# Patient Record
Sex: MALE | Race: WHITE | Employment: FULL TIME | URBAN - METROPOLITAN AREA
[De-identification: names, ages, dates, MRNs, and addresses within clinical notes are randomized per-mention and may not be internally consistent; named-entity substitution may affect disease eponyms.]

---

## 2017-01-02 DIAGNOSIS — E78.00 PURE HYPERCHOLESTEROLEMIA: Chronic | ICD-10-CM

## 2017-01-02 RX ORDER — ATORVASTATIN CALCIUM 80 MG/1
TABLET, FILM COATED ORAL
Qty: 30 TABLET | Refills: 5 | Status: SHIPPED | OUTPATIENT
Start: 2017-01-02 | End: 2017-07-14 | Stop reason: SDUPTHER

## 2017-01-18 ENCOUNTER — OFFICE VISIT (OUTPATIENT)
Dept: FAMILY MEDICINE CLINIC | Age: 72
End: 2017-01-18

## 2017-01-18 VITALS
BODY MASS INDEX: 37.53 KG/M2 | HEIGHT: 69 IN | RESPIRATION RATE: 12 BRPM | SYSTOLIC BLOOD PRESSURE: 144 MMHG | HEART RATE: 76 BPM | DIASTOLIC BLOOD PRESSURE: 66 MMHG | WEIGHT: 253.38 LBS

## 2017-01-18 DIAGNOSIS — E78.00 PURE HYPERCHOLESTEROLEMIA: Chronic | ICD-10-CM

## 2017-01-18 DIAGNOSIS — I25.10 CORONARY ARTERY DISEASE INVOLVING NATIVE CORONARY ARTERY OF NATIVE HEART WITHOUT ANGINA PECTORIS: ICD-10-CM

## 2017-01-18 DIAGNOSIS — J20.9 ACUTE BRONCHITIS, UNSPECIFIED ORGANISM: Primary | ICD-10-CM

## 2017-01-18 PROCEDURE — 99213 OFFICE O/P EST LOW 20 MIN: CPT | Performed by: FAMILY MEDICINE

## 2017-01-18 RX ORDER — IPRATROPIUM BROMIDE 42 UG/1
2 SPRAY, METERED NASAL 3 TIMES DAILY
Qty: 1 BOTTLE | Refills: 3 | Status: SHIPPED | OUTPATIENT
Start: 2017-01-18 | End: 2017-11-21

## 2017-01-18 RX ORDER — AMOXICILLIN AND CLAVULANATE POTASSIUM 875; 125 MG/1; MG/1
1 TABLET, FILM COATED ORAL 2 TIMES DAILY
Qty: 20 TABLET | Refills: 0 | Status: SHIPPED | OUTPATIENT
Start: 2017-01-18 | End: 2017-01-28

## 2017-01-18 ASSESSMENT — ENCOUNTER SYMPTOMS
BACK PAIN: 0
SINUS PRESSURE: 1
SORE THROAT: 1
SHORTNESS OF BREATH: 0
CHEST TIGHTNESS: 0
ABDOMINAL PAIN: 0
BLOOD IN STOOL: 0
TROUBLE SWALLOWING: 0
NAUSEA: 0
COUGH: 1
HOARSE VOICE: 1
EYE PAIN: 0
CONSTIPATION: 0

## 2017-05-02 ENCOUNTER — OFFICE VISIT (OUTPATIENT)
Dept: FAMILY MEDICINE CLINIC | Age: 72
End: 2017-05-02

## 2017-05-02 VITALS
RESPIRATION RATE: 14 BRPM | DIASTOLIC BLOOD PRESSURE: 76 MMHG | HEART RATE: 76 BPM | WEIGHT: 251.5 LBS | SYSTOLIC BLOOD PRESSURE: 134 MMHG | BODY MASS INDEX: 37.25 KG/M2 | HEIGHT: 69 IN

## 2017-05-02 DIAGNOSIS — M54.50 CHRONIC MIDLINE LOW BACK PAIN WITHOUT SCIATICA: Primary | ICD-10-CM

## 2017-05-02 DIAGNOSIS — I25.10 CORONARY ARTERY DISEASE INVOLVING NATIVE CORONARY ARTERY OF NATIVE HEART WITHOUT ANGINA PECTORIS: ICD-10-CM

## 2017-05-02 DIAGNOSIS — E78.00 PURE HYPERCHOLESTEROLEMIA: Chronic | ICD-10-CM

## 2017-05-02 DIAGNOSIS — K21.9 GASTROESOPHAGEAL REFLUX DISEASE WITHOUT ESOPHAGITIS: ICD-10-CM

## 2017-05-02 DIAGNOSIS — G89.29 CHRONIC MIDLINE LOW BACK PAIN WITHOUT SCIATICA: Primary | ICD-10-CM

## 2017-05-02 DIAGNOSIS — N52.9 ERECTILE DYSFUNCTION, UNSPECIFIED ERECTILE DYSFUNCTION TYPE: ICD-10-CM

## 2017-05-02 PROCEDURE — 99213 OFFICE O/P EST LOW 20 MIN: CPT | Performed by: FAMILY MEDICINE

## 2017-05-02 RX ORDER — METOPROLOL SUCCINATE 25 MG/1
12.5 TABLET, EXTENDED RELEASE ORAL DAILY
Qty: 45 TABLET | Refills: 3 | Status: SHIPPED | OUTPATIENT
Start: 2017-05-02 | End: 2018-03-13 | Stop reason: SDUPTHER

## 2017-05-02 ASSESSMENT — ENCOUNTER SYMPTOMS
BLOOD IN STOOL: 0
SHORTNESS OF BREATH: 0
BACK PAIN: 1
COUGH: 0
EYE PAIN: 0
NAUSEA: 0
SORE THROAT: 0
CHEST TIGHTNESS: 0
ABDOMINAL PAIN: 0
CONSTIPATION: 0
TROUBLE SWALLOWING: 0

## 2017-05-02 ASSESSMENT — PATIENT HEALTH QUESTIONNAIRE - PHQ9
2. FEELING DOWN, DEPRESSED OR HOPELESS: 0
1. LITTLE INTEREST OR PLEASURE IN DOING THINGS: 0
SUM OF ALL RESPONSES TO PHQ9 QUESTIONS 1 & 2: 0
1. LITTLE INTEREST OR PLEASURE IN DOING THINGS: 0
SUM OF ALL RESPONSES TO PHQ QUESTIONS 1-9: 0
SUM OF ALL RESPONSES TO PHQ9 QUESTIONS 1 & 2: 0
2. FEELING DOWN, DEPRESSED OR HOPELESS: 0
SUM OF ALL RESPONSES TO PHQ QUESTIONS 1-9: 0

## 2017-06-13 DIAGNOSIS — D68.9 COAGULOPATHY (HCC): ICD-10-CM

## 2017-06-14 RX ORDER — FOLIC ACID 1 MG/1
TABLET ORAL
Qty: 30 TABLET | Refills: 4 | Status: SHIPPED | OUTPATIENT
Start: 2017-06-14 | End: 2017-12-13 | Stop reason: SDUPTHER

## 2017-07-05 ENCOUNTER — TELEPHONE (OUTPATIENT)
Dept: FAMILY MEDICINE CLINIC | Age: 72
End: 2017-07-05

## 2017-07-05 RX ORDER — WARFARIN SODIUM 5 MG/1
TABLET ORAL
Qty: 90 TABLET | Refills: 0 | Status: SHIPPED | OUTPATIENT
Start: 2017-07-05 | End: 2018-09-09 | Stop reason: SDUPTHER

## 2017-07-14 DIAGNOSIS — I25.10 CORONARY ARTERY DISEASE INVOLVING NATIVE CORONARY ARTERY OF NATIVE HEART WITHOUT ANGINA PECTORIS: ICD-10-CM

## 2017-07-14 DIAGNOSIS — E78.00 PURE HYPERCHOLESTEROLEMIA: Chronic | ICD-10-CM

## 2017-07-15 RX ORDER — ATORVASTATIN CALCIUM 80 MG/1
TABLET, FILM COATED ORAL
Qty: 30 TABLET | Refills: 4 | Status: SHIPPED | OUTPATIENT
Start: 2017-07-15 | End: 2017-12-13 | Stop reason: SDUPTHER

## 2017-07-15 RX ORDER — PANTOPRAZOLE SODIUM 40 MG/1
TABLET, DELAYED RELEASE ORAL
Qty: 30 TABLET | Refills: 0 | Status: SHIPPED | OUTPATIENT
Start: 2017-07-15 | End: 2017-07-21 | Stop reason: SDUPTHER

## 2017-07-15 RX ORDER — POTASSIUM CHLORIDE 20 MEQ/1
TABLET, EXTENDED RELEASE ORAL
Qty: 30 TABLET | Refills: 0 | Status: SHIPPED | OUTPATIENT
Start: 2017-07-15 | End: 2017-07-21 | Stop reason: SDUPTHER

## 2017-07-18 DIAGNOSIS — I25.10 CORONARY ARTERY DISEASE INVOLVING NATIVE CORONARY ARTERY OF NATIVE HEART WITHOUT ANGINA PECTORIS: ICD-10-CM

## 2017-07-19 RX ORDER — POTASSIUM CHLORIDE 20 MEQ/1
TABLET, EXTENDED RELEASE ORAL
Qty: 30 TABLET | Refills: 5 | Status: SHIPPED | OUTPATIENT
Start: 2017-07-19 | End: 2017-12-13 | Stop reason: SDUPTHER

## 2017-07-19 RX ORDER — PANTOPRAZOLE SODIUM 40 MG/1
TABLET, DELAYED RELEASE ORAL
Qty: 30 TABLET | Refills: 5 | Status: SHIPPED | OUTPATIENT
Start: 2017-07-19 | End: 2017-07-21 | Stop reason: SDUPTHER

## 2017-07-21 ENCOUNTER — OFFICE VISIT (OUTPATIENT)
Dept: FAMILY MEDICINE CLINIC | Age: 72
End: 2017-07-21

## 2017-07-21 VITALS
WEIGHT: 247.8 LBS | SYSTOLIC BLOOD PRESSURE: 120 MMHG | HEART RATE: 56 BPM | HEIGHT: 69 IN | BODY MASS INDEX: 36.7 KG/M2 | RESPIRATION RATE: 16 BRPM | DIASTOLIC BLOOD PRESSURE: 62 MMHG

## 2017-07-21 DIAGNOSIS — K11.20 SALIVARY GLAND INFECTION: Primary | ICD-10-CM

## 2017-07-21 DIAGNOSIS — K21.9 GASTROESOPHAGEAL REFLUX DISEASE WITHOUT ESOPHAGITIS: ICD-10-CM

## 2017-07-21 DIAGNOSIS — I25.10 CORONARY ARTERY DISEASE INVOLVING NATIVE CORONARY ARTERY OF NATIVE HEART WITHOUT ANGINA PECTORIS: ICD-10-CM

## 2017-07-21 DIAGNOSIS — I82.5Y9 CHRONIC DEEP VEIN THROMBOSIS (DVT) OF PROXIMAL VEIN OF LOWER EXTREMITY, UNSPECIFIED LATERALITY (HCC): ICD-10-CM

## 2017-07-21 PROCEDURE — 99213 OFFICE O/P EST LOW 20 MIN: CPT | Performed by: FAMILY MEDICINE

## 2017-07-21 RX ORDER — PANTOPRAZOLE SODIUM 40 MG/1
TABLET, DELAYED RELEASE ORAL
Qty: 90 TABLET | Refills: 1 | Status: SHIPPED | OUTPATIENT
Start: 2017-07-21 | End: 2018-01-05 | Stop reason: SDUPTHER

## 2017-07-21 RX ORDER — CEPHALEXIN 500 MG/1
500 CAPSULE ORAL 3 TIMES DAILY
Qty: 30 CAPSULE | Refills: 0 | Status: SHIPPED | OUTPATIENT
Start: 2017-07-21 | End: 2017-08-17

## 2017-07-21 ASSESSMENT — ENCOUNTER SYMPTOMS
EYE PAIN: 0
SORE THROAT: 0
BACK PAIN: 0
ABDOMINAL PAIN: 0
NAUSEA: 0
COUGH: 0
BLOOD IN STOOL: 0
TROUBLE SWALLOWING: 0
CHEST TIGHTNESS: 0
CONSTIPATION: 0
SHORTNESS OF BREATH: 0

## 2017-08-03 ENCOUNTER — TELEPHONE (OUTPATIENT)
Dept: FAMILY MEDICINE CLINIC | Age: 72
End: 2017-08-03

## 2017-08-04 ENCOUNTER — TELEPHONE (OUTPATIENT)
Dept: FAMILY MEDICINE CLINIC | Age: 72
End: 2017-08-04

## 2017-08-17 ENCOUNTER — OFFICE VISIT (OUTPATIENT)
Dept: FAMILY MEDICINE CLINIC | Age: 72
End: 2017-08-17

## 2017-08-17 VITALS
WEIGHT: 252.5 LBS | HEIGHT: 69 IN | HEART RATE: 68 BPM | SYSTOLIC BLOOD PRESSURE: 122 MMHG | BODY MASS INDEX: 37.4 KG/M2 | DIASTOLIC BLOOD PRESSURE: 60 MMHG | RESPIRATION RATE: 18 BRPM

## 2017-08-17 DIAGNOSIS — E78.00 PURE HYPERCHOLESTEROLEMIA: Chronic | ICD-10-CM

## 2017-08-17 DIAGNOSIS — I82.5Y9 CHRONIC DEEP VEIN THROMBOSIS (DVT) OF PROXIMAL VEIN OF LOWER EXTREMITY, UNSPECIFIED LATERALITY (HCC): ICD-10-CM

## 2017-08-17 DIAGNOSIS — G89.29 CHRONIC MIDLINE LOW BACK PAIN WITHOUT SCIATICA: ICD-10-CM

## 2017-08-17 DIAGNOSIS — G47.33 OBSTRUCTIVE SLEEP APNEA SYNDROME: ICD-10-CM

## 2017-08-17 DIAGNOSIS — I25.10 CORONARY ARTERY DISEASE INVOLVING NATIVE CORONARY ARTERY OF NATIVE HEART WITHOUT ANGINA PECTORIS: Primary | ICD-10-CM

## 2017-08-17 DIAGNOSIS — K11.20 SALIVARY GLAND INFECTION: ICD-10-CM

## 2017-08-17 DIAGNOSIS — M54.50 CHRONIC MIDLINE LOW BACK PAIN WITHOUT SCIATICA: ICD-10-CM

## 2017-08-17 PROCEDURE — 99213 OFFICE O/P EST LOW 20 MIN: CPT | Performed by: FAMILY MEDICINE

## 2017-08-17 ASSESSMENT — ENCOUNTER SYMPTOMS
HOARSE VOICE: 0
SORE THROAT: 0
NAUSEA: 0
COUGH: 0
SHORTNESS OF BREATH: 0
BACK PAIN: 0
CHEST TIGHTNESS: 0
ABDOMINAL PAIN: 0
EYE PAIN: 0
TROUBLE SWALLOWING: 0
BLOOD IN STOOL: 0
CONSTIPATION: 0

## 2017-11-21 ENCOUNTER — OFFICE VISIT (OUTPATIENT)
Dept: FAMILY MEDICINE CLINIC | Age: 72
End: 2017-11-21

## 2017-11-21 VITALS
BODY MASS INDEX: 37.68 KG/M2 | HEIGHT: 69 IN | HEART RATE: 68 BPM | SYSTOLIC BLOOD PRESSURE: 110 MMHG | WEIGHT: 254.38 LBS | RESPIRATION RATE: 14 BRPM | DIASTOLIC BLOOD PRESSURE: 68 MMHG

## 2017-11-21 DIAGNOSIS — K63.5 POLYP OF COLON, UNSPECIFIED PART OF COLON, UNSPECIFIED TYPE: ICD-10-CM

## 2017-11-21 DIAGNOSIS — Z23 NEED FOR INFLUENZA VACCINATION: ICD-10-CM

## 2017-11-21 DIAGNOSIS — I25.10 CORONARY ARTERY DISEASE INVOLVING NATIVE CORONARY ARTERY OF NATIVE HEART WITHOUT ANGINA PECTORIS: ICD-10-CM

## 2017-11-21 DIAGNOSIS — M54.50 CHRONIC MIDLINE LOW BACK PAIN WITHOUT SCIATICA: ICD-10-CM

## 2017-11-21 DIAGNOSIS — E78.00 PURE HYPERCHOLESTEROLEMIA: Chronic | ICD-10-CM

## 2017-11-21 DIAGNOSIS — K21.9 GASTROESOPHAGEAL REFLUX DISEASE WITHOUT ESOPHAGITIS: Primary | ICD-10-CM

## 2017-11-21 DIAGNOSIS — G89.29 CHRONIC MIDLINE LOW BACK PAIN WITHOUT SCIATICA: ICD-10-CM

## 2017-11-21 PROCEDURE — G0008 ADMIN INFLUENZA VIRUS VAC: HCPCS | Performed by: FAMILY MEDICINE

## 2017-11-21 PROCEDURE — 99213 OFFICE O/P EST LOW 20 MIN: CPT | Performed by: FAMILY MEDICINE

## 2017-11-21 ASSESSMENT — ENCOUNTER SYMPTOMS
ABDOMINAL PAIN: 0
CHEST TIGHTNESS: 0
EYE PAIN: 0
SORE THROAT: 0
NAUSEA: 0
TROUBLE SWALLOWING: 0
BACK PAIN: 0
CONSTIPATION: 0
COUGH: 0
HEARTBURN: 0
SHORTNESS OF BREATH: 0
BLOOD IN STOOL: 0

## 2017-11-21 ASSESSMENT — PATIENT HEALTH QUESTIONNAIRE - PHQ9
1. LITTLE INTEREST OR PLEASURE IN DOING THINGS: 0
SUM OF ALL RESPONSES TO PHQ QUESTIONS 1-9: 0
2. FEELING DOWN, DEPRESSED OR HOPELESS: 0
SUM OF ALL RESPONSES TO PHQ9 QUESTIONS 1 & 2: 0

## 2017-11-21 NOTE — PROGRESS NOTES
Subjective:      Patient ID: Norberto Le is a 67 y.o. male. ashd  Stable       Hx  Of  dvt   And  PE  Noted   And  On  Coumadin       back pain  Better     FABRICE  And  On  cpap        Needs    Colonoscopy      Living  Will    Needs  Done   Ns  Needs  durable  Power  Of  Health       Hyperlipidemia   This is a chronic problem. The current episode started more than 1 year ago. The problem is controlled. Recent lipid tests were reviewed and are normal. Pertinent negatives include no chest pain, focal sensory loss, focal weakness, leg pain or shortness of breath. Current antihyperlipidemic treatment includes statins. The current treatment provides significant improvement of lipids. There are no compliance problems. Gastroesophageal Reflux   He reports no abdominal pain, no chest pain, no coughing, no early satiety, no heartburn, no nausea or no sore throat. This is a chronic problem. The current episode started more than 1 year ago. The problem occurs rarely. The problem has been resolved. Pertinent negatives include no fatigue. He has tried a PPI for the symptoms. The treatment provided significant relief. Past Medical History:   Diagnosis Date    CAD (coronary artery disease) 1/06    cath with stent lad    Chronic back pain 2013      epidural block    Colon polyps 10/05    Diverticulosis of colon     DVT of proximal leg (deep vein thrombosis) ([address] 12/04  2013    left     right  in 2013    GERD (gastroesophageal reflux disease)     Kidney stone on left side 11/09    Pulmonary embolus ([address] 6-2013 and  1-2015     had   right  leg  dvt    Unspecified sleep apnea 2013    cpap       Review of Systems   Constitutional: Negative for fatigue and fever. HENT: Negative for congestion, ear pain, postnasal drip, sore throat and trouble swallowing. Eyes: Negative for pain. Respiratory: Negative for cough, chest tightness and shortness of breath.     Cardiovascular: Negative for chest pain, influenza vaccination  INFLUENZA, QUADV, 6 MO AND OLDER, IM, MDV, 0.5ML (FLULAVAL QUADV)   3. Coronary artery disease involving native coronary artery of native heart without angina pectoris     4. Polyp of colon, unspecified part of colon, unspecified type     5. Chronic midline low back pain without sciatica     6. Pure hypercholesterolemia           Plan:      Current Outpatient Prescriptions   Medication Sig Dispense Refill    pantoprazole (PROTONIX) 40 MG tablet TAKE 1 TABLET BY MOUTH ONE TIME A DAY 90 tablet 1    potassium chloride (KLOR-CON M) 20 MEQ extended release tablet TAKE 1 TABLET BY MOUTH ONE TIME A DAY  30 tablet 5    atorvastatin (LIPITOR) 80 MG tablet TAKE 1 TABLET BY MOUTH ONE TIME A DAY  30 tablet 4    warfarin (COUMADIN) 5 MG tablet Take 5 mg by mouth 90 tablet 0    folic acid (FOLVITE) 1 MG tablet TAKE 1 TABLET BY MOUTH ONE TIME A DAY  30 tablet 4    metoprolol succinate (TOPROL XL) 25 MG extended release tablet Take 0.5 tablets by mouth daily 45 tablet 3    Ascorbic Acid (VITAMIN C) 1000 MG tablet Take 1,000 mg by mouth daily      Multiple Vitamins-Minerals (MULTI COMPLETE PO) Take 1 tablet by mouth daily      Omega-3 Fatty Acids (FISH OIL) 1000 MG CAPS Take 3,000 mg by mouth daily      aspirin 81 MG EC tablet Take 81 mg by mouth daily. No current facility-administered medications for this visit. Orders Placed This Encounter   Procedures    INFLUENZA, QUADV, 6 MO AND OLDER, IM, MDV, 0.5ML ([facility])   No results found for this visit on 11/21/17. Stable and  Labs  Are  wnl         Needs  colonoscopy     ?   Switch        Has  cpap and   FABRICE

## 2017-12-13 DIAGNOSIS — E78.00 PURE HYPERCHOLESTEROLEMIA: Chronic | ICD-10-CM

## 2017-12-13 DIAGNOSIS — D68.9 COAGULOPATHY (HCC): ICD-10-CM

## 2017-12-13 DIAGNOSIS — I25.10 CORONARY ARTERY DISEASE INVOLVING NATIVE CORONARY ARTERY OF NATIVE HEART WITHOUT ANGINA PECTORIS: ICD-10-CM

## 2017-12-13 NOTE — TELEPHONE ENCOUNTER
Date of last visit:  11/21/2017  Date of next visit:  2/21/2018    Requested Prescriptions     Pending Prescriptions Disp Refills    folic acid (FOLVITE) 1 MG tablet [Pharmacy Med Name: Folic Acid Oral Tablet 1 MG] 60 tablet 3     Sig: TAKE 1 TABLET BY MOUTH ONE TIME A DAY    atorvastatin (LIPITOR) 80 MG tablet [Pharmacy Med Name: Atorvastatin Calcium Oral Tablet 80 MG] 30 tablet 3     Sig: TAKE 1 TABLET BY MOUTH ONE TIME A DAY    pantoprazole (PROTONIX) 40 MG tablet [Pharmacy Med Name: Pantoprazole Sodium Oral Tablet Delayed Release 40 MG] 90 tablet 4     Sig: TAKE 1 TABLET BY MOUTH ONE TIME A DAY    potassium chloride (KLOR-CON M) 20 MEQ extended release tablet [Pharmacy Med Name: Potassium Chloride Trinh ER Oral Tablet Extended Release 20 MEQ] 90 tablet 4     Sig: TAKE 1 TABLET BY MOUTH ONE TIME A DAY

## 2017-12-14 RX ORDER — POTASSIUM CHLORIDE 20 MEQ/1
TABLET, EXTENDED RELEASE ORAL
Qty: 90 TABLET | Refills: 4 | Status: SHIPPED | OUTPATIENT
Start: 2017-12-14 | End: 2019-03-08 | Stop reason: SDUPTHER

## 2017-12-14 RX ORDER — FOLIC ACID 1 MG/1
TABLET ORAL
Qty: 60 TABLET | Refills: 3 | Status: SHIPPED | OUTPATIENT
Start: 2017-12-14 | End: 2018-09-18 | Stop reason: SDUPTHER

## 2017-12-14 RX ORDER — PANTOPRAZOLE SODIUM 40 MG/1
TABLET, DELAYED RELEASE ORAL
Qty: 90 TABLET | Refills: 4 | Status: SHIPPED | OUTPATIENT
Start: 2017-12-14 | End: 2019-03-08 | Stop reason: SDUPTHER

## 2017-12-14 RX ORDER — ATORVASTATIN CALCIUM 80 MG/1
TABLET, FILM COATED ORAL
Qty: 30 TABLET | Refills: 3 | Status: SHIPPED | OUTPATIENT
Start: 2017-12-14 | End: 2018-02-27 | Stop reason: SDUPTHER

## 2018-01-05 ENCOUNTER — OFFICE VISIT (OUTPATIENT)
Dept: FAMILY MEDICINE CLINIC | Age: 73
End: 2018-01-05

## 2018-01-05 VITALS
BODY MASS INDEX: 37.51 KG/M2 | RESPIRATION RATE: 14 BRPM | DIASTOLIC BLOOD PRESSURE: 62 MMHG | HEART RATE: 68 BPM | SYSTOLIC BLOOD PRESSURE: 124 MMHG | WEIGHT: 253.25 LBS | HEIGHT: 69 IN

## 2018-01-05 DIAGNOSIS — S80.12XA CONTUSION OF LEFT LOWER LEG, INITIAL ENCOUNTER: Primary | ICD-10-CM

## 2018-01-05 PROCEDURE — 99214 OFFICE O/P EST MOD 30 MIN: CPT | Performed by: FAMILY MEDICINE

## 2018-01-05 ASSESSMENT — ENCOUNTER SYMPTOMS
SHORTNESS OF BREATH: 0
SINUS PRESSURE: 0
CONSTIPATION: 0

## 2018-02-05 ENCOUNTER — OFFICE VISIT (OUTPATIENT)
Dept: FAMILY MEDICINE CLINIC | Age: 73
End: 2018-02-05

## 2018-02-05 VITALS
BODY MASS INDEX: 38.71 KG/M2 | DIASTOLIC BLOOD PRESSURE: 72 MMHG | WEIGHT: 261.38 LBS | RESPIRATION RATE: 14 BRPM | HEIGHT: 69 IN | HEART RATE: 60 BPM | SYSTOLIC BLOOD PRESSURE: 136 MMHG

## 2018-02-05 DIAGNOSIS — M54.50 CHRONIC MIDLINE LOW BACK PAIN WITHOUT SCIATICA: ICD-10-CM

## 2018-02-05 DIAGNOSIS — I25.10 CORONARY ARTERY DISEASE INVOLVING NATIVE CORONARY ARTERY OF NATIVE HEART WITHOUT ANGINA PECTORIS: ICD-10-CM

## 2018-02-05 DIAGNOSIS — M25.50 ARTHRALGIA, UNSPECIFIED JOINT: Primary | ICD-10-CM

## 2018-02-05 DIAGNOSIS — I27.82 OTHER CHRONIC PULMONARY EMBOLISM WITHOUT ACUTE COR PULMONALE (HCC): ICD-10-CM

## 2018-02-05 DIAGNOSIS — K21.9 GASTROESOPHAGEAL REFLUX DISEASE WITHOUT ESOPHAGITIS: ICD-10-CM

## 2018-02-05 DIAGNOSIS — G47.33 OBSTRUCTIVE SLEEP APNEA SYNDROME: ICD-10-CM

## 2018-02-05 DIAGNOSIS — G89.29 CHRONIC MIDLINE LOW BACK PAIN WITHOUT SCIATICA: ICD-10-CM

## 2018-02-05 PROCEDURE — 99213 OFFICE O/P EST LOW 20 MIN: CPT | Performed by: FAMILY MEDICINE

## 2018-02-05 PROCEDURE — 96372 THER/PROPH/DIAG INJ SC/IM: CPT | Performed by: FAMILY MEDICINE

## 2018-02-05 RX ORDER — DEXLANSOPRAZOLE 60 MG/1
60 CAPSULE, DELAYED RELEASE ORAL DAILY
Qty: 15 CAPSULE | Refills: 0 | Status: SHIPPED | OUTPATIENT
Start: 2018-02-05 | End: 2018-02-15

## 2018-02-05 RX ORDER — METHYLPREDNISOLONE ACETATE 40 MG/ML
40 INJECTION, SUSPENSION INTRA-ARTICULAR; INTRALESIONAL; INTRAMUSCULAR; SOFT TISSUE ONCE
Status: COMPLETED | OUTPATIENT
Start: 2018-02-05 | End: 2018-02-05

## 2018-02-05 RX ORDER — METHYLPREDNISOLONE ACETATE 80 MG/ML
80 INJECTION, SUSPENSION INTRA-ARTICULAR; INTRALESIONAL; INTRAMUSCULAR; SOFT TISSUE ONCE
Status: COMPLETED | OUTPATIENT
Start: 2018-02-05 | End: 2018-02-05

## 2018-02-05 RX ORDER — PREDNISONE 20 MG/1
TABLET ORAL
Qty: 21 TABLET | Refills: 0 | Status: SHIPPED | OUTPATIENT
Start: 2018-02-05 | End: 2018-02-15

## 2018-02-05 RX ADMIN — METHYLPREDNISOLONE ACETATE 80 MG: 80 INJECTION, SUSPENSION INTRA-ARTICULAR; INTRALESIONAL; INTRAMUSCULAR; SOFT TISSUE at 15:00

## 2018-02-05 RX ADMIN — METHYLPREDNISOLONE ACETATE 40 MG: 40 INJECTION, SUSPENSION INTRA-ARTICULAR; INTRALESIONAL; INTRAMUSCULAR; SOFT TISSUE at 14:59

## 2018-02-05 ASSESSMENT — ENCOUNTER SYMPTOMS
BACK PAIN: 1
CHEST TIGHTNESS: 0
COUGH: 0
SHORTNESS OF BREATH: 0
CONSTIPATION: 0
SORE THROAT: 0
NAUSEA: 0
EYE PAIN: 0
BLOOD IN STOOL: 0
ABDOMINAL PAIN: 0
TROUBLE SWALLOWING: 0

## 2018-02-05 NOTE — PROGRESS NOTES
Subjective:      Patient ID: Umm Garcia is a 67 y.o. male. Arthralgia  Pain and  Noted  In hands and ankles and   Hips and    Knee pain   Past  10  Days and  Right knee  Worse        Hx  Of  dvt      ashd     Stable            Past Medical History:   Diagnosis Date    CAD (coronary artery disease) 1/06    cath with stent lad    Chronic back pain 2013      epidural block    Colon polyps 10/05    Diverticulosis of colon     DVT of proximal leg (deep vein thrombosis) [address] [address] 12/04 2013    left     right  in 2013    GERD (gastroesophageal reflux disease)     Kidney stone on left side 11/09    Pulmonary embolus ([facility] [address] 6-2013 and  1-2015     had   right  leg  dvt    Unspecified sleep apnea 2013    cpap       Review of Systems   Constitutional: Negative for fatigue and fever. HENT: Negative for congestion, ear pain, postnasal drip, sore throat and trouble swallowing. Eyes: Negative for pain. Respiratory: Negative for cough, chest tightness and shortness of breath. Cardiovascular: Negative for chest pain, palpitations and leg swelling. Gastrointestinal: Negative for abdominal pain, blood in stool, constipation and nausea. Genitourinary: Negative for difficulty urinating, frequency and urgency. Musculoskeletal: Positive for arthralgias and back pain. Negative for joint swelling and neck stiffness. Hip  Pain  and    Back pain   Skin: Negative for rash. Neurological: Negative for dizziness, weakness and headaches. Hematological: Negative for adenopathy. Does not bruise/bleed easily. Psychiatric/Behavioral: Negative for behavioral problems, dysphoric mood and sleep disturbance. /72 (Site: Right Arm, Position: Sitting, Cuff Size: Medium Adult)   Pulse 60   Resp 14   Ht 5' 9\" (1.753 m)   Wt 261 lb 6 oz (118.6 kg)   BMI 38.60 kg/m²   Objective:   Physical Exam   Constitutional: He is oriented to person, place, and time. He appears well-developed and well-nourished.

## 2018-02-06 ENCOUNTER — TELEPHONE (OUTPATIENT)
Dept: FAMILY MEDICINE CLINIC | Age: 73
End: 2018-02-06

## 2018-02-06 LAB
ABSOLUTE BASO #: 0.1 K/UL (ref 0–0.1)
ABSOLUTE EOS #: 0.4 K/UL (ref 0.1–0.4)
ABSOLUTE LYMPH #: 1.7 K/UL (ref 0.8–5.2)
ABSOLUTE MONO #: 0.9 K/UL (ref 0.1–0.9)
ABSOLUTE NEUT #: 3.7 K/UL (ref 1.3–9.1)
ANION GAP SERPL CALCULATED.3IONS-SCNC: 11 MMOL/L
BASOPHILS RELATIVE PERCENT: 0.7 %
BUN BLDV-MCNC: 18 MG/DL (ref 10–25)
C-REACTIVE PROTEIN: 1.38 MG/L
CHLORIDE BLD-SCNC: 108 MMOL/L (ref 95–111)
CO2: 25 MMOL/L (ref 21–32)
CREAT SERPL-MCNC: 0.9 MG/DL (ref 0.7–1.5)
EGFR AFRICAN AMERICAN: 100
EGFR IF NONAFRICAN AMERICAN: 83
EOSINOPHILS RELATIVE PERCENT: 5.4 %
HCT VFR BLD CALC: 41 % (ref 41.4–51)
HEMOGLOBIN: 14.6 G/DL (ref 13.8–17)
LYMPHOCYTE %: 26 %
MCH RBC QN AUTO: 30.7 PG (ref 27–34)
MCHC RBC AUTO-ENTMCNC: 35.6 G/DL (ref 31–36)
MCV RBC AUTO: 86.1 FL (ref 80–100)
MONOCYTES # BLD: 13 %
NEUTROPHILS RELATIVE PERCENT: 54.8 %
PDW BLD-RTO: 12.8 % (ref 10.8–14.8)
PLATELETS: 161 K/UL (ref 150–450)
POTASSIUM SERPL-SCNC: 4 MMOL/L (ref 3.5–5.4)
RBC: 4.76 M/UL (ref 4–5.5)
RHEUMATOID FACTOR: <10 IU/ML
SEDIMENTATION RATE, ERYTHROCYTE: 3 MM/HR (ref 0–15)
SODIUM BLD-SCNC: 140 MMOL/L (ref 134–147)
URIC ACID: 5.5 MG/DL (ref 3.8–7.8)
WBC: 6.7 K/UL (ref 3.7–10.8)

## 2018-02-08 ENCOUNTER — TELEPHONE (OUTPATIENT)
Dept: FAMILY MEDICINE CLINIC | Age: 73
End: 2018-02-08

## 2018-02-08 NOTE — TELEPHONE ENCOUNTER
Patient informed. Stated Prednisone is helping except for his knee but he wants to wait until he finishes the Prednisone.

## 2018-02-15 ENCOUNTER — OFFICE VISIT (OUTPATIENT)
Dept: FAMILY MEDICINE CLINIC | Age: 73
End: 2018-02-15

## 2018-02-15 VITALS
DIASTOLIC BLOOD PRESSURE: 74 MMHG | RESPIRATION RATE: 14 BRPM | SYSTOLIC BLOOD PRESSURE: 128 MMHG | HEIGHT: 69 IN | HEART RATE: 64 BPM | WEIGHT: 250.38 LBS | BODY MASS INDEX: 37.08 KG/M2

## 2018-02-15 DIAGNOSIS — M54.50 CHRONIC MIDLINE LOW BACK PAIN WITHOUT SCIATICA: ICD-10-CM

## 2018-02-15 DIAGNOSIS — G89.29 CHRONIC MIDLINE LOW BACK PAIN WITHOUT SCIATICA: ICD-10-CM

## 2018-02-15 DIAGNOSIS — I82.5Y9 CHRONIC DEEP VEIN THROMBOSIS (DVT) OF PROXIMAL VEIN OF LOWER EXTREMITY, UNSPECIFIED LATERALITY (HCC): ICD-10-CM

## 2018-02-15 DIAGNOSIS — M25.561 ACUTE PAIN OF RIGHT KNEE: Primary | ICD-10-CM

## 2018-02-15 PROCEDURE — 99213 OFFICE O/P EST LOW 20 MIN: CPT | Performed by: FAMILY MEDICINE

## 2018-02-15 RX ORDER — PREDNISONE 20 MG/1
TABLET ORAL
Qty: 15 TABLET | Refills: 0 | Status: SHIPPED | OUTPATIENT
Start: 2018-02-15 | End: 2018-03-14

## 2018-02-15 ASSESSMENT — ENCOUNTER SYMPTOMS
BLOOD IN STOOL: 0
BACK PAIN: 0
CHEST TIGHTNESS: 0
CONSTIPATION: 0
TROUBLE SWALLOWING: 0
COUGH: 0
SORE THROAT: 0
NAUSEA: 0
SHORTNESS OF BREATH: 0
EYE PAIN: 0
ABDOMINAL PAIN: 0

## 2018-02-19 ENCOUNTER — TELEPHONE (OUTPATIENT)
Dept: FAMILY MEDICINE CLINIC | Age: 73
End: 2018-02-19

## 2018-02-20 NOTE — TELEPHONE ENCOUNTER
Jerry Pozo informed by Phone, stated he will call out to [address] [place] [address] and make an appt with a PA.

## 2018-02-27 DIAGNOSIS — E78.00 PURE HYPERCHOLESTEROLEMIA: Chronic | ICD-10-CM

## 2018-02-28 RX ORDER — ATORVASTATIN CALCIUM 80 MG/1
TABLET, FILM COATED ORAL
Qty: 90 TABLET | Refills: 2 | Status: SHIPPED | OUTPATIENT
Start: 2018-02-28 | End: 2018-08-22 | Stop reason: ALTCHOICE

## 2018-03-13 DIAGNOSIS — I25.10 CORONARY ARTERY DISEASE INVOLVING NATIVE CORONARY ARTERY OF NATIVE HEART WITHOUT ANGINA PECTORIS: ICD-10-CM

## 2018-03-14 ENCOUNTER — OFFICE VISIT (OUTPATIENT)
Dept: FAMILY MEDICINE CLINIC | Age: 73
End: 2018-03-14

## 2018-03-14 VITALS
RESPIRATION RATE: 14 BRPM | WEIGHT: 247.13 LBS | HEIGHT: 69 IN | BODY MASS INDEX: 36.6 KG/M2 | HEART RATE: 80 BPM | SYSTOLIC BLOOD PRESSURE: 132 MMHG | DIASTOLIC BLOOD PRESSURE: 72 MMHG

## 2018-03-14 DIAGNOSIS — M25.50 ARTHRALGIA, UNSPECIFIED JOINT: ICD-10-CM

## 2018-03-14 DIAGNOSIS — K21.9 GASTROESOPHAGEAL REFLUX DISEASE WITHOUT ESOPHAGITIS: Primary | ICD-10-CM

## 2018-03-14 DIAGNOSIS — I25.10 CORONARY ARTERY DISEASE INVOLVING NATIVE CORONARY ARTERY OF NATIVE HEART WITHOUT ANGINA PECTORIS: ICD-10-CM

## 2018-03-14 DIAGNOSIS — G89.29 CHRONIC MIDLINE LOW BACK PAIN WITHOUT SCIATICA: ICD-10-CM

## 2018-03-14 DIAGNOSIS — I82.5Y9 CHRONIC DEEP VEIN THROMBOSIS (DVT) OF PROXIMAL VEIN OF LOWER EXTREMITY, UNSPECIFIED LATERALITY (HCC): ICD-10-CM

## 2018-03-14 DIAGNOSIS — M25.561 ACUTE PAIN OF RIGHT KNEE: ICD-10-CM

## 2018-03-14 DIAGNOSIS — I27.82 OTHER CHRONIC PULMONARY EMBOLISM WITHOUT ACUTE COR PULMONALE (HCC): ICD-10-CM

## 2018-03-14 DIAGNOSIS — M54.50 CHRONIC MIDLINE LOW BACK PAIN WITHOUT SCIATICA: ICD-10-CM

## 2018-03-14 DIAGNOSIS — E78.00 PURE HYPERCHOLESTEROLEMIA: Chronic | ICD-10-CM

## 2018-03-14 PROCEDURE — 99213 OFFICE O/P EST LOW 20 MIN: CPT | Performed by: FAMILY MEDICINE

## 2018-03-14 RX ORDER — PREDNISONE 20 MG/1
TABLET ORAL
Qty: 15 TABLET | Refills: 0 | Status: SHIPPED | OUTPATIENT
Start: 2018-03-14 | End: 2018-06-19 | Stop reason: ALTCHOICE

## 2018-03-14 RX ORDER — METOPROLOL SUCCINATE 25 MG/1
TABLET, EXTENDED RELEASE ORAL
Qty: 45 TABLET | Refills: 2 | Status: SHIPPED | OUTPATIENT
Start: 2018-03-14 | End: 2019-03-12 | Stop reason: SDUPTHER

## 2018-03-14 ASSESSMENT — ENCOUNTER SYMPTOMS
SHORTNESS OF BREATH: 0
CHEST TIGHTNESS: 0
CONSTIPATION: 0
COUGH: 0
BLOOD IN STOOL: 0
TROUBLE SWALLOWING: 0
SORE THROAT: 0
NAUSEA: 0
ORTHOPNEA: 0
EYE PAIN: 0
BACK PAIN: 0
ABDOMINAL PAIN: 0

## 2018-03-14 NOTE — PROGRESS NOTES
acute cor pulmonale (HCC)     6. Chronic midline low back pain without sciatica     7. Arthralgia, unspecified joint  predniSONE (DELTASONE) 20 MG tablet   8. Acute pain of right knee  predniSONE (DELTASONE) 20 MG tablet         Plan:      Current Outpatient Prescriptions   Medication Sig Dispense Refill    metoprolol succinate (TOPROL XL) 25 MG extended release tablet TAKE 1/2 TABLET BY MOUTH ONE TIME A DAY  45 tablet 2    predniSONE (DELTASONE) 20 MG tablet One  Tab po  Bid  For  5  Days and then  One  Day  For  5  dAys 15 tablet 0    atorvastatin (LIPITOR) 80 MG tablet TAKE 1 TABLET BY MOUTH ONE TIME A DAY  90 tablet 2    folic acid (FOLVITE) 1 MG tablet TAKE 1 TABLET BY MOUTH ONE TIME A DAY  60 tablet 3    pantoprazole (PROTONIX) 40 MG tablet TAKE 1 TABLET BY MOUTH ONE TIME A DAY  90 tablet 4    potassium chloride (KLOR-CON M) 20 MEQ extended release tablet TAKE 1 TABLET BY MOUTH ONE TIME A DAY  90 tablet 4    warfarin (COUMADIN) 5 MG tablet Take 5 mg by mouth 90 tablet 0    Ascorbic Acid (VITAMIN C) 1000 MG tablet Take 1,000 mg by mouth daily      Multiple Vitamins-Minerals (MULTI COMPLETE PO) Take 1 tablet by mouth daily      Omega-3 Fatty Acids (FISH OIL) 1000 MG CAPS Take 3,000 mg by mouth daily      aspirin 81 MG EC tablet Take 81 mg by mouth daily. No current facility-administered medications for this visit. No orders of the defined types were placed in this encounter. taper  Of  pdn and   Heat and  Therapy. Right  Femur  Stress  Fracture      No results found for this visit on 03/14/18.

## 2018-03-27 ENCOUNTER — HOSPITAL ENCOUNTER (OUTPATIENT)
Dept: PHYSICAL THERAPY | Age: 73
Setting detail: THERAPIES SERIES
Discharge: HOME | End: 2018-03-27
Payer: MEDICARE

## 2018-03-27 PROCEDURE — G8979 MOBILITY GOAL STATUS: HCPCS

## 2018-03-27 PROCEDURE — G8978 MOBILITY CURRENT STATUS: HCPCS

## 2018-03-27 PROCEDURE — 97161 PT EVAL LOW COMPLEX 20 MIN: CPT

## 2018-03-27 ASSESSMENT — PAIN SCALES - GENERAL: PAINLEVEL_OUTOF10: 2

## 2018-03-27 ASSESSMENT — PAIN DESCRIPTION - DESCRIPTORS: DESCRIPTORS: ACHING

## 2018-03-27 ASSESSMENT — PAIN DESCRIPTION - ORIENTATION: ORIENTATION: RIGHT

## 2018-03-27 ASSESSMENT — PAIN DESCRIPTION - PAIN TYPE: TYPE: CHRONIC PAIN

## 2018-03-27 ASSESSMENT — PAIN DESCRIPTION - PROGRESSION: CLINICAL_PROGRESSION: GRADUALLY IMPROVING

## 2018-03-27 ASSESSMENT — PAIN DESCRIPTION - FREQUENCY: FREQUENCY: INTERMITTENT

## 2018-03-27 ASSESSMENT — PAIN DESCRIPTION - LOCATION: LOCATION: KNEE

## 2018-03-27 NOTE — PROGRESS NOTES
related to allergies and medications. Subjective:  Chart Reviewed: Yes  Comments: Follow up with Dr. Chelo Mcdaniel end of April 2018. Other (Comment): PT for evaluation and treatment, ROM PROM, AAROM, AROM, strength PRE, functional training, US, HP, Massage, cryotherapy, Estim, modalties as needed. Subjective: Patient reports that 4 days prior to vacation in February.  had difficulty walking in AM. MRI completed. Noted hairline fracture of right femur. Patient also has arthritis of right knee. Patient reports that right femur and hip area have been better in last week with improved walking, less hip pain. He has been putting a cold pack on right knee and using [employer] bicycle. Patient notes most pain on inside of top of right knee Patient very seldom has right hip pain. Notes that he has pain sometimes when he rolls onto hip in bed. In last 5 days he is walking straighter and less pain. Patient no longer having to use a walker. Pain:  Patient Currently in Pain: Yes  Pain Assessment: 0-10  Pain Level: 2  Pain Type: Chronic pain  Pain Location: Knee  Pain Orientation: Right  Pain Radiating Towards: right knee medial region and anterior thigh  Pain Descriptors: Aching  Pain Frequency: Intermittent  Clinical Progression: Gradually improving     Social/Functional History:    Lives With: Spouse  Type of Home: House  Home Layout: One level  Home Access: Level entry     Bathroom Shower/Tub: Tub/Shower unit  Bathroom Toilet: Handicap height  Bathroom Accessibility: Accessible       ADL Assistance: Independent  Homemaking Assistance: Independent  Ambulation Assistance: Independent  Transfer Assistance: Independent    Active : Yes  Mode of Transportation: Car  Occupation: Full time employment  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    Leisure & Hobbies: likes to golf,     Objective                            Observation/Palpation  Palpation: Note tenderness at right medial femoral condyle region, and medial joint line,   Observation: Note decreased wt shift through right LE with ambulation with mild antalgic gait pattern see ambulation section. ROM RLE: right knee 5 degrees to 130 degrees flexion, SKTC hip flexion 95 degrees, SLR 80 degrees. ROM LLE: left knee 5 degrees to 130 degrees flexion, SKTC hip flexion 103 degrees, SLR 75-80 degrees    Comment: Strength right hip flexors 4/5, hip abduction 4/5, hip extension 5/5, knee 5/5     Comment: Strength Left hip flexors 4/5, hip abduction 4/5, hip extension 5/5 knee 5/5                            Ambulation 1  Surface: carpet  Device: No Device  Assistance: Independent  Quality of Gait: moderate antalgic gait with decreased wt shift RLE,   Distance: 40 feet x2,       Stairs  # Steps : 4  Rails: Right ascending  Device: No Device  Assistance: Modified independent   Comment: Note difficulty with descending step with decreased wt shift through RLE. Balance  Tandem Stance R Le  Tandem Stance L Le  Single Leg Stance R Le  Single Leg Stance L Leg: 15                                      Activity Tolerance:  Activity Tolerance: Patient limited by pain    Assessment: Body structures, Functions, Activity limitations: Decreased functional mobility , Decreased ROM, Decreased strength, Decreased balance  Assessment: 75year old male with history of stress fracture to right femur and right knee arthritis. Patient is progressively getting better with less pain and less difficulty walking. Note in evaluation mild decreased strength at hip musculature, decreased balance in tandem stance SLS not assessed. Will follow 1-2x per week for up to 6-8 weeks. Patient Education: Patient educated in plan of care.                        Plan:  Times per week: 1-2x   Plan weeks: 8 weeks  Specific instructions for Next Treatment: Nustep or Sports Art Bike, strengthening program for RLE, balance

## 2018-04-04 ENCOUNTER — HOSPITAL ENCOUNTER (OUTPATIENT)
Dept: PHYSICAL THERAPY | Age: 73
Setting detail: THERAPIES SERIES
Discharge: HOME | End: 2018-04-04
Payer: MEDICARE

## 2018-04-04 PROCEDURE — 97110 THERAPEUTIC EXERCISES: CPT

## 2018-04-04 ASSESSMENT — PAIN SCALES - GENERAL: PAINLEVEL_OUTOF10: 0

## 2018-04-04 ASSESSMENT — PAIN DESCRIPTION - ORIENTATION: ORIENTATION: RIGHT

## 2018-04-04 ASSESSMENT — PAIN DESCRIPTION - PAIN TYPE: TYPE: CHRONIC PAIN

## 2018-04-04 ASSESSMENT — PAIN DESCRIPTION - LOCATION: LOCATION: HIP;KNEE

## 2018-04-10 ENCOUNTER — HOSPITAL ENCOUNTER (OUTPATIENT)
Dept: PHYSICAL THERAPY | Age: 73
Setting detail: THERAPIES SERIES
Discharge: HOME | End: 2018-04-10
Payer: MEDICARE

## 2018-04-10 PROCEDURE — 97110 THERAPEUTIC EXERCISES: CPT

## 2018-04-10 ASSESSMENT — PAIN SCALES - GENERAL: PAINLEVEL_OUTOF10: 3

## 2018-04-12 ENCOUNTER — HOSPITAL ENCOUNTER (OUTPATIENT)
Dept: PHYSICAL THERAPY | Age: 73
Setting detail: THERAPIES SERIES
Discharge: HOME | End: 2018-04-12
Payer: MEDICARE

## 2018-04-12 PROCEDURE — 97110 THERAPEUTIC EXERCISES: CPT

## 2018-04-13 DIAGNOSIS — E78.00 PURE HYPERCHOLESTEROLEMIA: Chronic | ICD-10-CM

## 2018-04-14 RX ORDER — ATORVASTATIN CALCIUM 80 MG/1
TABLET, FILM COATED ORAL
Qty: 90 TABLET | Refills: 2 | Status: SHIPPED | OUTPATIENT
Start: 2018-04-14 | End: 2018-06-19 | Stop reason: SDUPTHER

## 2018-04-17 ENCOUNTER — HOSPITAL ENCOUNTER (OUTPATIENT)
Dept: PHYSICAL THERAPY | Age: 73
Setting detail: THERAPIES SERIES
Discharge: HOME | End: 2018-04-17
Payer: MEDICARE

## 2018-04-17 PROCEDURE — 97110 THERAPEUTIC EXERCISES: CPT

## 2018-04-20 ENCOUNTER — HOSPITAL ENCOUNTER (OUTPATIENT)
Dept: PHYSICAL THERAPY | Age: 73
Setting detail: THERAPIES SERIES
Discharge: HOME | End: 2018-04-20
Payer: MEDICARE

## 2018-04-20 PROCEDURE — 97110 THERAPEUTIC EXERCISES: CPT

## 2018-04-20 ASSESSMENT — PAIN DESCRIPTION - ORIENTATION: ORIENTATION: RIGHT

## 2018-04-20 ASSESSMENT — PAIN DESCRIPTION - LOCATION: LOCATION: HIP;KNEE

## 2018-04-20 ASSESSMENT — PAIN SCALES - GENERAL: PAINLEVEL_OUTOF10: 0

## 2018-04-20 ASSESSMENT — PAIN DESCRIPTION - PAIN TYPE: TYPE: CHRONIC PAIN

## 2018-04-23 PROCEDURE — G8979 MOBILITY GOAL STATUS: HCPCS

## 2018-04-23 PROCEDURE — G8980 MOBILITY D/C STATUS: HCPCS

## 2018-06-19 ENCOUNTER — OFFICE VISIT (OUTPATIENT)
Dept: FAMILY MEDICINE CLINIC | Age: 73
End: 2018-06-19

## 2018-06-19 VITALS
DIASTOLIC BLOOD PRESSURE: 76 MMHG | HEART RATE: 60 BPM | RESPIRATION RATE: 14 BRPM | WEIGHT: 253.38 LBS | HEIGHT: 69 IN | BODY MASS INDEX: 37.53 KG/M2 | SYSTOLIC BLOOD PRESSURE: 134 MMHG

## 2018-06-19 DIAGNOSIS — L30.9 DERMATITIS: ICD-10-CM

## 2018-06-19 DIAGNOSIS — K21.9 GASTROESOPHAGEAL REFLUX DISEASE WITHOUT ESOPHAGITIS: ICD-10-CM

## 2018-06-19 DIAGNOSIS — I25.10 CORONARY ARTERY DISEASE INVOLVING NATIVE CORONARY ARTERY OF NATIVE HEART WITHOUT ANGINA PECTORIS: Primary | ICD-10-CM

## 2018-06-19 DIAGNOSIS — G47.33 OBSTRUCTIVE SLEEP APNEA SYNDROME: ICD-10-CM

## 2018-06-19 DIAGNOSIS — E78.00 PURE HYPERCHOLESTEROLEMIA: Chronic | ICD-10-CM

## 2018-06-19 DIAGNOSIS — L30.9 ECZEMA, UNSPECIFIED TYPE: ICD-10-CM

## 2018-06-19 DIAGNOSIS — I82.5Y9 CHRONIC DEEP VEIN THROMBOSIS (DVT) OF PROXIMAL VEIN OF LOWER EXTREMITY, UNSPECIFIED LATERALITY (HCC): ICD-10-CM

## 2018-06-19 PROCEDURE — 99213 OFFICE O/P EST LOW 20 MIN: CPT | Performed by: FAMILY MEDICINE

## 2018-06-19 RX ORDER — CLOTRIMAZOLE AND BETAMETHASONE DIPROPIONATE 10; .64 MG/G; MG/G
CREAM TOPICAL
Qty: 45 G | Refills: 3 | Status: SHIPPED | OUTPATIENT
Start: 2018-06-19 | End: 2020-10-27 | Stop reason: SDUPTHER

## 2018-06-19 ASSESSMENT — ENCOUNTER SYMPTOMS
SHORTNESS OF BREATH: 0
COUGH: 0
CHEST TIGHTNESS: 0
HEARTBURN: 0
BACK PAIN: 0
BLOOD IN STOOL: 0
NAUSEA: 0
TROUBLE SWALLOWING: 0
SORE THROAT: 0
CONSTIPATION: 0
ABDOMINAL PAIN: 0
EYE PAIN: 0

## 2018-08-22 ENCOUNTER — OFFICE VISIT (OUTPATIENT)
Dept: FAMILY MEDICINE CLINIC | Age: 73
End: 2018-08-22

## 2018-08-22 VITALS
HEIGHT: 69 IN | BODY MASS INDEX: 37.53 KG/M2 | HEART RATE: 60 BPM | RESPIRATION RATE: 14 BRPM | SYSTOLIC BLOOD PRESSURE: 126 MMHG | WEIGHT: 253.38 LBS | DIASTOLIC BLOOD PRESSURE: 74 MMHG

## 2018-08-22 DIAGNOSIS — I25.10 CORONARY ARTERY DISEASE INVOLVING NATIVE CORONARY ARTERY OF NATIVE HEART WITHOUT ANGINA PECTORIS: ICD-10-CM

## 2018-08-22 DIAGNOSIS — K21.9 GASTROESOPHAGEAL REFLUX DISEASE WITHOUT ESOPHAGITIS: ICD-10-CM

## 2018-08-22 DIAGNOSIS — M35.3 POLYMYALGIA (HCC): Primary | ICD-10-CM

## 2018-08-22 PROCEDURE — 99213 OFFICE O/P EST LOW 20 MIN: CPT | Performed by: FAMILY MEDICINE

## 2018-08-22 RX ORDER — DEXLANSOPRAZOLE 60 MG/1
60 CAPSULE, DELAYED RELEASE ORAL DAILY
Qty: 10 CAPSULE | Refills: 0 | Status: SHIPPED | OUTPATIENT
Start: 2018-08-22 | End: 2018-09-18 | Stop reason: ALTCHOICE

## 2018-08-22 RX ORDER — PREDNISONE 20 MG/1
TABLET ORAL
Qty: 40 TABLET | Refills: 0 | Status: SHIPPED | OUTPATIENT
Start: 2018-08-22 | End: 2018-11-13 | Stop reason: ALTCHOICE

## 2018-08-22 ASSESSMENT — ENCOUNTER SYMPTOMS
NAUSEA: 0
TROUBLE SWALLOWING: 0
EYE PAIN: 0
BLOOD IN STOOL: 0
SORE THROAT: 0
SHORTNESS OF BREATH: 0
COUGH: 0
CONSTIPATION: 0
BACK PAIN: 0
CHEST TIGHTNESS: 0
ABDOMINAL PAIN: 0

## 2018-08-23 LAB
ABSOLUTE BASO #: 0.1 K/UL (ref 0–0.1)
ABSOLUTE EOS #: 0.2 K/UL (ref 0.1–0.4)
ABSOLUTE LYMPH #: 1.6 K/UL (ref 0.8–5.2)
ABSOLUTE MONO #: 1.1 K/UL (ref 0.1–0.9)
ABSOLUTE NEUT #: 5.1 K/UL (ref 1.3–9.1)
ANION GAP SERPL CALCULATED.3IONS-SCNC: 12 MEQ/L (ref 10–19)
ANTI-NUCLEAR ANTIBODY (ANA): NORMAL
BASOPHILS RELATIVE PERCENT: 0.7 %
BUN BLDV-MCNC: 15 MG/DL (ref 8–23)
C-REACTIVE PROTEIN: <0.13 MG/DL
CALCIUM SERPL-MCNC: 9.7 MG/DL (ref 8.5–10.5)
CHLORIDE BLD-SCNC: 106 MEQ/L (ref 95–107)
CO2: 25 MEQ/L (ref 19–31)
CREAT SERPL-MCNC: 0.8 MG/DL (ref 0.8–1.4)
EGFR AFRICAN AMERICAN: 102 ML/MIN/1.73 M2
EGFR IF NONAFRICAN AMERICAN: 88 ML/MIN/1.73 M2
EOSINOPHILS RELATIVE PERCENT: 3 %
GLUCOSE: 103 MG/DL (ref 70–99)
HCT VFR BLD CALC: 43.8 % (ref 41.4–51)
HEMOGLOBIN: 15.4 G/DL (ref 13.8–17)
LYMPHOCYTE %: 19.3 %
MCH RBC QN AUTO: 30.2 PG (ref 27–34)
MCHC RBC AUTO-ENTMCNC: 35.2 G/DL (ref 31–36)
MCV RBC AUTO: 85.9 FL (ref 80–100)
MONOCYTES # BLD: 13.4 %
NEUTROPHILS RELATIVE PERCENT: 63.2 %
PDW BLD-RTO: 13.1 % (ref 10.8–14.8)
PLATELETS: 176 K/UL (ref 150–450)
POTASSIUM SERPL-SCNC: 4.3 MEQ/L (ref 3.5–5.4)
RBC: 5.1 M/UL (ref 4–5.5)
RHEUMATOID FACTOR: <10 IU/ML
SEDIMENTATION RATE, ERYTHROCYTE: 8 MM/HR (ref 0–15)
SODIUM BLD-SCNC: 143 MEQ/L (ref 135–146)
URIC ACID: 4.9 MG/DL (ref 3.7–8)
WBC: 8 K/UL (ref 3.7–10.8)

## 2018-08-24 ENCOUNTER — TELEPHONE (OUTPATIENT)
Dept: FAMILY MEDICINE CLINIC | Age: 73
End: 2018-08-24

## 2018-09-10 NOTE — TELEPHONE ENCOUNTER
The pharmacy is  requesting a refill of the below medication which has been pended for you:     Requested Prescriptions     Pending Prescriptions Disp Refills    JANTOVEN 5 MG tablet [Pharmacy Med Name: Shreya Kingsley Oral Tablet 5 MG] 120 tablet 2     Sig: take as directed by coumadin clinic 120 tabs=90 days       Last Appointment Date: 8/22/2018  Next Appointment Date: 9/18/2018    No Known Allergies

## 2018-09-11 RX ORDER — WARFARIN SODIUM 5 MG/1
TABLET ORAL
Qty: 120 TABLET | Refills: 2 | Status: SHIPPED | OUTPATIENT
Start: 2018-09-11 | End: 2019-06-06 | Stop reason: SDUPTHER

## 2018-09-13 DIAGNOSIS — D68.9 COAGULOPATHY (HCC): ICD-10-CM

## 2018-09-13 NOTE — TELEPHONE ENCOUNTER
Date of last visit:  8/22/2018  Date of next visit:  9/18/2018    Requested Prescriptions     Pending Prescriptions Disp Refills    folic acid (FOLVITE) 1 MG tablet [Pharmacy Med Name: Folic Acid Oral Tablet 1 MG] 60 tablet 3     Sig: TAKE 1 TABLET BY MOUTH ONE TIME A DAY

## 2018-09-14 RX ORDER — FOLIC ACID 1 MG/1
TABLET ORAL
Qty: 60 TABLET | Refills: 3 | Status: SHIPPED | OUTPATIENT
Start: 2018-09-14 | End: 2019-05-26 | Stop reason: SDUPTHER

## 2018-09-18 ENCOUNTER — OFFICE VISIT (OUTPATIENT)
Dept: FAMILY MEDICINE CLINIC | Age: 73
End: 2018-09-18

## 2018-09-18 VITALS
BODY MASS INDEX: 37.2 KG/M2 | DIASTOLIC BLOOD PRESSURE: 66 MMHG | SYSTOLIC BLOOD PRESSURE: 124 MMHG | HEIGHT: 69 IN | WEIGHT: 251.13 LBS | HEART RATE: 72 BPM | RESPIRATION RATE: 14 BRPM

## 2018-09-18 DIAGNOSIS — I82.5Y9 CHRONIC DEEP VEIN THROMBOSIS (DVT) OF PROXIMAL VEIN OF LOWER EXTREMITY, UNSPECIFIED LATERALITY (HCC): ICD-10-CM

## 2018-09-18 DIAGNOSIS — M25.551 RIGHT HIP PAIN: ICD-10-CM

## 2018-09-18 DIAGNOSIS — G89.29 CHRONIC MIDLINE LOW BACK PAIN WITHOUT SCIATICA: ICD-10-CM

## 2018-09-18 DIAGNOSIS — I25.10 CORONARY ARTERY DISEASE INVOLVING NATIVE CORONARY ARTERY OF NATIVE HEART WITHOUT ANGINA PECTORIS: ICD-10-CM

## 2018-09-18 DIAGNOSIS — M54.50 CHRONIC MIDLINE LOW BACK PAIN WITHOUT SCIATICA: ICD-10-CM

## 2018-09-18 DIAGNOSIS — M19.90 ARTHRITIS: Primary | ICD-10-CM

## 2018-09-18 PROCEDURE — 99213 OFFICE O/P EST LOW 20 MIN: CPT | Performed by: FAMILY MEDICINE

## 2018-09-18 RX ORDER — ATORVASTATIN CALCIUM 80 MG/1
TABLET, FILM COATED ORAL
Refills: 2 | COMMUNITY
Start: 2018-09-13 | End: 2019-08-15

## 2018-09-18 RX ORDER — PREDNISONE 1 MG/1
TABLET ORAL
Qty: 11 TABLET | Refills: 0 | Status: SHIPPED | OUTPATIENT
Start: 2018-09-18 | End: 2018-11-13 | Stop reason: ALTCHOICE

## 2018-09-18 ASSESSMENT — PATIENT HEALTH QUESTIONNAIRE - PHQ9
SUM OF ALL RESPONSES TO PHQ QUESTIONS 1-9: 1
SUM OF ALL RESPONSES TO PHQ QUESTIONS 1-9: 1
2. FEELING DOWN, DEPRESSED OR HOPELESS: 1
SUM OF ALL RESPONSES TO PHQ9 QUESTIONS 1 & 2: 1
1. LITTLE INTEREST OR PLEASURE IN DOING THINGS: 0

## 2018-09-18 ASSESSMENT — ENCOUNTER SYMPTOMS
BACK PAIN: 0
TROUBLE SWALLOWING: 0
SHORTNESS OF BREATH: 0
COUGH: 0
ABDOMINAL PAIN: 0
CONSTIPATION: 0
EYE PAIN: 0
BLOOD IN STOOL: 0
CHEST TIGHTNESS: 0
SORE THROAT: 0
NAUSEA: 0

## 2018-10-17 ENCOUNTER — TELEPHONE (OUTPATIENT)
Dept: FAMILY MEDICINE CLINIC | Age: 73
End: 2018-10-17

## 2018-11-13 ENCOUNTER — OFFICE VISIT (OUTPATIENT)
Dept: FAMILY MEDICINE CLINIC | Age: 73
End: 2018-11-13

## 2018-11-13 VITALS
BODY MASS INDEX: 38.97 KG/M2 | DIASTOLIC BLOOD PRESSURE: 76 MMHG | SYSTOLIC BLOOD PRESSURE: 130 MMHG | RESPIRATION RATE: 14 BRPM | HEART RATE: 60 BPM | HEIGHT: 69 IN | WEIGHT: 263.13 LBS

## 2018-11-13 DIAGNOSIS — R06.09 DYSPNEA ON EXERTION: Primary | ICD-10-CM

## 2018-11-13 DIAGNOSIS — M25.551 RIGHT HIP PAIN: ICD-10-CM

## 2018-11-13 DIAGNOSIS — I25.10 CORONARY ARTERY DISEASE INVOLVING NATIVE CORONARY ARTERY OF NATIVE HEART WITHOUT ANGINA PECTORIS: ICD-10-CM

## 2018-11-13 DIAGNOSIS — M54.50 CHRONIC MIDLINE LOW BACK PAIN WITHOUT SCIATICA: ICD-10-CM

## 2018-11-13 DIAGNOSIS — G89.29 CHRONIC MIDLINE LOW BACK PAIN WITHOUT SCIATICA: ICD-10-CM

## 2018-11-13 PROCEDURE — 93000 ELECTROCARDIOGRAM COMPLETE: CPT | Performed by: FAMILY MEDICINE

## 2018-11-13 PROCEDURE — 99213 OFFICE O/P EST LOW 20 MIN: CPT | Performed by: FAMILY MEDICINE

## 2018-11-13 ASSESSMENT — ENCOUNTER SYMPTOMS
EYE PAIN: 0
SHORTNESS OF BREATH: 1
ABDOMINAL PAIN: 0
NAUSEA: 0
BLOOD IN STOOL: 0
SORE THROAT: 0
COUGH: 0
CHEST TIGHTNESS: 0
BACK PAIN: 0
CONSTIPATION: 0
TROUBLE SWALLOWING: 0

## 2018-11-15 ENCOUNTER — HOSPITAL ENCOUNTER (OUTPATIENT)
Age: 73
Discharge: HOME | End: 2018-11-15
Payer: MEDICARE

## 2018-11-15 ENCOUNTER — HOSPITAL ENCOUNTER (OUTPATIENT)
Dept: GENERAL RADIOLOGY | Age: 73
Discharge: HOME | End: 2018-11-15
Payer: MEDICARE

## 2018-11-15 DIAGNOSIS — M25.551 RIGHT HIP PAIN: ICD-10-CM

## 2018-11-15 DIAGNOSIS — R06.09 DYSPNEA ON EXERTION: ICD-10-CM

## 2018-11-15 LAB
ANION GAP SERPL CALCULATED.3IONS-SCNC: 13 MEQ/L (ref 8–16)
BASOPHILS # BLD: 0.8 %
BASOPHILS ABSOLUTE: 0.1 THOU/MM3 (ref 0–0.1)
BUN BLDV-MCNC: 11 MG/DL (ref 7–22)
CALCIUM SERPL-MCNC: 9.7 MG/DL (ref 8.5–10.5)
CHLORIDE BLD-SCNC: 105 MEQ/L (ref 98–111)
CO2: 23 MEQ/L (ref 23–33)
CREAT SERPL-MCNC: 0.8 MG/DL (ref 0.4–1.2)
EOSINOPHIL # BLD: 2.6 %
EOSINOPHILS ABSOLUTE: 0.2 THOU/MM3 (ref 0–0.4)
ERYTHROCYTE [DISTWIDTH] IN BLOOD BY AUTOMATED COUNT: 13.5 % (ref 11.5–14.5)
ERYTHROCYTE [DISTWIDTH] IN BLOOD BY AUTOMATED COUNT: 46.1 FL (ref 35–45)
GFR SERPL CREATININE-BSD FRML MDRD: > 90 ML/MIN/1.73M2
GLUCOSE BLD-MCNC: 102 MG/DL (ref 70–108)
HCT VFR BLD CALC: 43.9 % (ref 42–52)
HEMOGLOBIN: 14.6 GM/DL (ref 14–18)
IMMATURE GRANS (ABS): 0.01 THOU/MM3 (ref 0–0.07)
IMMATURE GRANULOCYTES: 0.2 %
LYMPHOCYTES # BLD: 20.8 %
LYMPHOCYTES ABSOLUTE: 1.4 THOU/MM3 (ref 1–4.8)
MCH RBC QN AUTO: 30.7 PG (ref 26–33)
MCHC RBC AUTO-ENTMCNC: 33.3 GM/DL (ref 32.2–35.5)
MCV RBC AUTO: 92.2 FL (ref 80–94)
MONOCYTES # BLD: 16.8 %
MONOCYTES ABSOLUTE: 1.1 THOU/MM3 (ref 0.4–1.3)
NUCLEATED RED BLOOD CELLS: 0 /100 WBC
PLATELET # BLD: 187 THOU/MM3 (ref 130–400)
PMV BLD AUTO: 9.9 FL (ref 9.4–12.4)
POTASSIUM SERPL-SCNC: 4.1 MEQ/L (ref 3.5–5.2)
PRO-BNP: 31.9 PG/ML (ref 0–900)
RBC # BLD: 4.76 MILL/MM3 (ref 4.7–6.1)
SEG NEUTROPHILS: 58.8 %
SEGMENTED NEUTROPHILS ABSOLUTE COUNT: 3.8 THOU/MM3 (ref 1.8–7.7)
SODIUM BLD-SCNC: 141 MEQ/L (ref 135–145)
WBC # BLD: 6.5 THOU/MM3 (ref 4.8–10.8)

## 2018-11-15 PROCEDURE — 36415 COLL VENOUS BLD VENIPUNCTURE: CPT

## 2018-11-15 PROCEDURE — 80048 BASIC METABOLIC PNL TOTAL CA: CPT

## 2018-11-15 PROCEDURE — 85025 COMPLETE CBC W/AUTO DIFF WBC: CPT

## 2018-11-15 PROCEDURE — 73502 X-RAY EXAM HIP UNI 2-3 VIEWS: CPT

## 2018-11-15 PROCEDURE — 83880 ASSAY OF NATRIURETIC PEPTIDE: CPT

## 2018-11-15 PROCEDURE — 71046 X-RAY EXAM CHEST 2 VIEWS: CPT

## 2018-11-16 ENCOUNTER — TELEPHONE (OUTPATIENT)
Dept: FAMILY MEDICINE CLINIC | Age: 73
End: 2018-11-16

## 2018-11-16 DIAGNOSIS — M16.11 PRIMARY OSTEOARTHRITIS OF RIGHT HIP: Primary | ICD-10-CM

## 2018-12-19 ENCOUNTER — OFFICE VISIT (OUTPATIENT)
Dept: CARDIOLOGY CLINIC | Age: 73
End: 2018-12-19
Payer: MEDICARE

## 2018-12-19 VITALS
HEART RATE: 60 BPM | BODY MASS INDEX: 36.48 KG/M2 | DIASTOLIC BLOOD PRESSURE: 78 MMHG | SYSTOLIC BLOOD PRESSURE: 122 MMHG | HEIGHT: 71 IN | WEIGHT: 260.6 LBS

## 2018-12-19 DIAGNOSIS — I50.32 CHRONIC DIASTOLIC CONGESTIVE HEART FAILURE (HCC): ICD-10-CM

## 2018-12-19 DIAGNOSIS — I25.10 CORONARY ARTERY DISEASE INVOLVING NATIVE CORONARY ARTERY OF NATIVE HEART WITHOUT ANGINA PECTORIS: Primary | ICD-10-CM

## 2018-12-19 DIAGNOSIS — R60.0 BILATERAL LEG EDEMA: ICD-10-CM

## 2018-12-19 DIAGNOSIS — E78.00 PURE HYPERCHOLESTEROLEMIA: Chronic | ICD-10-CM

## 2018-12-19 PROCEDURE — 99204 OFFICE O/P NEW MOD 45 MIN: CPT | Performed by: INTERNAL MEDICINE

## 2018-12-19 RX ORDER — SPIRONOLACTONE AND HYDROCHLOROTHIAZIDE 25; 25 MG/1; MG/1
0.5 TABLET ORAL DAILY
Qty: 30 TABLET | Refills: 3 | Status: SHIPPED | OUTPATIENT
Start: 2018-12-19 | End: 2019-04-08

## 2018-12-27 ENCOUNTER — OFFICE VISIT (OUTPATIENT)
Dept: FAMILY MEDICINE CLINIC | Age: 73
End: 2018-12-27

## 2018-12-27 VITALS
HEART RATE: 74 BPM | WEIGHT: 258.13 LBS | RESPIRATION RATE: 13 BRPM | HEIGHT: 71 IN | BODY MASS INDEX: 36.14 KG/M2 | SYSTOLIC BLOOD PRESSURE: 132 MMHG | DIASTOLIC BLOOD PRESSURE: 60 MMHG

## 2018-12-27 DIAGNOSIS — I25.10 CORONARY ARTERY DISEASE INVOLVING NATIVE CORONARY ARTERY OF NATIVE HEART WITHOUT ANGINA PECTORIS: ICD-10-CM

## 2018-12-27 DIAGNOSIS — I50.32 CHRONIC DIASTOLIC CONGESTIVE HEART FAILURE (HCC): ICD-10-CM

## 2018-12-27 DIAGNOSIS — Z00.00 ROUTINE GENERAL MEDICAL EXAMINATION AT A HEALTH CARE FACILITY: Primary | ICD-10-CM

## 2018-12-27 PROCEDURE — G0439 PPPS, SUBSEQ VISIT: HCPCS | Performed by: FAMILY MEDICINE

## 2018-12-27 ASSESSMENT — PATIENT HEALTH QUESTIONNAIRE - PHQ9
SUM OF ALL RESPONSES TO PHQ QUESTIONS 1-9: 0
SUM OF ALL RESPONSES TO PHQ QUESTIONS 1-9: 0
2. FEELING DOWN, DEPRESSED OR HOPELESS: 0
SUM OF ALL RESPONSES TO PHQ QUESTIONS 1-9: 0
SUM OF ALL RESPONSES TO PHQ QUESTIONS 1-9: 0

## 2018-12-27 ASSESSMENT — LIFESTYLE VARIABLES
HAVE YOU OR SOMEONE ELSE BEEN INJURED AS A RESULT OF YOUR DRINKING: 0
HOW OFTEN DURING THE LAST YEAR HAVE YOU BEEN UNABLE TO REMEMBER WHAT HAPPENED THE NIGHT BEFORE BECAUSE YOU HAD BEEN DRINKING: 0
AUDIT-C TOTAL SCORE: 2
HAS A RELATIVE, FRIEND, DOCTOR, OR ANOTHER HEALTH PROFESSIONAL EXPRESSED CONCERN ABOUT YOUR DRINKING OR SUGGESTED YOU CUT DOWN: 0
HOW OFTEN DURING THE LAST YEAR HAVE YOU FOUND THAT YOU WERE NOT ABLE TO STOP DRINKING ONCE YOU HAD STARTED: 0
HOW MANY STANDARD DRINKS CONTAINING ALCOHOL DO YOU HAVE ON A TYPICAL DAY: 0
HOW OFTEN DURING THE LAST YEAR HAVE YOU NEEDED AN ALCOHOLIC DRINK FIRST THING IN THE MORNING TO GET YOURSELF GOING AFTER A NIGHT OF HEAVY DRINKING: 0
AUDIT TOTAL SCORE: 2
HOW OFTEN DURING THE LAST YEAR HAVE YOU FAILED TO DO WHAT WAS NORMALLY EXPECTED FROM YOU BECAUSE OF DRINKING: 0
HOW OFTEN DURING THE LAST YEAR HAVE YOU HAD A FEELING OF GUILT OR REMORSE AFTER DRINKING: 0
HOW OFTEN DO YOU HAVE A DRINK CONTAINING ALCOHOL: 2
HOW OFTEN DO YOU HAVE SIX OR MORE DRINKS ON ONE OCCASION: 0

## 2018-12-27 ASSESSMENT — ANXIETY QUESTIONNAIRES: GAD7 TOTAL SCORE: 0

## 2018-12-27 NOTE — PROGRESS NOTES
you need?: Yes  Do you have a Living Will?: (!) No  General Health Risk Interventions:  · needs  ,living  will     Health Habits/Nutrition:  Health Habits/Nutrition  Do you exercise for at least 20 minutes 2-3 times per week?: (!) No  Have you lost any weight without trying in the past 3 months?: No  Do you eat fewer than 2 meals per day?: No  Have you seen a dentist within the past year?: (!) No  Body mass index is 36 kg/m².   Health Habits/Nutrition Interventions:  · stable     Hearing/Vision:  Hearing/Vision  Do you or your family notice any trouble with your hearing?: (!) Yes  Do you have difficulty driving, watching TV, or doing any of your daily activities because of your eyesight?: No  Have you had an eye exam within the past year?: Yes  Hearing/Vision Interventions:  ·  stable    Safety:  Safety  Do you have working smoke detectors?: Yes  Have all throw rugs been removed or fastened?: (!) No  Do you have non-slip mats in all bathtubs?: Yes  Are your doorways, halls and stairs free of clutter?: Yes  Do you always fasten your seatbelt when you are in a car?: Yes  Safety Interventions:  · Home safety tips provided    Personalized Preventive Plan   Current Health Maintenance Status  Immunization History   Administered Date(s) Administered    Influenza Virus Vaccine 01/29/2001, 10/22/2013, 10/06/2015    Influenza, Jil Litten, 3 Years and older, IM (Fluzone 3 yrs and older or Afluria 5 yrs and older) 09/29/2016    Influenza, Jil Litten, 6 mo and older, IM (Flulaval) 11/21/2017    Pneumococcal 13-valent Conjugate ([id number]) 10/06/2015    Pneumococcal Polysaccharide ([id number]) 08/17/2011        Health Maintenance   Topic Date Due    DTaP/Tdap/Td vaccine (1 - Tdap) 03/16/1963    Shingles Vaccine (1 of 2 - 2 Dose Series) 03/16/1994    Flu vaccine (1) 09/01/2018    PSA counseling  09/30/2018    Potassium monitoring  11/15/2019    Creatinine monitoring  11/15/2019    Colon cancer screen colonoscopy  07/02/2022   

## 2019-01-07 ENCOUNTER — TELEPHONE (OUTPATIENT)
Dept: CARDIOLOGY CLINIC | Age: 74
End: 2019-01-07

## 2019-02-04 ENCOUNTER — TELEPHONE (OUTPATIENT)
Dept: CARDIOLOGY CLINIC | Age: 74
End: 2019-02-04

## 2019-02-04 ENCOUNTER — HOSPITAL ENCOUNTER (OUTPATIENT)
Dept: NON INVASIVE DIAGNOSTICS | Age: 74
Discharge: HOME | End: 2019-02-04
Payer: MEDICARE

## 2019-02-04 DIAGNOSIS — I50.32 CHRONIC DIASTOLIC CONGESTIVE HEART FAILURE (HCC): ICD-10-CM

## 2019-02-04 DIAGNOSIS — E78.00 PURE HYPERCHOLESTEROLEMIA: Chronic | ICD-10-CM

## 2019-02-04 DIAGNOSIS — I25.10 CORONARY ARTERY DISEASE INVOLVING NATIVE CORONARY ARTERY OF NATIVE HEART WITHOUT ANGINA PECTORIS: ICD-10-CM

## 2019-02-04 DIAGNOSIS — R60.0 BILATERAL LEG EDEMA: ICD-10-CM

## 2019-02-04 LAB
LV EF: 53 %
LVEF MODALITY: NORMAL

## 2019-02-04 PROCEDURE — 93306 TTE W/DOPPLER COMPLETE: CPT

## 2019-02-26 ENCOUNTER — HOSPITAL ENCOUNTER (OUTPATIENT)
Age: 74
Discharge: HOME | End: 2019-02-26
Payer: MEDICARE

## 2019-02-26 ENCOUNTER — OFFICE VISIT (OUTPATIENT)
Dept: CARDIOLOGY CLINIC | Age: 74
End: 2019-02-26
Payer: MEDICARE

## 2019-02-26 VITALS
WEIGHT: 257.2 LBS | DIASTOLIC BLOOD PRESSURE: 74 MMHG | HEART RATE: 74 BPM | HEIGHT: 70 IN | BODY MASS INDEX: 36.82 KG/M2 | SYSTOLIC BLOOD PRESSURE: 122 MMHG

## 2019-02-26 DIAGNOSIS — I50.32 CHRONIC DIASTOLIC CONGESTIVE HEART FAILURE (HCC): Primary | ICD-10-CM

## 2019-02-26 DIAGNOSIS — I25.10 CORONARY ARTERY DISEASE INVOLVING NATIVE CORONARY ARTERY OF NATIVE HEART WITHOUT ANGINA PECTORIS: ICD-10-CM

## 2019-02-26 DIAGNOSIS — I50.32 CHRONIC DIASTOLIC CONGESTIVE HEART FAILURE (HCC): ICD-10-CM

## 2019-02-26 DIAGNOSIS — E78.00 PURE HYPERCHOLESTEROLEMIA: Chronic | ICD-10-CM

## 2019-02-26 PROBLEM — R60.0 BILATERAL LEG EDEMA: Status: RESOLVED | Noted: 2018-12-19 | Resolved: 2019-02-26

## 2019-02-26 LAB
ALBUMIN SERPL-MCNC: 4.2 G/DL (ref 3.5–5.1)
ALP BLD-CCNC: 84 U/L (ref 38–126)
ALT SERPL-CCNC: 49 U/L (ref 11–66)
ANION GAP SERPL CALCULATED.3IONS-SCNC: 13 MEQ/L (ref 8–16)
AST SERPL-CCNC: 39 U/L (ref 5–40)
BILIRUB SERPL-MCNC: 0.7 MG/DL (ref 0.3–1.2)
BILIRUBIN DIRECT: < 0.2 MG/DL (ref 0–0.3)
BUN BLDV-MCNC: 16 MG/DL (ref 7–22)
CALCIUM SERPL-MCNC: 9.9 MG/DL (ref 8.5–10.5)
CHLORIDE BLD-SCNC: 103 MEQ/L (ref 98–111)
CHOLESTEROL, TOTAL: 166 MG/DL (ref 100–199)
CO2: 26 MEQ/L (ref 23–33)
CREAT SERPL-MCNC: 0.8 MG/DL (ref 0.4–1.2)
GFR SERPL CREATININE-BSD FRML MDRD: > 90 ML/MIN/1.73M2
GLUCOSE BLD-MCNC: 130 MG/DL (ref 70–108)
HDLC SERPL-MCNC: 48 MG/DL
LDL CHOLESTEROL CALCULATED: 93 MG/DL
MAGNESIUM: 1.9 MG/DL (ref 1.6–2.4)
POTASSIUM SERPL-SCNC: 4.3 MEQ/L (ref 3.5–5.2)
SODIUM BLD-SCNC: 142 MEQ/L (ref 135–145)
TOTAL PROTEIN: 6.6 G/DL (ref 6.1–8)
TRIGL SERPL-MCNC: 124 MG/DL (ref 0–199)

## 2019-02-26 PROCEDURE — 80061 LIPID PANEL: CPT

## 2019-02-26 PROCEDURE — 82248 BILIRUBIN DIRECT: CPT

## 2019-02-26 PROCEDURE — 36415 COLL VENOUS BLD VENIPUNCTURE: CPT

## 2019-02-26 PROCEDURE — 99214 OFFICE O/P EST MOD 30 MIN: CPT | Performed by: INTERNAL MEDICINE

## 2019-02-26 PROCEDURE — 83735 ASSAY OF MAGNESIUM: CPT

## 2019-02-26 PROCEDURE — 80053 COMPREHEN METABOLIC PANEL: CPT

## 2019-03-08 DIAGNOSIS — I25.10 CORONARY ARTERY DISEASE INVOLVING NATIVE CORONARY ARTERY OF NATIVE HEART WITHOUT ANGINA PECTORIS: ICD-10-CM

## 2019-03-09 RX ORDER — PANTOPRAZOLE SODIUM 40 MG/1
TABLET, DELAYED RELEASE ORAL
Qty: 90 TABLET | Refills: 3 | Status: SHIPPED | OUTPATIENT
Start: 2019-03-09 | End: 2020-04-02

## 2019-03-09 RX ORDER — POTASSIUM CHLORIDE 20 MEQ/1
TABLET, EXTENDED RELEASE ORAL
Qty: 90 TABLET | Refills: 3 | Status: SHIPPED | OUTPATIENT
Start: 2019-03-09 | End: 2020-04-02

## 2019-03-12 DIAGNOSIS — I25.10 CORONARY ARTERY DISEASE INVOLVING NATIVE CORONARY ARTERY OF NATIVE HEART WITHOUT ANGINA PECTORIS: ICD-10-CM

## 2019-03-13 RX ORDER — METOPROLOL SUCCINATE 25 MG/1
TABLET, EXTENDED RELEASE ORAL
Qty: 45 TABLET | Refills: 1 | Status: SHIPPED | OUTPATIENT
Start: 2019-03-13 | End: 2019-08-20 | Stop reason: SDUPTHER

## 2019-03-18 ENCOUNTER — TELEPHONE (OUTPATIENT)
Dept: FAMILY MEDICINE CLINIC | Age: 74
End: 2019-03-18

## 2019-03-18 DIAGNOSIS — M16.11 PRIMARY OSTEOARTHRITIS OF RIGHT HIP: Primary | ICD-10-CM

## 2019-04-08 ENCOUNTER — OFFICE VISIT (OUTPATIENT)
Dept: CARDIOLOGY CLINIC | Age: 74
End: 2019-04-08
Payer: MEDICARE

## 2019-04-08 VITALS
WEIGHT: 250.8 LBS | SYSTOLIC BLOOD PRESSURE: 124 MMHG | HEIGHT: 71 IN | BODY MASS INDEX: 35.11 KG/M2 | HEART RATE: 69 BPM | DIASTOLIC BLOOD PRESSURE: 78 MMHG

## 2019-04-08 DIAGNOSIS — R06.02 SOB (SHORTNESS OF BREATH) ON EXERTION: ICD-10-CM

## 2019-04-08 DIAGNOSIS — E78.00 PURE HYPERCHOLESTEROLEMIA: Chronic | ICD-10-CM

## 2019-04-08 DIAGNOSIS — I50.32 CHRONIC DIASTOLIC CONGESTIVE HEART FAILURE (HCC): ICD-10-CM

## 2019-04-08 DIAGNOSIS — Z01.818 PRE-OP EVALUATION: Primary | ICD-10-CM

## 2019-04-08 DIAGNOSIS — I25.10 CORONARY ARTERY DISEASE INVOLVING NATIVE CORONARY ARTERY OF NATIVE HEART WITHOUT ANGINA PECTORIS: ICD-10-CM

## 2019-04-08 PROCEDURE — 99214 OFFICE O/P EST MOD 30 MIN: CPT | Performed by: INTERNAL MEDICINE

## 2019-04-08 RX ORDER — SPIRONOLACTONE AND HYDROCHLOROTHIAZIDE 25; 25 MG/1; MG/1
1 TABLET ORAL DAILY
Qty: 30 TABLET | Refills: 5 | Status: SHIPPED | OUTPATIENT
Start: 2019-04-08 | End: 2019-04-12

## 2019-04-08 NOTE — PROGRESS NOTES
Chief Complaint   Patient presents with    Pre-op Exam   Former pat of Dr. Sherrell Yeung  Hx of CAD s/p stent 8yrs back        Pre-op for right total hip with Dr. Jocelyn Taylor.     METS< 4    Leg edema better after aldactazide    Sob on exertion - chronic    Denied Chest pain , palpitations or  dizziness    Lost 3 lb since  2 months    EKG done on 11-      Patient Active Problem List   Diagnosis    CAD (coronary artery disease)    Kidney stone on left side    CAD (coronary artery disease)    DVT of proximal leg (deep vein thrombosis) (HCC)    Diverticulosis of colon    Hyperlipidemia    Obesity    GERD (gastroesophageal reflux disease)    Colon polyps    Pulmonary embolus (HCC)    Sleep apnea    Chronic back pain    Chronic diastolic congestive heart failure ([address]    Pre-op evaluation- for RT hip replacement    SOB (shortness of breath) on exertion       Past Surgical History:   Procedure Laterality Date    BACK SURGERY  5-    Dr Houston Wolf @ [facility])    [address]  2014       50%  lesion    bamdad    COLONOSCOPY  2012      jean    CORONARY ANGIOPLASTY WITH STENT PLACEMENT  2006    LAD    EYE SURGERY  2011    bilateral    HERNIA REPAIR  17/36    umbilical    VENA CAVA FILTER PLACEMENT  2016    placed  iin  1-2014  and  removed  5-2016       No Known Allergies     Family History   Problem Relation Age of Onset    Heart Disease Father         Social History     Socioeconomic History    Marital status:      Spouse name: Not on file    Number of children: Not on file    Years of education: Not on file    Highest education level: Not on file   Occupational History    Not on file   Social Needs    Financial resource strain: Not on file    Food insecurity:     Worry: Not on file     Inability: Not on file    Transportation needs:     Medical: Not on file     Non-medical: Not on file   Tobacco Use    Smoking status: Never Smoker    Smokeless tobacco: Never Used   Substance and Sexual Activity    Alcohol use: Yes     Alcohol/week: 2.0 oz     Types: 4 Standard drinks or equivalent per week    Drug use: No    Sexual activity: Yes     Partners: Female   Lifestyle    Physical activity:     Days per week: Not on file     Minutes per session: Not on file    Stress: Not on file   Relationships    Social connections:     Talks on phone: Not on file     Gets together: Not on file     Attends Congregation service: Not on file     Active member of club or organization: Not on file     Attends meetings of clubs or organizations: Not on file     Relationship status: Not on file    Intimate partner violence:     Fear of current or ex partner: Not on file     Emotionally abused: Not on file     Physically abused: Not on file     Forced sexual activity: Not on file   Other Topics Concern    Not on file   Social History Narrative    Not on file       Current Outpatient Medications   Medication Sig Dispense Refill    spironolactone-hydrochlorothiazide (ALDACTAZIDE) 25-25 MG per tablet Take 1 tablet by mouth daily 30 tablet 5    metoprolol succinate (TOPROL XL) 25 MG extended release tablet TAKE 1/2 TABLET BY MOUTH ONE TIME A DAY  45 tablet 1    potassium chloride (KLOR-CON M) 20 MEQ extended release tablet TAKE 1 TABLET BY MOUTH ONE TIME A DAY  90 tablet 3    pantoprazole (PROTONIX) 40 MG tablet TAKE 1 TABLET BY MOUTH ONE TIME A DAY  90 tablet 3    atorvastatin (LIPITOR) 80 MG tablet TAKE 1 TABLET BY MOUTH ONE TIME A DAY  2    folic acid (FOLVITE) 1 MG tablet TAKE 1 TABLET BY MOUTH ONE TIME A DAY  60 tablet 3    JANTOVEN 5 MG tablet take as directed by coumadin clinic 120 tabs=90 days 120 tablet 2    Glucosamine-Chondroitin--200-150 MG TABS Take 2 tablets by mouth daily      clotrimazole-betamethasone (LOTRISONE) 1-0.05 % cream Apply topically 2 times daily.  Lotrisone 1-0.05% Cream 45 g 3    Ascorbic Acid (VITAMIN C) 1000 MG tablet Take 1,000 mg by mouth daily      Multiple Vitamins-Minerals (MULTI COMPLETE PO) Take 1 tablet by mouth daily      Omega-3 Fatty Acids (FISH OIL) 1000 MG CAPS Take 3,000 mg by mouth daily      aspirin 81 MG EC tablet Take 81 mg by mouth daily. No current facility-administered medications for this visit. Review of Systems -     General ROS: negative  Psychological ROS: negative  Hematological and Lymphatic ROS: No history of blood clots or bleeding disorder. Respiratory ROS: no cough,  or wheezing, the rest see HPI  Cardiovascular ROS: See HPI  Gastrointestinal ROS: negative  Genito-Urinary ROS: no dysuria, trouble voiding, or hematuria  Musculoskeletal ROS: negative  Neurological ROS: no TIA or stroke symptoms  Dermatological ROS: negative      Blood pressure 124/78, pulse 69, height 5' 11\" (1.803 m), weight 250 lb 12.8 oz (113.8 kg). Physical Examination:    General appearance - alert, well appearing, and in no distress  HEENT- Pink conjunctiva  , Non-icteri sclera,PERRLA  Mental status - alert, oriented to person, place, and time  Neck - supple, no significant adenopathy, no JVD, or carotid bruits  Chest - clear to auscultation, no wheezes, rales or rhonchi, symmetric air entry  Heart - normal rate, regular rhythm, normal S1, S2, no murmurs, rubs, clicks or gallops  Abdomen - soft, nontender, nondistended, no masses or organomegaly  MIN- no CVA or flank tenderness, no suprapubic tenderness  Neurological - alert, oriented, normal speech, no focal findings or movement disorder noted  Musculoskeletal/limbs - no joint tenderness, deformity or swelling   - peripheral pulses normal, no pedal edema, no clubbing or cyanosis  Skin - normal coloration and turgor, no rashes, no suspicious skin lesions noted  Psych- appropriate mood and affect    Lab  No results for input(s): CKTOTAL, CKMB, CKMBINDEX, TROPONINI in the last 72 hours.   CBC:   Lab Results   Component Value Date    WBC 6.5 11/15/2018    RBC 4.76 11/15/2018 RBC 5.10 08/22/2018    HGB 14.6 11/15/2018    HCT 43.9 11/15/2018    MCV 92.2 11/15/2018    MCH 30.7 11/15/2018    MCHC 33.3 11/15/2018    RDW 13.1 08/22/2018     11/15/2018    MPV 9.9 11/15/2018     BMP:    Lab Results   Component Value Date     02/26/2019    K 4.3 02/26/2019     02/26/2019    CO2 26 02/26/2019    BUN 16 02/26/2019    LABALBU 4.2 02/26/2019    CREATININE 0.8 02/26/2019    CALCIUM 9.9 02/26/2019    LABGLOM >90 02/26/2019    GLUCOSE 130 02/26/2019    GLUCOSE 103 08/22/2018     Hepatic Function Panel:    Lab Results   Component Value Date    ALKPHOS 84 02/26/2019    ALT 49 02/26/2019    AST 39 02/26/2019    PROT 6.6 02/26/2019    BILITOT 0.7 02/26/2019    BILIDIR <0.2 02/26/2019    LABALBU 4.2 02/26/2019     Magnesium:    Lab Results   Component Value Date    MG 1.9 02/26/2019     Warfarin PT/INR:  No components found for: PTPATWAR, PTINRWAR  HgBA1c:  No results found for: LABA1C  FLP:    Lab Results   Component Value Date    TRIG 124 02/26/2019    HDL 48 02/26/2019    LDLCALC 93 02/26/2019     TSH:    Lab Results   Component Value Date    TSH 1.383 07/04/2012       EKG 12/19/18  NSR No acute ekg abn    Cardiac cath 02/2014   Mild to mod CAD 40 to 50% stenosis and LAD stent done 2006 was patent    Conclusions      Summary   Normal left ventricle size and Low Normal LV systolic function.   Ejection fraction was estimated at 50 to 55 %.   There were no regional left ventricular wall motion abnormalities and wall   thickness was within normal limits.   Doppler parameters were consistent with abnormal left ventricular   relaxation (grade 1 diastolic dysfunction).   The left atrium is Mildly dilated.   Mildly enlarged right atrium size.   Mildly enlarged right ventricle cavity.   Right ventricle global systolic function is normal .      Signature      ----------------------------------------------------------------   Electronically signed by Rafa Dawn MD (Interpreting   physician) on 02/04/2019 at 06:02 PM    Assessment   Diagnosis Orders   1. Pre-op evaluation- for RT hip replacement  Stress test, lexiscan   2. Chronic diastolic congestive heart failure (HCC)  Stress test, lexiscan   3. Coronary artery disease involving native coronary artery of native heart without angina pectoris  Stress test, lexiscan   4. Pure hypercholesterolemia  Stress test, lexiscan   5. SOB (shortness of breath) on exertion           Plan   The current  meds and labs reviewed    Pre op eval  METS< 4  Sob  Need nuc stress  May proceed with mod risk    Continue the current treatment and with constant vigilance to changes in symptoms and also any potential side effects. Return for care or seek medical attention immediately if symptoms got worse and/or develop new symptoms. Coronary artery disease, seems to be stable. Denies angina or change in breathing pattern  The cath report from [facility] reviewed and stable done 2014  Echo WNL     Hyperlipidemia: on statins, followed periodically. Patient need periodic lipid and liver profile.   Lipid panel and liver function test before next appointment asap    Congestive heart failure: no evidence of fluid overload today, no recent hospitalization for CHF   Leg edema +2- now +1  inctease aldactazide 25/25 1 tab po qd  BMP and Mg stable    Chronic K intake prior to aldactazide   May had low K    Lipid panel and liver function test before next appointment      RTC in 4 month    [facility]

## 2019-04-11 ENCOUNTER — HOSPITAL ENCOUNTER (OUTPATIENT)
Dept: NON INVASIVE DIAGNOSTICS | Age: 74
Discharge: HOME | End: 2019-04-11
Payer: MEDICARE

## 2019-04-11 DIAGNOSIS — I25.10 CORONARY ARTERY DISEASE INVOLVING NATIVE CORONARY ARTERY OF NATIVE HEART WITHOUT ANGINA PECTORIS: ICD-10-CM

## 2019-04-11 DIAGNOSIS — Z01.818 PRE-OP EVALUATION: ICD-10-CM

## 2019-04-11 DIAGNOSIS — E78.00 PURE HYPERCHOLESTEROLEMIA: Chronic | ICD-10-CM

## 2019-04-11 DIAGNOSIS — I50.32 CHRONIC DIASTOLIC CONGESTIVE HEART FAILURE (HCC): ICD-10-CM

## 2019-04-11 PROCEDURE — 78452 HT MUSCLE IMAGE SPECT MULT: CPT

## 2019-04-11 PROCEDURE — 93017 CV STRESS TEST TRACING ONLY: CPT | Performed by: INTERNAL MEDICINE

## 2019-04-11 PROCEDURE — A9500 TC99M SESTAMIBI: HCPCS | Performed by: INTERNAL MEDICINE

## 2019-04-11 PROCEDURE — 3430000000 HC RX DIAGNOSTIC RADIOPHARMACEUTICAL: Performed by: INTERNAL MEDICINE

## 2019-04-11 PROCEDURE — 2709999900 HC NON-CHARGEABLE SUPPLY

## 2019-04-11 PROCEDURE — 6360000002 HC RX W HCPCS

## 2019-04-11 RX ADMIN — Medication 31 MILLICURIE: at 14:53

## 2019-04-11 RX ADMIN — Medication 8.9 MILLICURIE: at 13:56

## 2019-04-12 ENCOUNTER — TELEPHONE (OUTPATIENT)
Dept: CARDIOLOGY CLINIC | Age: 74
End: 2019-04-12

## 2019-04-12 ENCOUNTER — OFFICE VISIT (OUTPATIENT)
Dept: CARDIOLOGY CLINIC | Age: 74
End: 2019-04-12
Payer: MEDICARE

## 2019-04-12 VITALS
SYSTOLIC BLOOD PRESSURE: 136 MMHG | HEART RATE: 63 BPM | DIASTOLIC BLOOD PRESSURE: 78 MMHG | HEIGHT: 71 IN | WEIGHT: 251.5 LBS | BODY MASS INDEX: 35.21 KG/M2

## 2019-04-12 DIAGNOSIS — R06.02 SOB (SHORTNESS OF BREATH) ON EXERTION: ICD-10-CM

## 2019-04-12 DIAGNOSIS — I50.32 CHRONIC DIASTOLIC CONGESTIVE HEART FAILURE (HCC): ICD-10-CM

## 2019-04-12 DIAGNOSIS — R94.39 ABNORMAL NUCLEAR STRESS TEST: ICD-10-CM

## 2019-04-12 DIAGNOSIS — I25.10 CORONARY ARTERY DISEASE INVOLVING NATIVE CORONARY ARTERY OF NATIVE HEART, ANGINA PRESENCE UNSPECIFIED: ICD-10-CM

## 2019-04-12 DIAGNOSIS — Z01.818 PRE-OP EVALUATION: Primary | ICD-10-CM

## 2019-04-12 DIAGNOSIS — Z95.820 S/P ANGIOPLASTY WITH STENT: ICD-10-CM

## 2019-04-12 DIAGNOSIS — Z98.890 S/P CARDIAC CATH: ICD-10-CM

## 2019-04-12 PROCEDURE — 99213 OFFICE O/P EST LOW 20 MIN: CPT | Performed by: INTERNAL MEDICINE

## 2019-04-12 RX ORDER — SPIRONOLACTONE AND HYDROCHLOROTHIAZIDE 25; 25 MG/1; MG/1
0.5 TABLET ORAL DAILY
Qty: 30 TABLET | Refills: 3
Start: 2019-04-12 | End: 2020-04-01

## 2019-04-12 NOTE — PROGRESS NOTES
Years of education: Not on file    Highest education level: Not on file   Occupational History    Not on file   Social Needs    Financial resource strain: Not on file    Food insecurity:     Worry: Not on file     Inability: Not on file    Transportation needs:     Medical: Not on file     Non-medical: Not on file   Tobacco Use    Smoking status: Never Smoker    Smokeless tobacco: Never Used   Substance and Sexual Activity    Alcohol use:  Yes     Alcohol/week: 2.0 oz     Types: 4 Standard drinks or equivalent per week    Drug use: No    Sexual activity: Yes     Partners: Female   Lifestyle    Physical activity:     Days per week: Not on file     Minutes per session: Not on file    Stress: Not on file   Relationships    Social connections:     Talks on phone: Not on file     Gets together: Not on file     Attends Anabaptist service: Not on file     Active member of club or organization: Not on file     Attends meetings of clubs or organizations: Not on file     Relationship status: Not on file    Intimate partner violence:     Fear of current or ex partner: Not on file     Emotionally abused: Not on file     Physically abused: Not on file     Forced sexual activity: Not on file   Other Topics Concern    Not on file   Social History Narrative    Not on file       Current Outpatient Medications   Medication Sig Dispense Refill    enoxaparin (LOVENOX) 120 MG/0.8ML injection INJ THE CONTENTS OF 1 SYRINGE INTO THE SKIN Q 12 H UTD BY COUMADIN CLINIC  0    spironolactone-hydrochlorothiazide (ALDACTAZIDE) 25-25 MG per tablet Take 0.5 tablets by mouth daily 30 tablet 3    metoprolol succinate (TOPROL XL) 25 MG extended release tablet TAKE 1/2 TABLET BY MOUTH ONE TIME A DAY  45 tablet 1    potassium chloride (KLOR-CON M) 20 MEQ extended release tablet TAKE 1 TABLET BY MOUTH ONE TIME A DAY  90 tablet 3    pantoprazole (PROTONIX) 40 MG tablet TAKE 1 TABLET BY MOUTH ONE TIME A DAY  90 tablet 3    atorvastatin (LIPITOR) 80 MG tablet TAKE 1 TABLET BY MOUTH ONE TIME A DAY  2    folic acid (FOLVITE) 1 MG tablet TAKE 1 TABLET BY MOUTH ONE TIME A DAY  60 tablet 3    JANTOVEN 5 MG tablet take as directed by coumadin clinic 120 tabs=90 days 120 tablet 2    Glucosamine-Chondroitin--200-150 MG TABS Take 2 tablets by mouth daily      clotrimazole-betamethasone (LOTRISONE) 1-0.05 % cream Apply topically 2 times daily. Lotrisone 1-0.05% Cream 45 g 3    Ascorbic Acid (VITAMIN C) 1000 MG tablet Take 1,000 mg by mouth daily      Multiple Vitamins-Minerals (MULTI COMPLETE PO) Take 1 tablet by mouth daily      Omega-3 Fatty Acids (FISH OIL) 1000 MG CAPS Take 3,000 mg by mouth daily      aspirin 81 MG EC tablet Take 81 mg by mouth daily. No current facility-administered medications for this visit. Review of Systems -     General ROS: negative  Psychological ROS: negative  Hematological and Lymphatic ROS: No history of blood clots or bleeding disorder. Respiratory ROS: no cough,  or wheezing, the rest see HPI  Cardiovascular ROS: See HPI  Gastrointestinal ROS: negative  Genito-Urinary ROS: no dysuria, trouble voiding, or hematuria  Musculoskeletal ROS: negative  Neurological ROS: no TIA or stroke symptoms  Dermatological ROS: negative      Blood pressure 136/78, pulse 63, height 5' 11\" (1.803 m), weight 251 lb 8 oz (114.1 kg).         Physical Examination:    General appearance - alert, well appearing, and in no distress  HEENT- Pink conjunctiva  , Non-icteri sclera,PERRLA  Mental status - alert, oriented to person, place, and time  Neck - supple, no significant adenopathy, no JVD, or carotid bruits  Chest - clear to auscultation, no wheezes, rales or rhonchi, symmetric air entry  Heart - normal rate, regular rhythm, normal S1, S2, no murmurs, rubs, clicks or gallops  Abdomen - soft, nontender, nondistended, no masses or organomegaly  MIN- no CVA or flank tenderness, no suprapubic tenderness  Neurological - alert, oriented, normal speech, no focal findings or movement disorder noted  Musculoskeletal/limbs - no joint tenderness, deformity or swelling   - peripheral pulses normal, no pedal edema, no clubbing or cyanosis  Skin - normal coloration and turgor, no rashes, no suspicious skin lesions noted  Psych- appropriate mood and affect    Lab  No results for input(s): CKTOTAL, CKMB, CKMBINDEX, TROPONINI in the last 72 hours.   CBC:   Lab Results   Component Value Date    WBC 6.5 11/15/2018    RBC 4.76 11/15/2018    RBC 5.10 08/22/2018    HGB 14.6 11/15/2018    HCT 43.9 11/15/2018    MCV 92.2 11/15/2018    MCH 30.7 11/15/2018    MCHC 33.3 11/15/2018    RDW 13.1 08/22/2018     11/15/2018    MPV 9.9 11/15/2018     BMP:    Lab Results   Component Value Date     02/26/2019    K 4.3 02/26/2019     02/26/2019    CO2 26 02/26/2019    BUN 16 02/26/2019    LABALBU 4.2 02/26/2019    CREATININE 0.8 02/26/2019    CALCIUM 9.9 02/26/2019    LABGLOM >90 02/26/2019    GLUCOSE 130 02/26/2019    GLUCOSE 103 08/22/2018     Hepatic Function Panel:    Lab Results   Component Value Date    ALKPHOS 84 02/26/2019    ALT 49 02/26/2019    AST 39 02/26/2019    PROT 6.6 02/26/2019    BILITOT 0.7 02/26/2019    BILIDIR <0.2 02/26/2019    LABALBU 4.2 02/26/2019     Magnesium:    Lab Results   Component Value Date    MG 1.9 02/26/2019     Warfarin PT/INR:  No components found for: PTPATWAR, PTINRWAR  HgBA1c:  No results found for: LABA1C  FLP:    Lab Results   Component Value Date    TRIG 124 02/26/2019    HDL 48 02/26/2019    LDLCALC 93 02/26/2019     TSH:    Lab Results   Component Value Date    TSH 1.383 07/04/2012       EKG 12/19/18  NSR No acute ekg abn    Cardiac cath 02/2014   Mild to mod CAD 40 to 50% stenosis and LAD stent done 2006 was patent    Conclusions      Summary   Normal left ventricle size and Low Normal LV systolic function.   Ejection fraction was estimated at 50 to 55 %.   There were no regional left ventricular wall motion abnormalities and wall   thickness was within normal limits.   Doppler parameters were consistent with abnormal left ventricular   relaxation (grade 1 diastolic dysfunction).   The left atrium is Mildly dilated.   Mildly enlarged right atrium size.   Mildly enlarged right ventricle cavity.   Right ventricle global systolic function is normal .      Signature      ----------------------------------------------------------------   Electronically signed by Violet Melendez MD (Interpreting   physician) on 02/04/2019 at 06:02 PM    Conclusions      Summary   No ischemic EKG changes.   Nonspecific ST-T wave changes.   The T.I.D. ratio was 1.44 . ( Visually noted as well)   The nuclear images is suggestive for mild myocardial ischemia in the   inferior wall.      Recommendation   Clinical correlation is recommended.      Signatures      ----------------------------------------------------------------   Electronically signed by Violet Melendez MD (Interpreting   Cardiologist) on 04/11/2019 at 18:19   ----------------------------------------------------------------      Assessment   Diagnosis Orders   1. Pre-op evaluation- for RT hip replacement     2. SOB (shortness of breath) on exertion     3. Abnormal nuclear stress test     4. Coronary artery disease involving native coronary artery of native heart, angina presence unspecified     5. Chronic diastolic congestive heart failure ([address]     6. S/P cardiac cath 2014 at [facility]- patent LAD stent done 2006 and 40 to 50% lesions in other coronaries     7.  S/P angioplasty with stent of the LAD 2014           Plan   The current  meds and labs reviewed    Pre op eval  METS< 4  Sob ON EXERTION  Need nuc stress- abn  NEED CARDIAC CATH PRIOR TO INTENDED SURGERY  The risk and benefit of left heart cath has been explain in detail including but not limited to  Bleeding including retroperitoneal bleed 1%, infection, MI, CVA, JOSE, Limb loss, dissection, allergic reaction,death  Each of them 1 in 2000 range. The alternative managment has been explained. Patient expressed understanding of the risk and benefit and of the alternative managment well. Patient wanted and agreed to proceed with left heart cath. Hence we will schedule him for [facility]         Continue the current treatment and with constant vigilance to changes in symptoms and also any potential side effects. Return for care or seek medical attention immediately if symptoms got worse and/or develop new symptoms. Coronary artery disease, seems to be stable. Denies angina or change in breathing pattern  hX OF lad STENT   The cath report from [facility] reviewed and stable done   Echo WNL     Hyperlipidemia: on statins, followed periodically. Patient need periodic lipid and liver profile.   Lipid panel and liver function test before next appointment asap    Congestive heart failure: no evidence of fluid overload today, no recent hospitalization for CHF   Leg edema +2--RESOLVED   aldactazide 25/25 1/2 tab po qd  BMP and Mg stable    Chronic K intake prior to aldactazide   May had low K    Lipid panel and liver function test before next appointment      RTC in 4 month    [facility]

## 2019-04-16 ENCOUNTER — PREP FOR PROCEDURE (OUTPATIENT)
Dept: CARDIOLOGY | Age: 74
End: 2019-04-16

## 2019-04-16 RX ORDER — SODIUM CHLORIDE 0.9 % (FLUSH) 0.9 %
10 SYRINGE (ML) INJECTION PRN
Status: CANCELLED | OUTPATIENT
Start: 2019-04-16

## 2019-04-16 RX ORDER — NITROGLYCERIN 0.4 MG/1
0.4 TABLET SUBLINGUAL EVERY 5 MIN PRN
Status: CANCELLED | OUTPATIENT
Start: 2019-04-16

## 2019-04-16 RX ORDER — SODIUM CHLORIDE 9 MG/ML
INJECTION, SOLUTION INTRAVENOUS CONTINUOUS
Status: CANCELLED | OUTPATIENT
Start: 2019-04-16

## 2019-04-16 RX ORDER — ASPIRIN 325 MG
325 TABLET ORAL ONCE
Status: CANCELLED | OUTPATIENT
Start: 2019-04-16 | End: 2019-04-16

## 2019-04-16 RX ORDER — SODIUM CHLORIDE 0.9 % (FLUSH) 0.9 %
10 SYRINGE (ML) INJECTION EVERY 12 HOURS SCHEDULED
Status: CANCELLED | OUTPATIENT
Start: 2019-04-16

## 2019-04-17 PROBLEM — Z98.890 S/P CARDIAC CATH: Status: ACTIVE | Noted: 2019-04-17

## 2019-04-22 ENCOUNTER — TELEPHONE (OUTPATIENT)
Dept: CARDIOLOGY CLINIC | Age: 74
End: 2019-04-22

## 2019-04-22 NOTE — TELEPHONE ENCOUNTER
Summer Patel at surgeons office notified of clearance via vm. Her phone number is [phone]  Fax # is [phone]. Patient also notified and voiced understanding.

## 2019-05-01 LAB
INR BLD: 1.1 (ref 2–3)
PROTHROMBIN TIME: 13.4 SEC (ref 8.7–13.3)

## 2019-05-03 LAB
INR BLD: 1.2 (ref 2–3)
PROTHROMBIN TIME: 14.1 SEC (ref 8.7–13.3)

## 2019-05-06 ENCOUNTER — TELEPHONE (OUTPATIENT)
Dept: FAMILY MEDICINE CLINIC | Age: 74
End: 2019-05-06

## 2019-05-06 LAB
INR BLD: 1.6 (ref 2–3)
PROTHROMBIN TIME: 19.6 SEC (ref 8.7–13.3)

## 2019-05-06 NOTE — TELEPHONE ENCOUNTER
----- Message from Tessa Charles MD sent at 5/6/2019  6:09 AM EDT -----  inr done as believe for procedure thus why on lovenox

## 2019-05-07 NOTE — TELEPHONE ENCOUNTER
Pt said that he has three more doses of the Lovenox and then will resume the coumadin. He had a hip replacement.

## 2019-05-08 PROBLEM — Z01.818 PRE-OP EVALUATION: Status: RESOLVED | Noted: 2019-04-08 | Resolved: 2019-05-08

## 2019-05-08 LAB
INR BLD: 2.2 (ref 2–3)
PROTHROMBIN TIME: 26.3 SEC (ref 8.7–13.3)

## 2019-05-09 ENCOUNTER — TELEPHONE (OUTPATIENT)
Dept: FAMILY MEDICINE CLINIC | Age: 74
End: 2019-05-09

## 2019-05-09 NOTE — TELEPHONE ENCOUNTER
----- Message from Constance Horvath MD sent at 5/8/2019 10:34 PM EDT -----  INR is ok and ?  [facility]ia [facility] coumadin clinic

## 2019-05-10 LAB
INR BLD: 2.2 (ref 2–3)
PROTHROMBIN TIME: 26.7 SEC (ref 8.7–13.3)

## 2019-05-17 LAB
INR BLD: 2 (ref 2–3)
PROTHROMBIN TIME: 23.8 SEC (ref 8.7–13.3)

## 2019-05-26 DIAGNOSIS — D68.9 COAGULOPATHY (HCC): ICD-10-CM

## 2019-05-28 NOTE — TELEPHONE ENCOUNTER
Date of last visit:  12/27/2018  Date of next visit:  Visit date not found    Requested Prescriptions     Pending Prescriptions Disp Refills    folic acid (FOLVITE) 1 MG tablet [Pharmacy Med Name: Folic Acid Oral Tablet 1 MG] 60 tablet 2     Sig: TAKE 1 TABLET BY MOUTH ONE TIME A DAY

## 2019-05-29 RX ORDER — FOLIC ACID 1 MG/1
TABLET ORAL
Qty: 60 TABLET | Refills: 2 | Status: SHIPPED | OUTPATIENT
Start: 2019-05-29 | End: 2019-12-01 | Stop reason: SDUPTHER

## 2019-06-06 NOTE — TELEPHONE ENCOUNTER
Date of last visit:  12/27/2018  Date of next visit:  Visit date not found    Requested Prescriptions     Pending Prescriptions Disp Refills    JANTOVEN 5 MG tablet [Pharmacy Med Name: Ngoc Kahn Oral Tablet 5 MG] 120 tablet 1     Sig: TAKE 1 TABLET BY MOUTH daily as directed by coumadin clinic (120=90 days)

## 2019-06-07 RX ORDER — WARFARIN SODIUM 5 MG/1
TABLET ORAL
Qty: 120 TABLET | Refills: 1 | Status: SHIPPED | OUTPATIENT
Start: 2019-06-07 | End: 2019-12-01 | Stop reason: SDUPTHER

## 2019-06-10 DIAGNOSIS — E78.00 PURE HYPERCHOLESTEROLEMIA: Chronic | ICD-10-CM

## 2019-06-10 NOTE — TELEPHONE ENCOUNTER
Date of last visit:  12/27/2018  Date of next visit:  Visit date not found    Requested Prescriptions     Pending Prescriptions Disp Refills    atorvastatin (LIPITOR) 80 MG tablet [Pharmacy Med Name: Atorvastatin Calcium Oral Tablet 80 MG] 90 tablet 1     Sig: TAKE 1 TABLET BY MOUTH ONE TIME A DAY

## 2019-06-11 RX ORDER — ATORVASTATIN CALCIUM 80 MG/1
TABLET, FILM COATED ORAL
Qty: 90 TABLET | Refills: 1 | Status: SHIPPED | OUTPATIENT
Start: 2019-06-11 | End: 2019-12-01 | Stop reason: SDUPTHER

## 2019-08-15 ENCOUNTER — OFFICE VISIT (OUTPATIENT)
Dept: FAMILY MEDICINE CLINIC | Age: 74
End: 2019-08-15

## 2019-08-15 VITALS
HEIGHT: 71 IN | BODY MASS INDEX: 32.76 KG/M2 | WEIGHT: 234 LBS | HEART RATE: 60 BPM | RESPIRATION RATE: 14 BRPM | SYSTOLIC BLOOD PRESSURE: 124 MMHG | DIASTOLIC BLOOD PRESSURE: 70 MMHG

## 2019-08-15 DIAGNOSIS — N20.0 KIDNEY STONE ON LEFT SIDE: ICD-10-CM

## 2019-08-15 DIAGNOSIS — I25.10 CORONARY ARTERY DISEASE INVOLVING NATIVE CORONARY ARTERY OF NATIVE HEART WITHOUT ANGINA PECTORIS: ICD-10-CM

## 2019-08-15 DIAGNOSIS — I27.82 OTHER CHRONIC PULMONARY EMBOLISM WITHOUT ACUTE COR PULMONALE (HCC): ICD-10-CM

## 2019-08-15 DIAGNOSIS — I25.10 CORONARY ARTERY DISEASE INVOLVING NATIVE CORONARY ARTERY OF NATIVE HEART, ANGINA PRESENCE UNSPECIFIED: ICD-10-CM

## 2019-08-15 DIAGNOSIS — K21.9 GASTROESOPHAGEAL REFLUX DISEASE WITHOUT ESOPHAGITIS: Primary | ICD-10-CM

## 2019-08-15 DIAGNOSIS — E78.00 PURE HYPERCHOLESTEROLEMIA: Chronic | ICD-10-CM

## 2019-08-15 DIAGNOSIS — G47.33 OBSTRUCTIVE SLEEP APNEA SYNDROME: ICD-10-CM

## 2019-08-15 DIAGNOSIS — I82.5Y9 CHRONIC DEEP VEIN THROMBOSIS (DVT) OF PROXIMAL VEIN OF LOWER EXTREMITY, UNSPECIFIED LATERALITY (HCC): ICD-10-CM

## 2019-08-15 DIAGNOSIS — D68.9 COAGULOPATHY (HCC): ICD-10-CM

## 2019-08-15 DIAGNOSIS — M54.50 CHRONIC MIDLINE LOW BACK PAIN WITHOUT SCIATICA: ICD-10-CM

## 2019-08-15 DIAGNOSIS — I50.32 CHRONIC DIASTOLIC CONGESTIVE HEART FAILURE (HCC): ICD-10-CM

## 2019-08-15 DIAGNOSIS — G89.29 CHRONIC MIDLINE LOW BACK PAIN WITHOUT SCIATICA: ICD-10-CM

## 2019-08-15 LAB — HGB, POC: 15

## 2019-08-15 PROCEDURE — 85018 HEMOGLOBIN: CPT | Performed by: FAMILY MEDICINE

## 2019-08-15 PROCEDURE — 99213 OFFICE O/P EST LOW 20 MIN: CPT | Performed by: FAMILY MEDICINE

## 2019-08-15 ASSESSMENT — ENCOUNTER SYMPTOMS
EYE PAIN: 0
TROUBLE SWALLOWING: 0
SHORTNESS OF BREATH: 0
BELCHING: 0
BLOOD IN STOOL: 0
CONSTIPATION: 0
SORE THROAT: 0
ABDOMINAL PAIN: 0
BACK PAIN: 0
CHEST TIGHTNESS: 0
COUGH: 0
NAUSEA: 0

## 2019-08-15 ASSESSMENT — PATIENT HEALTH QUESTIONNAIRE - PHQ9
SUM OF ALL RESPONSES TO PHQ QUESTIONS 1-9: 0
2. FEELING DOWN, DEPRESSED OR HOPELESS: 0
SUM OF ALL RESPONSES TO PHQ9 QUESTIONS 1 & 2: 0
1. LITTLE INTEREST OR PLEASURE IN DOING THINGS: 0
SUM OF ALL RESPONSES TO PHQ QUESTIONS 1-9: 0

## 2019-08-15 NOTE — PROGRESS NOTES
No components found for: CHLPL  Lab Results   Component Value Date    TRIG 124 02/26/2019     Lab Results   Component Value Date    HDL 48 02/26/2019     Lab Results   Component Value Date    LDLCALC 93 02/26/2019     No results found for: LABVLDL    Lab Results   Component Value Date    ALT 49 02/26/2019    AST 39 02/26/2019    ALKPHOS 84 02/26/2019    BILITOT 0.7 02/26/2019           Is patient currently taking any cholesterol medications? Yes   If yes, see med list as above    Is the patient reporting any side effects of cholesterol medications? No    Is the patient taking any over the counter medications? Yes   If yes, see med list as above    Is the patient taking a daily aspirin? Yes      Patient Self-Management Goal for Chronic Condition  Goal: I will take all medications as prescribed by my doctor, and I will call the office if I am having any medication problems. Barriers to success: none  Plan for overcoming my barriers: N/A     Confidence: 10/10  Date goal set: 8/15/19  Date goal attained:     Have you seen any other physician or provider since your last visit no    Have you had any other diagnostic tests since your last visit? no    Have you changed or stopped any medications since your last visit including any over-the-counter medicines, vitamins, or herbal medicines? no     Are you taking all your prescribed medications?  Yes    If NO, why?
embolism without acute cor pulmonale (HCC)     10. Pure hypercholesterolemia     11. Chronic diastolic congestive heart failure (HCC)           Plan:      Current Outpatient Medications   Medication Sig Dispense Refill    atorvastatin (LIPITOR) 80 MG tablet TAKE 1 TABLET BY MOUTH ONE TIME A DAY  90 tablet 1    JANTOVEN 5 MG tablet TAKE 1 TABLET BY MOUTH daily as directed by coumadin clinic (120=90 days) 444 tablet 1    folic acid (FOLVITE) 1 MG tablet TAKE 1 TABLET BY MOUTH ONE TIME A DAY  60 tablet 2    nitroGLYCERIN (NITROSTAT) 0.4 MG SL tablet up to max of 3 total doses. If no relief after 1 dose, call 911. 25 tablet 3    spironolactone-hydrochlorothiazide (ALDACTAZIDE) 25-25 MG per tablet Take 0.5 tablets by mouth daily 30 tablet 3    metoprolol succinate (TOPROL XL) 25 MG extended release tablet TAKE 1/2 TABLET BY MOUTH ONE TIME A DAY  45 tablet 1    potassium chloride (KLOR-CON M) 20 MEQ extended release tablet TAKE 1 TABLET BY MOUTH ONE TIME A DAY  90 tablet 3    pantoprazole (PROTONIX) 40 MG tablet TAKE 1 TABLET BY MOUTH ONE TIME A DAY  90 tablet 3    clotrimazole-betamethasone (LOTRISONE) 1-0.05 % cream Apply topically 2 times daily. Lotrisone 1-0.05% Cream 45 g 3    Ascorbic Acid (VITAMIN C) 1000 MG tablet Take 1,000 mg by mouth daily      Multiple Vitamins-Minerals (MULTI COMPLETE PO) Take 1 tablet by mouth daily      Omega-3 Fatty Acids (FISH OIL) 1000 MG CAPS Take 3,000 mg by mouth daily      aspirin 81 MG EC tablet Take 81 mg by mouth daily. No current facility-administered medications for this visit. Orders Placed This Encounter   Procedures    POCT hemoglobin       No results found for this visit on 08/15/19.         See in    4 mths   Jennifer Rebolledo MD

## 2019-08-20 DIAGNOSIS — I25.10 CORONARY ARTERY DISEASE INVOLVING NATIVE CORONARY ARTERY OF NATIVE HEART WITHOUT ANGINA PECTORIS: ICD-10-CM

## 2019-08-22 RX ORDER — METOPROLOL SUCCINATE 25 MG/1
TABLET, EXTENDED RELEASE ORAL
Qty: 45 TABLET | Refills: 0 | Status: SHIPPED | OUTPATIENT
Start: 2019-08-22 | End: 2019-12-01 | Stop reason: SDUPTHER

## 2019-09-23 ENCOUNTER — OFFICE VISIT (OUTPATIENT)
Dept: CARDIOLOGY CLINIC | Age: 74
End: 2019-09-23
Payer: MEDICARE

## 2019-09-23 VITALS
HEIGHT: 71 IN | DIASTOLIC BLOOD PRESSURE: 73 MMHG | HEART RATE: 59 BPM | BODY MASS INDEX: 32.76 KG/M2 | WEIGHT: 234 LBS | SYSTOLIC BLOOD PRESSURE: 122 MMHG

## 2019-09-23 DIAGNOSIS — R06.02 SOB (SHORTNESS OF BREATH) ON EXERTION: ICD-10-CM

## 2019-09-23 DIAGNOSIS — I50.32 CHRONIC DIASTOLIC CONGESTIVE HEART FAILURE (HCC): ICD-10-CM

## 2019-09-23 DIAGNOSIS — Z98.890 S/P CARDIAC CATH: ICD-10-CM

## 2019-09-23 DIAGNOSIS — E78.00 PURE HYPERCHOLESTEROLEMIA: Chronic | ICD-10-CM

## 2019-09-23 DIAGNOSIS — Z95.820 S/P ANGIOPLASTY WITH STENT: ICD-10-CM

## 2019-09-23 DIAGNOSIS — I25.10 CORONARY ARTERY DISEASE INVOLVING NATIVE CORONARY ARTERY OF NATIVE HEART WITHOUT ANGINA PECTORIS: Primary | ICD-10-CM

## 2019-09-23 PROBLEM — R94.39 ABNORMAL NUCLEAR STRESS TEST: Status: RESOLVED | Noted: 2019-04-12 | Resolved: 2019-09-23

## 2019-09-23 PROCEDURE — 99214 OFFICE O/P EST MOD 30 MIN: CPT | Performed by: INTERNAL MEDICINE

## 2019-09-23 NOTE — PROGRESS NOTES
Chief Complaint   Patient presents with    Check-Up    Congestive Heart Failure   Former pat of Dr. Brooklyn Bond  Hx of CAD s/p stent 8yrs back    Pt here for a 5 month f/u    Leg edema better after aldactazide    Sob on exertion - chronic    Denied Chest pain , palpitations or  dizziness    Lost 25 lb since  Last visit    EKG done on 11-      Patient Active Problem List   Diagnosis    CAD (coronary artery disease)    Kidney stone on left side    CAD (coronary artery disease)    DVT of proximal leg (deep vein thrombosis) ([facility] [address]    Diverticulosis of colon    Hyperlipidemia    Obesity    GERD (gastroesophageal reflux disease)    Colon polyps    Pulmonary embolus ([facility] [address]    Sleep apnea    Chronic back pain    Chronic diastolic congestive heart failure (HCC)    SOB (shortness of breath) on exertion    S/P cardiac cath 2014 at [facility]- patent LAD stent done 2006 and 40 to 50% lesions in other coronaries    S/P angioplasty with stent of the LAD 2014    S/P cardiac cath 4/17/19- large all ectatic vessel, lm-p, lad prox 40%, mid 50%, distal 40 distal stent -patent, lcx prox 40%, rca prox 30%, dist 40%, edp 11, ef 50%- med  RX       Past Surgical History:   Procedure Laterality Date    BACK SURGERY  5-    Dr Cong Cooper @ [facility])    [address]  2014       50%  lesion    bamdad    CARDIAC CATHETERIZATION  04/2019      dr Nava Patient  and  diffuse  disease  30 to 50%    COLONOSCOPY  2012      jean    CORONARY ANGIOPLASTY WITH STENT PLACEMENT  2006    LAD    EYE SURGERY  2011    bilateral    HERNIA REPAIR  88/53    umbilical    JOINT REPLACEMENT Right 04/2019     [place]   [place]  2016    placed  iin  1-2014  and  removed  5-2016       No Known Allergies     Family History   Problem Relation Age of Onset    Heart Disease Father         Social History     Socioeconomic History    Marital status:      Spouse name: Not on file    oriented, normal speech, no focal findings or movement disorder noted  Musculoskeletal/limbs - no joint tenderness, deformity or swelling   - peripheral pulses normal, no pedal edema, no clubbing or cyanosis  Skin - normal coloration and turgor, no rashes, no suspicious skin lesions noted  Psych- appropriate mood and affect    Lab  No results for input(s): CKTOTAL, CKMB, CKMBINDEX, TROPONINI in the last 72 hours.   CBC:   Lab Results   Component Value Date    WBC 7.2 04/17/2019    RBC 4.69 04/17/2019    RBC 5.10 08/22/2018    HGB 15.0 08/15/2019    HGB 14.5 04/17/2019    HCT 42.6 04/17/2019    MCV 90.8 04/17/2019    MCH 30.9 04/17/2019    MCHC 34.0 04/17/2019    RDW 13.1 08/22/2018     04/17/2019    MPV 10.2 04/17/2019     BMP:    Lab Results   Component Value Date     04/17/2019    K 4.1 04/17/2019     04/17/2019    CO2 23 04/17/2019    BUN 17 04/17/2019    LABALBU 4.2 02/26/2019    CREATININE 1.0 04/17/2019    CALCIUM 9.3 04/17/2019    LABGLOM 73 04/17/2019    GLUCOSE 130 04/17/2019    GLUCOSE 103 08/22/2018     Hepatic Function Panel:    Lab Results   Component Value Date    ALKPHOS 84 02/26/2019    ALT 49 02/26/2019    AST 39 02/26/2019    PROT 6.6 02/26/2019    BILITOT 0.7 02/26/2019    BILIDIR <0.2 02/26/2019    LABALBU 4.2 02/26/2019     Magnesium:    Lab Results   Component Value Date    MG 1.9 02/26/2019     Warfarin PT/INR:  No components found for: PTPATWAR, PTINRWAR  HgBA1c:  No results found for: LABA1C  FLP:    Lab Results   Component Value Date    TRIG 124 02/26/2019    HDL 48 02/26/2019    LDLCALC 93 02/26/2019     TSH:    Lab Results   Component Value Date    TSH 1.383 07/04/2012       EKG 12/19/18  NSR No acute ekg abn    Cardiac cath 02/2014   Mild to mod CAD 40 to 50% stenosis and LAD stent done 2006 was patent    Conclusions      Summary   Normal left ventricle size and Low Normal LV systolic function.   Ejection fraction was estimated at 50 to 55 %.   There were no regional

## 2019-11-20 ENCOUNTER — OFFICE VISIT (OUTPATIENT)
Dept: FAMILY MEDICINE CLINIC | Age: 74
End: 2019-11-20

## 2019-11-20 VITALS
RESPIRATION RATE: 14 BRPM | DIASTOLIC BLOOD PRESSURE: 64 MMHG | SYSTOLIC BLOOD PRESSURE: 102 MMHG | BODY MASS INDEX: 33.9 KG/M2 | HEART RATE: 64 BPM | HEIGHT: 71 IN | WEIGHT: 242.13 LBS

## 2019-11-20 DIAGNOSIS — Z23 NEED FOR INFLUENZA VACCINATION: ICD-10-CM

## 2019-11-20 DIAGNOSIS — N20.0 KIDNEY STONE ON LEFT SIDE: ICD-10-CM

## 2019-11-20 DIAGNOSIS — G47.33 OBSTRUCTIVE SLEEP APNEA SYNDROME: ICD-10-CM

## 2019-11-20 DIAGNOSIS — K21.9 GASTROESOPHAGEAL REFLUX DISEASE WITHOUT ESOPHAGITIS: ICD-10-CM

## 2019-11-20 DIAGNOSIS — E78.00 PURE HYPERCHOLESTEROLEMIA: Chronic | ICD-10-CM

## 2019-11-20 DIAGNOSIS — I25.10 CORONARY ARTERY DISEASE INVOLVING NATIVE CORONARY ARTERY OF NATIVE HEART, ANGINA PRESENCE UNSPECIFIED: ICD-10-CM

## 2019-11-20 DIAGNOSIS — I50.32 CHRONIC DIASTOLIC CONGESTIVE HEART FAILURE (HCC): ICD-10-CM

## 2019-11-20 DIAGNOSIS — N31.9 NEUROGENIC BLADDER: ICD-10-CM

## 2019-11-20 DIAGNOSIS — H90.3 SENSORINEURAL HEARING LOSS (SNHL), BILATERAL: ICD-10-CM

## 2019-11-20 DIAGNOSIS — I82.5Y9 CHRONIC DEEP VEIN THROMBOSIS (DVT) OF PROXIMAL VEIN OF LOWER EXTREMITY, UNSPECIFIED LATERALITY (HCC): Primary | ICD-10-CM

## 2019-11-20 DIAGNOSIS — I25.10 CORONARY ARTERY DISEASE INVOLVING NATIVE CORONARY ARTERY OF NATIVE HEART WITHOUT ANGINA PECTORIS: ICD-10-CM

## 2019-11-20 PROBLEM — Z98.890 S/P CARDIAC CATH: Status: RESOLVED | Noted: 2019-04-17 | Resolved: 2019-11-20

## 2019-11-20 PROBLEM — Z98.890 S/P CARDIAC CATH: Status: RESOLVED | Noted: 2019-04-12 | Resolved: 2019-11-20

## 2019-11-20 LAB
HEMOCCULT STL QL: NEGATIVE
HEMOCCULT STL QL: NORMAL
HEMOCCULT STL QL: NORMAL

## 2019-11-20 PROCEDURE — G0008 ADMIN INFLUENZA VIRUS VAC: HCPCS | Performed by: FAMILY MEDICINE

## 2019-11-20 PROCEDURE — 82270 OCCULT BLOOD FECES: CPT | Performed by: FAMILY MEDICINE

## 2019-11-20 PROCEDURE — 99214 OFFICE O/P EST MOD 30 MIN: CPT | Performed by: FAMILY MEDICINE

## 2019-11-20 PROCEDURE — 90756 CCIIV4 VACC ABX FREE IM: CPT | Performed by: FAMILY MEDICINE

## 2019-11-20 RX ORDER — TOLTERODINE 2 MG/1
2 CAPSULE, EXTENDED RELEASE ORAL DAILY
Qty: 30 CAPSULE | Refills: 5 | Status: SHIPPED | OUTPATIENT
Start: 2019-11-20 | End: 2020-10-27 | Stop reason: ALTCHOICE

## 2019-11-20 ASSESSMENT — ENCOUNTER SYMPTOMS
SORE THROAT: 0
BELCHING: 0
BACK PAIN: 0
COUGH: 0
EYE PAIN: 0
ABDOMINAL PAIN: 0
TROUBLE SWALLOWING: 0
BLOOD IN STOOL: 0
CONSTIPATION: 0
ORTHOPNEA: 0
NAUSEA: 0
SHORTNESS OF BREATH: 0
CHEST TIGHTNESS: 0

## 2019-12-01 DIAGNOSIS — D68.9 COAGULOPATHY (HCC): ICD-10-CM

## 2019-12-01 DIAGNOSIS — I25.10 CORONARY ARTERY DISEASE INVOLVING NATIVE CORONARY ARTERY OF NATIVE HEART WITHOUT ANGINA PECTORIS: ICD-10-CM

## 2019-12-01 DIAGNOSIS — E78.00 PURE HYPERCHOLESTEROLEMIA: Chronic | ICD-10-CM

## 2019-12-03 RX ORDER — METOPROLOL SUCCINATE 25 MG/1
TABLET, EXTENDED RELEASE ORAL
Qty: 45 TABLET | Refills: 0 | Status: SHIPPED | OUTPATIENT
Start: 2019-12-03 | End: 2020-03-31

## 2019-12-03 RX ORDER — FOLIC ACID 1 MG/1
TABLET ORAL
Qty: 60 TABLET | Refills: 1 | Status: SHIPPED | OUTPATIENT
Start: 2019-12-03 | End: 2020-03-31

## 2019-12-03 RX ORDER — ATORVASTATIN CALCIUM 80 MG/1
TABLET, FILM COATED ORAL
Qty: 90 TABLET | Refills: 0 | Status: SHIPPED | OUTPATIENT
Start: 2019-12-03 | End: 2020-04-02

## 2019-12-03 RX ORDER — WARFARIN SODIUM 5 MG/1
TABLET ORAL
Qty: 120 TABLET | Refills: 0 | Status: SHIPPED | OUTPATIENT
Start: 2019-12-03 | End: 2020-12-07

## 2019-12-20 ENCOUNTER — HOSPITAL ENCOUNTER (OUTPATIENT)
Dept: AUDIOLOGY | Age: 74
Discharge: HOME | End: 2019-12-20
Payer: MEDICARE

## 2019-12-20 PROCEDURE — 92567 TYMPANOMETRY: CPT | Performed by: AUDIOLOGIST

## 2019-12-20 PROCEDURE — 92557 COMPREHENSIVE HEARING TEST: CPT | Performed by: AUDIOLOGIST

## 2020-03-20 ENCOUNTER — OFFICE VISIT (OUTPATIENT)
Dept: FAMILY MEDICINE CLINIC | Age: 75
End: 2020-03-20

## 2020-03-20 VITALS
HEIGHT: 71 IN | RESPIRATION RATE: 14 BRPM | SYSTOLIC BLOOD PRESSURE: 124 MMHG | WEIGHT: 244 LBS | DIASTOLIC BLOOD PRESSURE: 74 MMHG | HEART RATE: 72 BPM | BODY MASS INDEX: 34.16 KG/M2

## 2020-03-20 PROCEDURE — 99213 OFFICE O/P EST LOW 20 MIN: CPT | Performed by: FAMILY MEDICINE

## 2020-03-20 ASSESSMENT — ENCOUNTER SYMPTOMS
ABDOMINAL PAIN: 0
COUGH: 0
SHORTNESS OF BREATH: 0
CONSTIPATION: 0
TROUBLE SWALLOWING: 0
NAUSEA: 0
EYE PAIN: 0
HOARSE VOICE: 0
BLOOD IN STOOL: 0
SORE THROAT: 0
BACK PAIN: 0
CHEST TIGHTNESS: 0

## 2020-03-20 ASSESSMENT — PATIENT HEALTH QUESTIONNAIRE - PHQ9
SUM OF ALL RESPONSES TO PHQ9 QUESTIONS 1 & 2: 0
SUM OF ALL RESPONSES TO PHQ QUESTIONS 1-9: 0
1. LITTLE INTEREST OR PLEASURE IN DOING THINGS: 0
2. FEELING DOWN, DEPRESSED OR HOPELESS: 0
SUM OF ALL RESPONSES TO PHQ QUESTIONS 1-9: 0

## 2020-03-20 NOTE — PROGRESS NOTES
Subjective:      Patient ID: Cortez Brush is a 67 y.o. male. Hx  Of  dvt  Stable         Hx  Of  pe  Stable       Chronic  Diastolic  chf  Stable      post  Right  Hip and back better      ashd stable       Gastroesophageal Reflux   He reports no abdominal pain, no chest pain, no coughing, no hoarse voice, no nausea or no sore throat. This is a chronic problem. The current episode started more than 1 year ago. The problem occurs rarely. The problem has been resolved. The symptoms are aggravated by certain foods. Pertinent negatives include no fatigue. He has tried a PPI for the symptoms. The treatment provided significant relief. Hypertension   This is a chronic problem. The current episode started more than 1 year ago. The problem has been resolved since onset. The problem is controlled. Pertinent negatives include no chest pain, headaches, neck pain, palpitations, peripheral edema or shortness of breath. There are no associated agents to hypertension. The current treatment provides significant improvement. There are no compliance problems. Past Medical History:   Diagnosis Date    CAD (coronary artery disease) 1/06    cath with stent lad    Chronic back pain 2013      epidural block    Colon polyps 10/05    Diverticulosis of colon     DVT of proximal leg (deep vein thrombosis) ([address] 12/04 2013    left     right  in 2013    GERD (gastroesophageal reflux disease)     Kidney stone on left side 11/09    Pulmonary embolus ([address] 6-2013 and  1-2015     had   right  leg  dvt    S[facility]- patent LAD stent done 2006 and 40 to 50% lesions in other coronaries 4/12/2019    S/P cardiac cath 4/17/19- large all ectatic vessel, lm-p, lad prox 40%, mid 50%, distal 40 distal stent -patent, lcx prox 40%, rca prox 30%, dist 40%, edp 11, ef 50%- med  RX 4/17/2019    Unspecified sleep apnea 2013    cpap     Review of Systems   Constitutional: Negative for fatigue and fever.    HENT: (VITAMIN C) 1000 MG tablet Take 1,000 mg by mouth daily      Multiple Vitamins-Minerals (MULTI COMPLETE PO) Take 1 tablet by mouth daily      Omega-3 Fatty Acids (FISH OIL) 1000 MG CAPS Take 3,000 mg by mouth daily      aspirin 81 MG EC tablet Take 81 mg by mouth daily. No current facility-administered medications for this visit. No orders of the defined types were placed in this encounter.     Orders Placed This Encounter   Procedures    TSH with Reflex     Standing Status:   Future     Standing Expiration Date:   3/20/2021    Lipid Panel     Standing Status:   Future     Standing Expiration Date:   3/20/2021     Order Specific Question:   Is Patient Fasting?/# of Hours     Answer:   YES 12 HOURS    Comprehensive Metabolic Panel     Standing Status:   Future     Standing Expiration Date:   3/20/2021    CBC Auto Differential     Standing Status:   Future     Standing Expiration Date:   3/20/2021          see  In  4 mths     Mamadou Burton MD

## 2020-03-30 NOTE — TELEPHONE ENCOUNTER
Date of last visit:  3/20/2020  Date of next visit:  7/21/2020    Requested Prescriptions     Pending Prescriptions Disp Refills    folic acid (FOLVITE) 1 MG tablet [Pharmacy Med Name: Folic Acid Oral Tablet 1 MG] 60 tablet 0     Sig: TAKE 1 TABLET BY MOUTH ONE TIME A DAY. [address].     metoprolol succinate (TOPROL XL) 25 MG extended release tablet [Pharmacy Med Name: Metoprolol Succinate ER Oral Tablet Extended Release 24 Hour 25 MG] 45 tablet 0     Sig: TAKE 1/2 TABLET BY MOUTH ONE TIME A DAY

## 2020-03-31 RX ORDER — FOLIC ACID 1 MG/1
TABLET ORAL
Qty: 60 TABLET | Refills: 5 | Status: SHIPPED | OUTPATIENT
Start: 2020-03-31 | End: 2021-03-30

## 2020-03-31 RX ORDER — METOPROLOL SUCCINATE 25 MG/1
TABLET, EXTENDED RELEASE ORAL
Qty: 45 TABLET | Refills: 3 | Status: SHIPPED | OUTPATIENT
Start: 2020-03-31 | End: 2021-03-30

## 2020-04-01 RX ORDER — SPIRONOLACTONE AND HYDROCHLOROTHIAZIDE 25; 25 MG/1; MG/1
TABLET ORAL
Qty: 30 TABLET | Refills: 1 | Status: SHIPPED | OUTPATIENT
Start: 2020-04-01 | End: 2020-06-08 | Stop reason: SDUPTHER

## 2020-04-02 ENCOUNTER — NURSE ONLY (OUTPATIENT)
Dept: LAB | Age: 75
End: 2020-04-02

## 2020-04-02 LAB
ALBUMIN SERPL-MCNC: 3.9 G/DL (ref 3.5–5.1)
ALP BLD-CCNC: 74 U/L (ref 38–126)
ALT SERPL-CCNC: 37 U/L (ref 11–66)
ANION GAP SERPL CALCULATED.3IONS-SCNC: 11 MEQ/L (ref 8–16)
AST SERPL-CCNC: 32 U/L (ref 5–40)
BASOPHILS # BLD: 1 %
BASOPHILS ABSOLUTE: 0.1 THOU/MM3 (ref 0–0.1)
BILIRUB SERPL-MCNC: 0.8 MG/DL (ref 0.3–1.2)
BUN BLDV-MCNC: 18 MG/DL (ref 7–22)
CALCIUM SERPL-MCNC: 9.5 MG/DL (ref 8.5–10.5)
CHLORIDE BLD-SCNC: 102 MEQ/L (ref 98–111)
CHOLESTEROL, TOTAL: 159 MG/DL (ref 100–199)
CO2: 26 MEQ/L (ref 23–33)
CREAT SERPL-MCNC: 0.7 MG/DL (ref 0.4–1.2)
EOSINOPHIL # BLD: 4 %
EOSINOPHILS ABSOLUTE: 0.3 THOU/MM3 (ref 0–0.4)
ERYTHROCYTE [DISTWIDTH] IN BLOOD BY AUTOMATED COUNT: 13 % (ref 11.5–14.5)
ERYTHROCYTE [DISTWIDTH] IN BLOOD BY AUTOMATED COUNT: 44.5 FL (ref 35–45)
GFR SERPL CREATININE-BSD FRML MDRD: > 90 ML/MIN/1.73M2
GLUCOSE BLD-MCNC: 113 MG/DL (ref 70–108)
HCT VFR BLD CALC: 45.8 % (ref 42–52)
HDLC SERPL-MCNC: 46 MG/DL
HEMOGLOBIN: 15.1 GM/DL (ref 14–18)
IMMATURE GRANS (ABS): 0.02 THOU/MM3 (ref 0–0.07)
IMMATURE GRANULOCYTES: 0.3 %
LDL CHOLESTEROL CALCULATED: 88 MG/DL
LYMPHOCYTES # BLD: 25.3 %
LYMPHOCYTES ABSOLUTE: 1.6 THOU/MM3 (ref 1–4.8)
MAGNESIUM: 2.1 MG/DL (ref 1.6–2.4)
MCH RBC QN AUTO: 30.9 PG (ref 26–33)
MCHC RBC AUTO-ENTMCNC: 33 GM/DL (ref 32.2–35.5)
MCV RBC AUTO: 93.7 FL (ref 80–94)
MONOCYTES # BLD: 14.1 %
MONOCYTES ABSOLUTE: 0.9 THOU/MM3 (ref 0.4–1.3)
NUCLEATED RED BLOOD CELLS: 0 /100 WBC
PLATELET # BLD: 179 THOU/MM3 (ref 130–400)
PMV BLD AUTO: 10.6 FL (ref 9.4–12.4)
POTASSIUM SERPL-SCNC: 4.2 MEQ/L (ref 3.5–5.2)
RBC # BLD: 4.89 MILL/MM3 (ref 4.7–6.1)
SEG NEUTROPHILS: 55.3 %
SEGMENTED NEUTROPHILS ABSOLUTE COUNT: 3.5 THOU/MM3 (ref 1.8–7.7)
SODIUM BLD-SCNC: 139 MEQ/L (ref 135–145)
TOTAL PROTEIN: 6.9 G/DL (ref 6.1–8)
TRIGL SERPL-MCNC: 125 MG/DL (ref 0–199)
TSH SERPL DL<=0.05 MIU/L-ACNC: 2.38 UIU/ML (ref 0.4–4.2)
WBC # BLD: 6.3 THOU/MM3 (ref 4.8–10.8)

## 2020-04-02 RX ORDER — ATORVASTATIN CALCIUM 80 MG/1
TABLET, FILM COATED ORAL
Qty: 90 TABLET | Refills: 1 | Status: SHIPPED | OUTPATIENT
Start: 2020-04-02 | End: 2020-10-06

## 2020-04-02 RX ORDER — PANTOPRAZOLE SODIUM 40 MG/1
TABLET, DELAYED RELEASE ORAL
Qty: 90 TABLET | Refills: 1 | Status: SHIPPED | OUTPATIENT
Start: 2020-04-02 | End: 2020-10-06

## 2020-04-02 RX ORDER — POTASSIUM CHLORIDE 20 MEQ/1
TABLET, EXTENDED RELEASE ORAL
Qty: 90 TABLET | Refills: 1 | Status: SHIPPED | OUTPATIENT
Start: 2020-04-02 | End: 2021-01-12

## 2020-04-06 ENCOUNTER — TELEPHONE (OUTPATIENT)
Dept: FAMILY MEDICINE CLINIC | Age: 75
End: 2020-04-06

## 2020-06-08 ENCOUNTER — OFFICE VISIT (OUTPATIENT)
Dept: CARDIOLOGY CLINIC | Age: 75
End: 2020-06-08
Payer: MEDICARE

## 2020-06-08 VITALS
SYSTOLIC BLOOD PRESSURE: 130 MMHG | WEIGHT: 230.6 LBS | DIASTOLIC BLOOD PRESSURE: 70 MMHG | HEIGHT: 70 IN | HEART RATE: 81 BPM | BODY MASS INDEX: 33.01 KG/M2

## 2020-06-08 PROBLEM — R06.02 SOB (SHORTNESS OF BREATH) ON EXERTION: Status: ACTIVE | Noted: 2020-06-08

## 2020-06-08 PROCEDURE — 99214 OFFICE O/P EST MOD 30 MIN: CPT | Performed by: INTERNAL MEDICINE

## 2020-06-08 PROCEDURE — 93000 ELECTROCARDIOGRAM COMPLETE: CPT | Performed by: INTERNAL MEDICINE

## 2020-06-08 RX ORDER — SPIRONOLACTONE AND HYDROCHLOROTHIAZIDE 25; 25 MG/1; MG/1
0.5 TABLET ORAL DAILY
Qty: 45 TABLET | Refills: 3 | Status: SHIPPED | OUTPATIENT
Start: 2020-06-08 | End: 2021-06-21 | Stop reason: SDUPTHER

## 2020-06-22 ENCOUNTER — TELEPHONE (OUTPATIENT)
Dept: CARDIOLOGY CLINIC | Age: 75
End: 2020-06-22

## 2020-06-22 NOTE — TELEPHONE ENCOUNTER
Pre op Risk Assessment    Procedure Oral Surgery  Physician Dr. Mu Navas  Date of surgery/procedure TBD    Last OV 6-8-20  Last Stress 4-11-19  Last Echo 2-4-19  Last Cath 4-17-19  Last Stent None In Epic  Is patient on blood thinners ASA  Hold Meds/how many days ?     Fax- [phone]

## 2020-06-29 NOTE — TELEPHONE ENCOUNTER
Patient LM on nurse line returning our call. Called patient back. LM for patient to call office back.

## 2020-07-21 ENCOUNTER — OFFICE VISIT (OUTPATIENT)
Dept: FAMILY MEDICINE CLINIC | Age: 75
End: 2020-07-21

## 2020-07-21 VITALS
WEIGHT: 228.38 LBS | SYSTOLIC BLOOD PRESSURE: 108 MMHG | TEMPERATURE: 97.5 F | BODY MASS INDEX: 32.69 KG/M2 | DIASTOLIC BLOOD PRESSURE: 64 MMHG | HEART RATE: 56 BPM | HEIGHT: 70 IN | RESPIRATION RATE: 16 BRPM

## 2020-07-21 PROCEDURE — G0439 PPPS, SUBSEQ VISIT: HCPCS | Performed by: FAMILY MEDICINE

## 2020-07-21 PROCEDURE — 99213 OFFICE O/P EST LOW 20 MIN: CPT | Performed by: FAMILY MEDICINE

## 2020-07-21 ASSESSMENT — ENCOUNTER SYMPTOMS
BACK PAIN: 0
TROUBLE SWALLOWING: 0
SORE THROAT: 0
NAUSEA: 0
SHORTNESS OF BREATH: 0
CONSTIPATION: 0
EYE PAIN: 0
ABDOMINAL PAIN: 0
COUGH: 0
CHEST TIGHTNESS: 0
BLOOD IN STOOL: 0

## 2020-07-21 ASSESSMENT — LIFESTYLE VARIABLES
HAVE YOU OR SOMEONE ELSE BEEN INJURED AS A RESULT OF YOUR DRINKING: 0
HOW OFTEN DURING THE LAST YEAR HAVE YOU FOUND THAT YOU WERE NOT ABLE TO STOP DRINKING ONCE YOU HAD STARTED: 0
HOW OFTEN DURING THE LAST YEAR HAVE YOU BEEN UNABLE TO REMEMBER WHAT HAPPENED THE NIGHT BEFORE BECAUSE YOU HAD BEEN DRINKING: 0
HOW MANY STANDARD DRINKS CONTAINING ALCOHOL DO YOU HAVE ON A TYPICAL DAY: 0
HOW OFTEN DURING THE LAST YEAR HAVE YOU FAILED TO DO WHAT WAS NORMALLY EXPECTED FROM YOU BECAUSE OF DRINKING: 0
HOW OFTEN DURING THE LAST YEAR HAVE YOU NEEDED AN ALCOHOLIC DRINK FIRST THING IN THE MORNING TO GET YOURSELF GOING AFTER A NIGHT OF HEAVY DRINKING: 0
AUDIT-C TOTAL SCORE: 2
HAS A RELATIVE, FRIEND, DOCTOR, OR ANOTHER HEALTH PROFESSIONAL EXPRESSED CONCERN ABOUT YOUR DRINKING OR SUGGESTED YOU CUT DOWN: 0
HOW OFTEN DO YOU HAVE A DRINK CONTAINING ALCOHOL: 2
HOW OFTEN DO YOU HAVE SIX OR MORE DRINKS ON ONE OCCASION: 0
AUDIT TOTAL SCORE: 2
HOW OFTEN DURING THE LAST YEAR HAVE YOU HAD A FEELING OF GUILT OR REMORSE AFTER DRINKING: 0

## 2020-07-21 ASSESSMENT — PATIENT HEALTH QUESTIONNAIRE - PHQ9
SUM OF ALL RESPONSES TO PHQ QUESTIONS 1-9: 0
SUM OF ALL RESPONSES TO PHQ QUESTIONS 1-9: 0

## 2020-07-21 NOTE — PROGRESS NOTES
Medicare Annual Wellness Visit  Name: Merna Robison [id number] Date: 2020   MRN: [id number] Sex: Male   Age: 67 y.o. Ethnicity: Non-/Non    : 1945 Race: Phani Franco is here for [facility] and [employer] for behavioral, psychosocial and functional/safety risks, and cognitive dysfunction are all negative except as indicated below. These results, as well as other patient data from the [address] form, are documented in Flowsheets linked to this Encounter. Has  Will and  Durable power   Of health      Colonoscopy  Needed   No Known Allergies    Prior to Visit Medications    Medication Sig Taking? Authorizing Provider   spironolactone-hydrochlorothiazide (ALDACTAZIDE) 25-25 MG per tablet Take 0.5 tablets by mouth daily Take 0.5mg daily Yes Ok Anen MD   pantoprazole (PROTONIX) 40 MG tablet TAKE 1 TABLET BY MOUTH ONE TIME A DAY  Yes Tamanna Duke MD   potassium chloride (KLOR-CON M) 20 MEQ extended release tablet TAKE 1 TABLET BY MOUTH ONE TIME A DAY  Yes Tamanna Duke MD   atorvastatin (LIPITOR) 80 MG tablet TAKE 1 TABLET BY MOUTH ONE TIME A DAY  Yes Tamanna Duke MD   folic acid (FOLVITE) 1 MG tablet TAKE 1 TABLET BY MOUTH ONE TIME A DAY. [address]. Yes Tamanna Duke MD   metoprolol succinate (TOPROL XL) 25 MG extended release tablet TAKE 1/2 TABLET BY MOUTH ONE TIME A DAY  Yes Tamanna Duke MD   JANTOVEN 5 MG tablet TAKE 1 TABLET BY MOUTH ONE TIME A DAY AS DIRECTED by coumadin clinic Yes Tamanna Duke MD   tolterodine (DETROL LA) 2 MG extended release capsule Take 1 capsule by mouth daily Yes Tamanna Duke MD   nitroGLYCERIN (NITROSTAT) 0.4 MG SL tablet up to max of 3 total doses. If no relief after 1 dose, call 911. Yes Ok Anne MD   clotrimazole-betamethasone (LOTRISONE) 1-0.05 % cream Apply topically 2 times daily.  Lotrisone 1-0.05% Cream Yes Tamanna Duke MD your stairways have a railing or banister?: Yes  Are your doorways, halls and stairs free of clutter?: (!) No  Do you always fasten your seatbelt when you are in a car?: (!) No  Safety Interventions:  · Home safety tips provided  Safety  Issues and  Use  Seatbelt     Personalized Preventive Plan   Current Health Maintenance Status  Immunization History   Administered Date(s) Administered    Influenza 10/22/2013    Influenza Virus Vaccine 2001, 10/06/2015    Influenza, MDCK Quadv, with preserv IM (Flucelvax 4 yrs and older) 2019    Influenza, Carlos, 6 mo and older, IM (Fluzone, Flulaval) 2017    Influenza, Quadv, IM, (6 mo and older Fluzone, Flulaval, Fluarix and 3 yrs and older Afluria) 2016    Pneumococcal Conjugate 13-valent ([id number]) 10/06/2015    Pneumococcal Polysaccharide ([id number]) 2011        Health Maintenance   Topic Date Due    DTaP/Tdap/Td vaccine (1 - Tdap) 1963    Shingles Vaccine (1 of 2) 1994    PSA counseling  2018    Annual Wellness Visit (AWV)  2019    Flu vaccine (1) 2020    Lipid screen  2021    Potassium monitoring  2021    Creatinine monitoring  2021    Pneumococcal 65+ years Vaccine  Completed    Hepatitis A vaccine  Aged Out    Hepatitis B vaccine  Aged Out    Hib vaccine  Aged Out    Meningococcal (ACWY) vaccine  Aged Out     Recommendations for [employer] Due: see orders and patient instructions/AVS.  . Recommended screening schedule for the next 5-10 years is provided to the patient in written form: see Patient Instructions/AVS.    There are no diagnoses linked to this encounter.              2020     Himanshu Franco (:  1945) is a 67 y.o. male, here for evaluation of the following medical concerns:      Sleep apnea and use  Of  Dr Mcclure Ask       colon  Polyps       Colonoscopy  Noted   Sheik       right  Hip  Better   Post  Replacement and  ;lyumbar  Disc and Better      back  Better      dvt in  Past       gerd  stable   Chronic  chf  Stable     Review of Systems   Constitutional: Negative for fatigue and fever. HENT: Negative for congestion, ear pain, postnasal drip, sore throat and trouble swallowing. Eyes: Negative for pain. Respiratory: Negative for cough, chest tightness and shortness of breath. Cardiovascular: Negative for chest pain, palpitations and leg swelling. Gastrointestinal: Negative for abdominal pain, blood in stool, constipation and nausea. Genitourinary: Negative for difficulty urinating, frequency and urgency. Musculoskeletal: Negative for arthralgias, back pain, joint swelling and neck stiffness. Skin: Negative for rash. Neurological: Negative for dizziness, weakness and headaches. Hematological: Negative for adenopathy. Does not bruise/bleed easily. Psychiatric/Behavioral: Negative for behavioral problems, dysphoric mood and sleep disturbance. Prior to Visit Medications    Medication Sig Taking? Authorizing Provider   spironolactone-hydrochlorothiazide (ALDACTAZIDE) 25-25 MG per tablet Take 0.5 tablets by mouth daily Take 0.5mg daily Yes Debo Morley MD   pantoprazole (PROTONIX) 40 MG tablet TAKE 1 TABLET BY MOUTH ONE TIME A DAY  Yes Az Vitale MD   potassium chloride (KLOR-CON M) 20 MEQ extended release tablet TAKE 1 TABLET BY MOUTH ONE TIME A DAY  Yes Az Vitale MD   atorvastatin (LIPITOR) 80 MG tablet TAKE 1 TABLET BY MOUTH ONE TIME A DAY  Yes Az Vitale MD   folic acid (FOLVITE) 1 MG tablet TAKE 1 TABLET BY MOUTH ONE TIME A DAY. [address].  Yes Az Vitale MD   metoprolol succinate (TOPROL XL) 25 MG extended release tablet TAKE 1/2 TABLET BY MOUTH ONE TIME A DAY  Yes Az Vitale MD   JANTOVEN 5 MG tablet TAKE 1 TABLET BY MOUTH ONE TIME A DAY AS DIRECTED by coumadin clinic Yes Az Vitale MD   tolterodine (DETROL LA) 2 MG extended release capsule Take 1 capsule by mouth daily Yes Fidelina Barillas MD   nitroGLYCERIN (NITROSTAT) 0.4 MG SL tablet up to max of 3 total doses. If no relief after 1 dose, call 911. Yes Lory Fay MD   clotrimazole-betamethasone (LOTRISONE) 1-0.05 % cream Apply topically 2 times daily. Lotrisone 1-0.05% Cream Yes Fidelina Barillas MD   Ascorbic Acid (VITAMIN C) 1000 MG tablet Take 1,000 mg by mouth daily Yes Historical Provider, MD   Multiple Vitamins-Minerals (MULTI COMPLETE PO) Take 1 tablet by mouth daily Yes Historical Provider, MD   Omega-3 Fatty Acids (FISH OIL) 1000 MG CAPS Take 3,000 mg by mouth daily Yes Historical Provider, MD   aspirin 81 MG EC tablet Take 81 mg by mouth daily. Yes Historical Provider, MD        Social History     Tobacco Use    Smoking status: Never Smoker    Smokeless tobacco: Never Used   Substance Use Topics    Alcohol use: Yes     Alcohol/week: 3.3 standard drinks     Types: 4 Standard drinks or equivalent per week        Vitals:    07/21/20 0840   BP: 108/64   Site: Right Upper Arm   Position: Sitting   Cuff Size: Large Adult   Pulse: 56   Resp: 16   Temp: 97.5 °F (36.4 °C)   TempSrc: Skin   Weight: 228 lb 6 oz (103.6 kg)   Height: 5' 10\" (1.778 m)     Estimated body mass index is 32.77 kg/m² as calculated from the following:    Height as of this encounter: 5' 10\" (1.778 m). Weight as of this encounter: 228 lb 6 oz (103.6 kg). Physical Exam  Vitals signs and nursing note reviewed. Constitutional:       Appearance: He is well-developed. HENT:      Head: Normocephalic and atraumatic. Right Ear: External ear normal.      Left Ear: External ear normal.      Nose: Nose normal.   Eyes:      Conjunctiva/sclera: Conjunctivae normal.      Pupils: Pupils are equal, round, and reactive to light. Comments: Fundi nl   Neck:      Musculoskeletal: Normal range of motion and neck supple. Thyroid: No thyromegaly. Cardiovascular:      Rate and Rhythm: Normal rate and regular rhythm. Heart sounds: Murmur (1/6 ana) present. Pulmonary:      Effort: Pulmonary effort is normal.      Breath sounds: Normal breath sounds. No wheezing or rales. Abdominal:      General: Bowel sounds are normal.      Palpations: Abdomen is soft. There is no mass. Tenderness: There is no abdominal tenderness. Musculoskeletal: Normal range of motion. Lymphadenopathy:      Cervical: No cervical adenopathy. Skin:     General: Skin is warm and dry. Findings: No rash. Neurological:      Mental Status: He is alert and oriented to person, place, and time. Cranial Nerves: No cranial nerve deficit. Deep Tendon Reflexes: Reflexes are normal and symmetric. ASSESSMENT/PLAN:  /   Diagnosis Orders   1. Coronary artery disease involving native coronary artery of native heart without angina pectoris     2. Polyp of colon, unspecified part of colon, unspecified type  [facility] - Allison Galeano MD, Gastroenterology, [place]   3. Chronic diastolic congestive heart failure ([facility] [address]     4. Chronic deep vein thrombosis (DVT) of proximal vein of lower extremity, unspecified laterality (HCC)     5. Other chronic pulmonary embolism without acute cor pulmonale (HCC)     6. Obstructive sleep apnea syndrome     7. Pure hypercholesterolemia     8.  Class 1 obesity without serious comorbidity with body mass index (BMI) of 34.0 to 34.9 in adult, unspecified obesity type           PLAn        Current Outpatient Medications   Medication Sig Dispense Refill    spironolactone-hydrochlorothiazide (ALDACTAZIDE) 25-25 MG per tablet Take 0.5 tablets by mouth daily Take 0.5mg daily 45 tablet 3    pantoprazole (PROTONIX) 40 MG tablet TAKE 1 TABLET BY MOUTH ONE TIME A DAY  90 tablet 1    potassium chloride (KLOR-CON M) 20 MEQ extended release tablet TAKE 1 TABLET BY MOUTH ONE TIME A DAY  90 tablet 1    atorvastatin (LIPITOR) 80 MG tablet TAKE 1 TABLET BY MOUTH ONE TIME A DAY  90 tablet 1    folic acid (FOLVITE) 1 MG tablet TAKE 1 TABLET BY MOUTH ONE TIME A DAY. [address]. 60 tablet 5    metoprolol succinate (TOPROL XL) 25 MG extended release tablet TAKE 1/2 TABLET BY MOUTH ONE TIME A DAY  45 tablet 3    JANTOVEN 5 MG tablet TAKE 1 TABLET BY MOUTH ONE TIME A DAY AS DIRECTED by coumadin clinic 120 tablet 0    tolterodine (DETROL LA) 2 MG extended release capsule Take 1 capsule by mouth daily 30 capsule 5    nitroGLYCERIN (NITROSTAT) 0.4 MG SL tablet up to max of 3 total doses. If no relief after 1 dose, call 911. 25 tablet 3    clotrimazole-betamethasone (LOTRISONE) 1-0.05 % cream Apply topically 2 times daily. Lotrisone 1-0.05% Cream 45 g 3    Ascorbic Acid (VITAMIN C) 1000 MG tablet Take 1,000 mg by mouth daily      Multiple Vitamins-Minerals (MULTI COMPLETE PO) Take 1 tablet by mouth daily      Omega-3 Fatty Acids (FISH OIL) 1000 MG CAPS Take 3,000 mg by mouth daily      aspirin 81 MG EC tablet Take 81 mg by mouth daily. No current facility-administered medications for this visit. Orders Placed This Encounter   Procedures   Ronak Arboleda MD, Gastroenterology, [place]     Referral Priority:   Routine     Referral Type:   Eval and Treat     Referral Reason:   Specialty Services Required     Referred to Provider:   Tia Juarez MD     Requested Specialty:   Gastroenterology     Number of Visits Requested:   1         See in  3 [facility] dn  Stable       There are no diagnoses linked to this encounter. No follow-ups on file. An electronic signature was used to authenticate this note.     --Jigar Nunez MD on 7/21/2020 at 9:16 AM

## 2020-07-21 NOTE — PATIENT INSTRUCTIONS
Personalized Preventive Plan for Kylah Franco - 7/21/2020  Medicare offers a range of preventive health benefits. Some of the tests and screenings are paid in full while other may be subject to a deductible, co-insurance, and/or copay. Some of these benefits include a comprehensive review of your medical history including lifestyle, illnesses that may run in your family, and various assessments and screenings as appropriate. After reviewing your medical record and screening and assessments performed today your provider may have ordered immunizations, labs, imaging, and/or referrals for you. A list of these orders (if applicable) as well as your Preventive Care list are included within your After Visit Summary for your review. Other Preventive Recommendations:    · A preventive eye exam performed by an eye specialist is recommended every 1-2 years to screen for glaucoma; cataracts, macular degeneration, and other eye disorders. · A preventive dental visit is recommended every 6 months. · Try to get at least 150 minutes of exercise per week or 10,000 steps per day on a pedometer . · Order or download the FREE \"Exercise & Physical Activity: Your Everyday Guide\" from [employer]. Call 6-687.210.4558 or search [employer] online. · You need 5270-1409 mg of calcium and 9183-2776 IU of vitamin D per day. It is possible to meet your calcium requirement with diet alone, but a vitamin D supplement is usually necessary to meet this goal.  · When exposed to the sun, use a sunscreen that protects against both UVA and UVB radiation with an SPF of 30 or greater. Reapply every 2 to 3 hours or after sweating, drying off with a towel, or swimming. · Always wear a seat belt when traveling in a car. Always wear a helmet when riding a bicycle or motorcycle.

## 2020-09-24 ENCOUNTER — TELEPHONE (OUTPATIENT)
Dept: FAMILY MEDICINE CLINIC | Age: 75
End: 2020-09-24

## 2020-10-05 NOTE — TELEPHONE ENCOUNTER
Date of last visit:  7/21/2020  Date of next visit:  10/27/2020    Requested Prescriptions     Pending Prescriptions Disp Refills    pantoprazole (PROTONIX) 40 MG tablet [Pharmacy Med Name: Pantoprazole Sodium Oral Tablet Delayed Release 40 MG] 90 tablet 0     Sig: TAKE 1 TABLET BY MOUTH ONE TIME A DAY    atorvastatin (LIPITOR) 80 MG tablet [Pharmacy Med Name: Atorvastatin Calcium Oral Tablet 80 MG] 90 tablet 0     Sig: TAKE 1 TABLET BY MOUTH ONE TIME A DAY

## 2020-10-06 RX ORDER — PANTOPRAZOLE SODIUM 40 MG/1
TABLET, DELAYED RELEASE ORAL
Qty: 90 TABLET | Refills: 0 | Status: SHIPPED | OUTPATIENT
Start: 2020-10-06 | End: 2021-01-05

## 2020-10-06 RX ORDER — ATORVASTATIN CALCIUM 80 MG/1
TABLET, FILM COATED ORAL
Qty: 90 TABLET | Refills: 0 | Status: SHIPPED | OUTPATIENT
Start: 2020-10-06 | End: 2021-01-05

## 2020-10-27 ENCOUNTER — OFFICE VISIT (OUTPATIENT)
Dept: FAMILY MEDICINE CLINIC | Age: 75
End: 2020-10-27

## 2020-10-27 VITALS
WEIGHT: 229.5 LBS | BODY MASS INDEX: 32.86 KG/M2 | SYSTOLIC BLOOD PRESSURE: 126 MMHG | HEART RATE: 72 BPM | TEMPERATURE: 97.8 F | HEIGHT: 70 IN | RESPIRATION RATE: 16 BRPM | DIASTOLIC BLOOD PRESSURE: 68 MMHG

## 2020-10-27 PROBLEM — R06.02 SOB (SHORTNESS OF BREATH) ON EXERTION: Status: RESOLVED | Noted: 2020-06-08 | Resolved: 2020-10-27

## 2020-10-27 PROBLEM — I10 ESSENTIAL HYPERTENSION: Status: ACTIVE | Noted: 2020-10-27

## 2020-10-27 PROCEDURE — 90688 IIV4 VACCINE SPLT 0.5 ML IM: CPT | Performed by: FAMILY MEDICINE

## 2020-10-27 PROCEDURE — 99213 OFFICE O/P EST LOW 20 MIN: CPT | Performed by: FAMILY MEDICINE

## 2020-10-27 PROCEDURE — G0008 ADMIN INFLUENZA VIRUS VAC: HCPCS | Performed by: FAMILY MEDICINE

## 2020-10-27 RX ORDER — CLOTRIMAZOLE AND BETAMETHASONE DIPROPIONATE 10; .64 MG/G; MG/G
CREAM TOPICAL
Qty: 45 G | Refills: 3 | Status: SHIPPED | OUTPATIENT
Start: 2020-10-27

## 2020-10-27 ASSESSMENT — ENCOUNTER SYMPTOMS
CHEST TIGHTNESS: 0
BACK PAIN: 0
BLOOD IN STOOL: 0
CONSTIPATION: 0
EYE PAIN: 0
HEARTBURN: 0
COUGH: 0
TROUBLE SWALLOWING: 0
GLOBUS SENSATION: 0
ABDOMINAL PAIN: 0
SORE THROAT: 0
SHORTNESS OF BREATH: 0
NAUSEA: 0

## 2020-10-27 NOTE — PROGRESS NOTES
Immunizations Administered     Name Date Dose Route    Influenza, Quadv, IM, (6 mo and older Fluzone, Flulaval, Fluarix and 3 yrs and older Afluria) 10/27/2020 0.5 mL Intramuscular    Site: Deltoid- Left    Lot: [id number]    NDC: 66319-174-24
No components found for: CHLPL  Lab Results   Component Value Date    TRIG 125 04/02/2020     Lab Results   Component Value Date    HDL 46 04/02/2020     Lab Results   Component Value Date    LDLCALC 88 04/02/2020     No results found for: LABVLDL    Lab Results   Component Value Date    ALT 37 04/02/2020    AST 32 04/02/2020    ALKPHOS 74 04/02/2020    BILITOT 0.8 04/02/2020           Is patient currently taking any cholesterol medications? Yes   If yes, see med list as above    Is the patient reporting any side effects of cholesterol medications? No    Is the patient taking any over the counter medications? Yes   If yes, see med list as above    Is the patient taking a daily aspirin? Yes      Patient Self-Management Goal for Chronic Condition  Goal: I will take all medications as prescribed by my doctor, and I will call the office if I am having any medication problems. Barriers to success: none  Plan for overcoming my barriers: N/A     Confidence: 10/10  Date goal set: 10/27/20  Date goal attained:     Have you seen any other physician or provider since your last visit no    Have you had any other diagnostic tests since your last visit? no    Have you changed or stopped any medications since your last visit including any over-the-counter medicines, vitamins, or herbal medicines? no     Are you taking all your prescribed medications?  Yes    If NO, why?
He is alert and oriented to person, place, and time. Cranial Nerves: No cranial nerve deficit. Deep Tendon Reflexes: Reflexes are normal and symmetric. Assessment:       Diagnosis Orders   1. Chronic diastolic congestive heart failure ([address]     2. Need for influenza vaccination  INFLUENZA, QUADV, 0.5ML, 6 MO AND OLDER, IM, MDV, (Mitzi Moya)   3. Dermatitis  clotrimazole-betamethasone (LOTRISONE) 1-0.05 % cream   4. Eczema, unspecified type  clotrimazole-betamethasone (LOTRISONE) 1-0.05 % cream   5. Essential hypertension     6. Coronary artery disease involving native coronary artery of native heart without angina pectoris     7. Gastroesophageal reflux disease without esophagitis     8. History of pulmonary embolus (PE)           Plan:      Current Outpatient Medications   Medication Sig Dispense Refill    pantoprazole (PROTONIX) 40 MG tablet TAKE 1 TABLET BY MOUTH ONE TIME A DAY  90 tablet 0    atorvastatin (LIPITOR) 80 MG tablet TAKE 1 TABLET BY MOUTH ONE TIME A DAY  90 tablet 0    spironolactone-hydrochlorothiazide (ALDACTAZIDE) 25-25 MG per tablet Take 0.5 tablets by mouth daily Take 0.5mg daily 45 tablet 3    potassium chloride (KLOR-CON M) 20 MEQ extended release tablet TAKE 1 TABLET BY MOUTH ONE TIME A DAY  90 tablet 1    folic acid (FOLVITE) 1 MG tablet TAKE 1 TABLET BY MOUTH ONE TIME A DAY. [address]. 60 tablet 5    metoprolol succinate (TOPROL XL) 25 MG extended release tablet TAKE 1/2 TABLET BY MOUTH ONE TIME A DAY  45 tablet 3    JANTOVEN 5 MG tablet TAKE 1 TABLET BY MOUTH ONE TIME A DAY AS DIRECTED by coumadin clinic 120 tablet 0    nitroGLYCERIN (NITROSTAT) 0.4 MG SL tablet up to max of 3 total doses. If no relief after 1 dose, call 911. 25 tablet 3    clotrimazole-betamethasone (LOTRISONE) 1-0.05 % cream Apply topically 2 times daily.  Lotrisone 1-0.05% Cream 45 g 3    Ascorbic Acid (VITAMIN C) 1000 MG tablet Take 1,000 mg by mouth daily      Multiple

## 2020-12-06 ENCOUNTER — HOSPITAL ENCOUNTER (EMERGENCY)
Age: 75
Discharge: HOME | End: 2020-12-06
Attending: EMERGENCY MEDICINE
Payer: MEDICARE

## 2020-12-06 ENCOUNTER — APPOINTMENT (OUTPATIENT)
Dept: CT IMAGING | Age: 75
End: 2020-12-06
Payer: MEDICARE

## 2020-12-06 VITALS
HEART RATE: 71 BPM | BODY MASS INDEX: 31.5 KG/M2 | SYSTOLIC BLOOD PRESSURE: 115 MMHG | HEIGHT: 70 IN | WEIGHT: 220 LBS | TEMPERATURE: 98.5 F | OXYGEN SATURATION: 96 % | DIASTOLIC BLOOD PRESSURE: 68 MMHG | RESPIRATION RATE: 18 BRPM

## 2020-12-06 LAB
ALBUMIN SERPL-MCNC: 4 G/DL (ref 3.5–5.1)
ALP BLD-CCNC: 74 U/L (ref 38–126)
ALT SERPL-CCNC: 28 U/L (ref 11–66)
ANION GAP SERPL CALCULATED.3IONS-SCNC: 10 MEQ/L (ref 8–16)
AST SERPL-CCNC: 33 U/L (ref 5–40)
BACTERIA: ABNORMAL /HPF
BASOPHILS # BLD: 0.5 %
BASOPHILS ABSOLUTE: 0 THOU/MM3 (ref 0–0.1)
BILIRUB SERPL-MCNC: 0.8 MG/DL (ref 0.3–1.2)
BILIRUBIN DIRECT: < 0.2 MG/DL (ref 0–0.3)
BILIRUBIN URINE: NEGATIVE
BLOOD, URINE: ABNORMAL
BUN BLDV-MCNC: 18 MG/DL (ref 7–22)
CALCIUM SERPL-MCNC: 9.8 MG/DL (ref 8.5–10.5)
CASTS 2: ABNORMAL /LPF
CASTS UA: ABNORMAL /LPF
CHARACTER, URINE: CLEAR
CHLORIDE BLD-SCNC: 103 MEQ/L (ref 98–111)
CO2: 26 MEQ/L (ref 23–33)
COLOR: ABNORMAL
CREAT SERPL-MCNC: 0.9 MG/DL (ref 0.4–1.2)
CRYSTALS, UA: ABNORMAL
EOSINOPHIL # BLD: 1.3 %
EOSINOPHILS ABSOLUTE: 0.1 THOU/MM3 (ref 0–0.4)
EPITHELIAL CELLS, UA: ABNORMAL /HPF
ERYTHROCYTE [DISTWIDTH] IN BLOOD BY AUTOMATED COUNT: 12.8 % (ref 11.5–14.5)
ERYTHROCYTE [DISTWIDTH] IN BLOOD BY AUTOMATED COUNT: 43.7 FL (ref 35–45)
GFR SERPL CREATININE-BSD FRML MDRD: 82 ML/MIN/1.73M2
GLUCOSE BLD-MCNC: 137 MG/DL (ref 70–108)
GLUCOSE URINE: NEGATIVE MG/DL
HCT VFR BLD CALC: 44.5 % (ref 42–52)
HEMOGLOBIN: 15.2 GM/DL (ref 14–18)
IMMATURE GRANS (ABS): 0.04 THOU/MM3 (ref 0–0.07)
IMMATURE GRANULOCYTES: 0.4 %
INR BLD: 2.14 (ref 0.85–1.13)
KETONES, URINE: NEGATIVE
LEUKOCYTE ESTERASE, URINE: ABNORMAL
LIPASE: 29.4 U/L (ref 5.6–51.3)
LYMPHOCYTES # BLD: 10 %
LYMPHOCYTES ABSOLUTE: 0.9 THOU/MM3 (ref 1–4.8)
MCH RBC QN AUTO: 31.9 PG (ref 26–33)
MCHC RBC AUTO-ENTMCNC: 34.2 GM/DL (ref 32.2–35.5)
MCV RBC AUTO: 93.3 FL (ref 80–94)
MISCELLANEOUS 2: ABNORMAL
MONOCYTES # BLD: 9.8 %
MONOCYTES ABSOLUTE: 0.9 THOU/MM3 (ref 0.4–1.3)
NITRITE, URINE: NEGATIVE
NUCLEATED RED BLOOD CELLS: 0 /100 WBC
OSMOLALITY CALCULATION: 281.6 MOSMOL/KG (ref 275–300)
PH UA: 5 (ref 5–9)
PLATELET # BLD: 171 THOU/MM3 (ref 130–400)
PMV BLD AUTO: 10.2 FL (ref 9.4–12.4)
POTASSIUM SERPL-SCNC: 4 MEQ/L (ref 3.5–5.2)
PROTEIN UA: NEGATIVE
RBC # BLD: 4.77 MILL/MM3 (ref 4.7–6.1)
RBC URINE: ABNORMAL /HPF
RENAL EPITHELIAL, UA: ABNORMAL
SEG NEUTROPHILS: 78 %
SEGMENTED NEUTROPHILS ABSOLUTE COUNT: 7.3 THOU/MM3 (ref 1.8–7.7)
SODIUM BLD-SCNC: 139 MEQ/L (ref 135–145)
SPECIFIC GRAVITY, URINE: 1.02 (ref 1–1.03)
TOTAL PROTEIN: 6.1 G/DL (ref 6.1–8)
TROPONIN T: < 0.01 NG/ML
UROBILINOGEN, URINE: 0.2 EU/DL (ref 0–1)
WBC # BLD: 9.4 THOU/MM3 (ref 4.8–10.8)
WBC UA: ABNORMAL /HPF
YEAST: ABNORMAL

## 2020-12-06 PROCEDURE — 96375 TX/PRO/DX INJ NEW DRUG ADDON: CPT

## 2020-12-06 PROCEDURE — 85025 COMPLETE CBC W/AUTO DIFF WBC: CPT

## 2020-12-06 PROCEDURE — 81001 URINALYSIS AUTO W/SCOPE: CPT

## 2020-12-06 PROCEDURE — 2580000003 HC RX 258: Performed by: EMERGENCY MEDICINE

## 2020-12-06 PROCEDURE — 36415 COLL VENOUS BLD VENIPUNCTURE: CPT

## 2020-12-06 PROCEDURE — 96361 HYDRATE IV INFUSION ADD-ON: CPT

## 2020-12-06 PROCEDURE — 6360000002 HC RX W HCPCS: Performed by: EMERGENCY MEDICINE

## 2020-12-06 PROCEDURE — 85610 PROTHROMBIN TIME: CPT

## 2020-12-06 PROCEDURE — 74176 CT ABD & PELVIS W/O CONTRAST: CPT

## 2020-12-06 PROCEDURE — 99285 EMERGENCY DEPT VISIT HI MDM: CPT

## 2020-12-06 PROCEDURE — 84484 ASSAY OF TROPONIN QUANT: CPT

## 2020-12-06 PROCEDURE — 80053 COMPREHEN METABOLIC PANEL: CPT

## 2020-12-06 PROCEDURE — 96374 THER/PROPH/DIAG INJ IV PUSH: CPT

## 2020-12-06 PROCEDURE — 83690 ASSAY OF LIPASE: CPT

## 2020-12-06 PROCEDURE — 82248 BILIRUBIN DIRECT: CPT

## 2020-12-06 RX ORDER — SODIUM CHLORIDE 9 MG/ML
INJECTION, SOLUTION INTRAVENOUS CONTINUOUS
Status: DISCONTINUED | OUTPATIENT
Start: 2020-12-06 | End: 2020-12-06 | Stop reason: HOSPADM

## 2020-12-06 RX ORDER — ONDANSETRON 2 MG/ML
4 INJECTION INTRAMUSCULAR; INTRAVENOUS ONCE
Status: COMPLETED | OUTPATIENT
Start: 2020-12-06 | End: 2020-12-06

## 2020-12-06 RX ORDER — 0.9 % SODIUM CHLORIDE 0.9 %
500 INTRAVENOUS SOLUTION INTRAVENOUS ONCE
Status: COMPLETED | OUTPATIENT
Start: 2020-12-06 | End: 2020-12-06

## 2020-12-06 RX ORDER — ONDANSETRON 4 MG/1
TABLET, FILM COATED ORAL
Qty: 12 TABLET | Refills: 0 | Status: SHIPPED | OUTPATIENT
Start: 2020-12-06 | End: 2022-02-11 | Stop reason: ALTCHOICE

## 2020-12-06 RX ORDER — KETOROLAC TROMETHAMINE 30 MG/ML
30 INJECTION, SOLUTION INTRAMUSCULAR; INTRAVENOUS ONCE
Status: COMPLETED | OUTPATIENT
Start: 2020-12-06 | End: 2020-12-06

## 2020-12-06 RX ORDER — OXYCODONE HYDROCHLORIDE AND ACETAMINOPHEN 5; 325 MG/1; MG/1
1 TABLET ORAL EVERY 6 HOURS PRN
Qty: 12 TABLET | Refills: 0 | Status: SHIPPED | OUTPATIENT
Start: 2020-12-06 | End: 2020-12-09

## 2020-12-06 RX ADMIN — KETOROLAC TROMETHAMINE 30 MG: 30 INJECTION, SOLUTION INTRAMUSCULAR; INTRAVENOUS at 18:09

## 2020-12-06 RX ADMIN — SODIUM CHLORIDE 500 ML: 9 INJECTION, SOLUTION INTRAVENOUS at 18:09

## 2020-12-06 RX ADMIN — ONDANSETRON 4 MG: 2 INJECTION INTRAMUSCULAR; INTRAVENOUS at 18:09

## 2020-12-06 ASSESSMENT — PAIN DESCRIPTION - LOCATION: LOCATION: FLANK

## 2020-12-06 ASSESSMENT — ENCOUNTER SYMPTOMS
TROUBLE SWALLOWING: 0
RHINORRHEA: 0
SHORTNESS OF BREATH: 0
SORE THROAT: 0
VOMITING: 0
COUGH: 0
WHEEZING: 0
CHEST TIGHTNESS: 0
VOICE CHANGE: 0
CONSTIPATION: 0
NAUSEA: 1
DIARRHEA: 0
BACK PAIN: 0
SINUS PRESSURE: 0
ABDOMINAL PAIN: 0

## 2020-12-06 ASSESSMENT — PAIN DESCRIPTION - ORIENTATION: ORIENTATION: LEFT

## 2020-12-06 ASSESSMENT — PAIN DESCRIPTION - ONSET: ONSET: ON-GOING

## 2020-12-06 ASSESSMENT — PAIN DESCRIPTION - PAIN TYPE: TYPE: ACUTE PAIN

## 2020-12-06 ASSESSMENT — PAIN SCALES - GENERAL
PAINLEVEL_OUTOF10: 9
PAINLEVEL_OUTOF10: 5
PAINLEVEL_OUTOF10: 9

## 2020-12-06 ASSESSMENT — PAIN DESCRIPTION - DESCRIPTORS: DESCRIPTORS: SHARP

## 2020-12-06 ASSESSMENT — PAIN DESCRIPTION - PROGRESSION: CLINICAL_PROGRESSION: GRADUALLY WORSENING

## 2020-12-06 ASSESSMENT — PAIN DESCRIPTION - FREQUENCY: FREQUENCY: CONTINUOUS

## 2020-12-06 NOTE — ED TRIAGE NOTES
Patient arrived to room 11 with c/o left side flank pain. Patient stated this started today around 1:30pm. Patient stated he has hx of kidney stones the past. Patient stated he is unsure if this pain feels the same. Patient stated the pain is getting worse and he is unable to get in a comfortable position.

## 2020-12-06 NOTE — ED PROVIDER NOTES
indicated that his father is . He indicated that his sister is alive. family history includes Heart Disease in his father. SOCIAL HISTORY     reports that he has never smoked. He has never used smokeless tobacco. He reports current alcohol use of about 3.3 standard drinks of alcohol per week. He reports that he does not use drugs. PHYSICAL EXAM      INITIAL VITALS: /68   Pulse 71   Temp 98.5 °F (36.9 °C) (Oral)   Resp 18   Ht 5' 10\" (1.778 m)   Wt 220 lb (99.8 kg)   SpO2 96%   BMI 31.57 kg/m² Estimated body mass index is 31.57 kg/m² as calculated from the following:    Height as of this encounter: 5' 10\" (1.778 m). Weight as of this encounter: 220 lb (99.8 kg). Physical Exam  Vitals signs reviewed. Constitutional:       Appearance: He is well-developed. HENT:      Head: Normocephalic and atraumatic. Right Ear: External ear normal.      Left Ear: External ear normal.      Nose: Nose normal.   Eyes:      General: No scleral icterus. Conjunctiva/sclera: Conjunctivae normal.      Pupils: Pupils are equal, round, and reactive to light. Neck:      Musculoskeletal: Normal range of motion and neck supple. Thyroid: No thyromegaly. Vascular: No JVD. Cardiovascular:      Rate and Rhythm: Normal rate and regular rhythm. Heart sounds: No murmur. No friction rub. Pulmonary:      Effort: Pulmonary effort is normal.      Breath sounds: Normal breath sounds. No wheezing or rales. Chest:      Chest wall: No tenderness. Abdominal:      General: Bowel sounds are normal.      Palpations: Abdomen is soft. There is no mass. Tenderness: There is no abdominal tenderness. There is left CVA tenderness. Lymphadenopathy:      Cervical: No cervical adenopathy. Skin:     Findings: No rash. Neurological:      Mental Status: He is alert and oriented to person, place, and time. Psychiatric:         Behavior: Behavior is cooperative.          MEDICAL DECISION MAKING    DIFFERENTIAL DIAGNOSIS:  Renal colic, kidney stone, UTI pyelonephritis      DIAGNOSTIC RESULTS      RADIOLOGY:  I have reviewed radiologic plain film image(s). The plain films will be read or overread by the radiologist.All other non-plain film images(s) such as CT, Ultrasound and MRI have been read by the radiologist.  CT ABDOMEN PELVIS WO CONTRAST Additional Contrast? None   Final Result   1. There is a punctate nonobstructing calculus within the midpole of the right renal pelvis. There is no pelvocaliectasis. There is no right hydroureter or right ureteral calculus. 2. There are a few punctate nonobstructing calculi within the left renal pelvis. There is also an obstructing 0.4 cm calculus within the distal left ureter, just proximal to the left UVJ with mild left pelvocaliectasis and moderate left hydroureter    proximal to this. There are also stranding densities suggesting edema/inflammation within the left perinephric space and also extending inferiorly within the posterior left pararenal space. Minimal stranding density is also seen along the course of the    left ureter. 3. Numerous additional findings as described in the body the report. **This report has been created using voice recognition software. It may contain minor errors which are inherent in voice recognition technology. **      Final report electronically signed by Dr. Saskia Hickman on 12/6/2020 6:16 PM          LABS:   Labs Reviewed   CBC WITH AUTO DIFFERENTIAL - Abnormal; Notable for the following components:       Result Value    Lymphocytes Absolute 0.9 (*)     All other components within normal limits   BASIC METABOLIC PANEL - Abnormal; Notable for the following components:    Glucose 137 (*)     All other components within normal limits   PROTIME-INR - Abnormal; Notable for the following components:    INR 2.14 (*)     All other components within normal limits   URINE WITH REFLEXED MICRO - Abnormal; Notable for the following components:    Blood, Urine TRACE (*)     Leukocyte Esterase, Urine SMALL (*)     Color, UA DK YELLOW (*)     All other components within normal limits   GLOMERULAR FILTRATION RATE, ESTIMATED - Abnormal; Notable for the following components:    Est, Glom Filt Rate 82 (*)     All other components within normal limits   HEPATIC FUNCTION PANEL   LIPASE   TROPONIN   ANION GAP   OSMOLALITY     All other unresulted laboratory test above are normal:    Vitals:    Vitals:    12/06/20 1559 12/06/20 1807 12/06/20 1912   BP: (!) 148/92 120/70 115/68   Pulse: 72 75 71   Resp: 16 16 18   Temp: 98.5 °F (36.9 °C)     TempSrc: Oral     SpO2: 96% 97% 96%   Weight: 220 lb (99.8 kg)     Height: 5' 10\" (1.778 m)         EMERGENCY DEPARTMENT COURSE:    Medications   0.9 % sodium chloride infusion (has no administration in time range)   0.9 % sodium chloride bolus (500 mLs Intravenous New Bag 12/6/20 1809)   ondansetron (ZOFRAN) injection 4 mg (4 mg Intravenous Given 12/6/20 1809)   ketorolac (TORADOL) injection 30 mg (30 mg Intravenous Given 12/6/20 1809)       The pt was seen and evaluated by me. Within the department, I observed the pt's vitalsigns to be within acceptable range. Laboratory and Radiological studies were performed, results were reviewed with the patient. Within the department, the pt was treated with IV fluid hydration, Zofran and Toradol. When reevaluated, the patient's pain resolved and feeling well. I observed the pt's condition to be hemodynamically stable during the duration of their stay. I explained my proposed course of treatment to the pt, and they were amenable to my decision. They were discharged home, and they will return to the ED if their symptoms become more severein nature, or otherwise change. Controlled Substances Monitoring:   Periodic Controlled Substance Monitoring: No signs of potential drug abuse or diversion identified.  So Cardenas MD)    CRITICAL CARE: None.    CONSULTS:  None    PROCEDURES:  None. FINAL IMPRESSION       1. Renal colic on left side    2. Ureterolithiasis          DISPOSITION/PLAN  PATIENT REFERRED TO:  Bony Byrne MD  [place] [id number]  [phone]    Schedule an appointment as soon as possible for a visit in 2 days      [facility] [address] [address] EMERGENCY DEPT  [address]    As needed    DISCHARGE MEDICATIONS:  New Prescriptions    ONDANSETRON (ZOFRAN) 4 MG TABLET    1 tablet every 6-8 hours for nausea vomiting    OXYCODONE-ACETAMINOPHEN (PERCOCET) 5-325 MG PER TABLET    Take 1 tablet by mouth every 6 hours as needed for Pain for up to 3 days. Intended supply: 3 days. Take lowest dose possible to manage pain         (Please note that portions of this note were completed with a voice recognition program and electronically transcribed. Efforts were [facility] edit the dictations but occasionally words are mis-transcribed . The transcription may contain errors not detected in proofreading.   This transcription was electronically signed.)     12/06/20 8:14 PM      Dex Mcknight MD      Emergency room physician           Dex Mcknight MD  12/06/20 2014

## 2020-12-07 ENCOUNTER — CARE COORDINATION (OUTPATIENT)
Dept: FAMILY MEDICINE CLINIC | Age: 75
End: 2020-12-07

## 2020-12-07 RX ORDER — WARFARIN SODIUM 5 MG/1
TABLET ORAL
Qty: 30 TABLET | Refills: 0 | Status: SHIPPED | OUTPATIENT
Start: 2020-12-07 | End: 2022-06-28

## 2020-12-07 RX ORDER — WARFARIN SODIUM 5 MG/1
TABLET ORAL
Qty: 120 TABLET | Refills: 0 | Status: SHIPPED | OUTPATIENT
Start: 2020-12-07 | End: 2020-12-07 | Stop reason: SDUPTHER

## 2020-12-07 NOTE — TELEPHONE ENCOUNTER
Pt called stating that he is completely out and needing to  today. He has been out since Friday.     Send to [facility]

## 2020-12-07 NOTE — CARE COORDINATION
Ambulatory Care Coordination  ED Follow up Call    Reason for ED visit: Lt Flank Pain   Status:     improved    Did you call your PCP prior to going to the ED? No      Did you receive a discharge instructions from the Emergency Room? Yes  Review of Instructions:     Understands what to report/when to return?:  Yes   Understands discharge instructions?:  Yes   Following discharge instructions?:  Yes   If not why? Are there any new complaints of pain? No  New Pain Meds? Yes    Constipation prophylaxis needed? N/A    If you have a wound is the dressing clean, dry, and intact? N/A  Understands wound care regimen? N/A    Are there any other complaints/concerns that you wish to tell your provider? FU appts/Provider:    Future Appointments   Date Time Provider Ron Govea   12/14/2020 11:00 AM Estelita Bang MD [facility] [address]   1/27/2021  8:40 AM Supriya Parikh MD [facility]           New Medications?:   Yes      Medication Reconciliation by phone - Yes  Understands Medications? Yes  Taking Medications? Yes  Can you swallow your pills? Yes    Any further needs in the home i.e. Equipment? No    Link to services in community?:  N/A   Which services:    He is feeling much better. He has a script for pain medication but he has not taken any. He is making a follow up appointment and has no additional needs at home.

## 2020-12-07 NOTE — TELEPHONE ENCOUNTER
Per verbal order of dr. Claire David, refilled the prescription for patient. Sent refill to pharmacy, left message on phone to pt.

## 2020-12-07 NOTE — ED NOTES
Upon first contact with patient this RN receives bedside shift report from [facility]. Pt resting on cot with unlabored respirations. Pt updated on POC.         Geoff Matthews [id number]  12/06/20 0673

## 2020-12-07 NOTE — TELEPHONE ENCOUNTER
Date of last visit:  10/27/2020  Date of next visit:  1/27/2021    Requested Prescriptions     Pending Prescriptions Disp Refills    JANTOVEN 5 MG tablet [Pharmacy Med Name: Francine Davis Oral Tablet 5 MG] 120 tablet 0     Sig: TAKE 1 TABLET BY MOUTH ONE TIME A DAY AS DIRECTED BY COUMADIN CLINIC

## 2020-12-07 NOTE — ED NOTES
ED nurse-to-nurse bedside report    Chief Complaint   Patient presents with    Flank Pain     left       LOC: alert and orientated to name, place, date  Vital signs   Vitals:    12/06/20 1559 12/06/20 1807   BP: (!) 148/92 120/70   Pulse: 72 75   Resp: 16 16   Temp: 98.5 °F (36.9 °C)    TempSrc: Oral    SpO2: 96% 97%   Weight: 220 lb (99.8 kg)    Height: 5' 10\" (1.778 m)       Pain: 9  Pain Interventions: toradol   Pain Goal: 6   Oxygen: No    Current needs required none    Telemetry: No  LDAs:   Peripheral IV 12/06/20 Left Forearm (Active)   Site Assessment Clean;Dry; Intact 12/06/20 1808   Line Status Flushed;Normal saline locked 12/06/20 1808   Dressing Status Dry;Clean; Intact 12/06/20 1808   Dressing Intervention New 12/06/20 1808     Continuous Infusions:    sodium chloride       Mobility: Independent  [place] Fall Risk Score:    Fall Risk 7/21/2020 3/20/2020 12/27/2018 11/21/2017 5/2/2017 5/2/2017 1/20/2016   2 or more falls in past year? no no no no no no no   Fall with injury in past year? no no no no no no no     Fall Interventions: call light at bedside, fall precautions discussed   Report given to: Sterling Contreras RN  12/06/20 [id number]

## 2020-12-08 RX ORDER — WARFARIN SODIUM 5 MG/1
TABLET ORAL
Qty: 120 TABLET | Refills: 1 | Status: SHIPPED | OUTPATIENT
Start: 2020-12-08 | End: 2021-04-29 | Stop reason: SDUPTHER

## 2020-12-14 ENCOUNTER — OFFICE VISIT (OUTPATIENT)
Dept: CARDIOLOGY CLINIC | Age: 75
End: 2020-12-14
Payer: MEDICARE

## 2020-12-14 VITALS
DIASTOLIC BLOOD PRESSURE: 77 MMHG | HEIGHT: 70 IN | SYSTOLIC BLOOD PRESSURE: 127 MMHG | WEIGHT: 237.5 LBS | BODY MASS INDEX: 34 KG/M2 | HEART RATE: 65 BPM

## 2020-12-14 PROCEDURE — 99214 OFFICE O/P EST MOD 30 MIN: CPT | Performed by: INTERNAL MEDICINE

## 2020-12-14 NOTE — PROGRESS NOTES
Chief Complaint   Patient presents with    6 Month Follow-Up    Coronary Artery Disease   Former pat of Dr. Karlo Collins  Hx of CAD s/p stent 8yrs back      Pt. Is  Here for a 6 month follow up.      Leg edema better after aldactazide    Sob on exertion - chronic-unchanged    Denied Chest pain , palpitations or  dizziness    Gained 7 lb since  Last visit    Patient Active Problem List   Diagnosis    CAD (coronary artery disease)    DVT of proximal leg (deep vein thrombosis) (HCC)    Diverticulosis of colon    Hyperlipidemia    Obesity    GERD (gastroesophageal reflux disease)    Colon polyps    Pulmonary embolus ([address]    Sleep apnea    Chronic back pain    Chronic diastolic congestive heart failure ([address]    S/P cardiac cath 2014 at [facility]- patent LAD stent done 2006 and 40 to 50% lesions in other coronaries    S/P angioplasty with stent of the LAD 2014    Essential hypertension       Past Surgical History:   Procedure Laterality Date    BACK SURGERY  5-    Dr Thalia Mccallum @ [facility])    [address]  2014       50%  lesion    bamdad    CARDIAC CATHETERIZATION  04/2019      dr Sudheer Luna  and  diffuse  disease  30 to 50%    COLONOSCOPY  2012 2020    sheik    polyps   due   2025    CORONARY ANGIOPLASTY WITH STENT PLACEMENT  2006    LAD    EYE SURGERY  2011    bilateral    HERNIA REPAIR  71/32    umbilical    JOINT REPLACEMENT Right 04/2019     [place]  right  hip    VENA CAVA FILTER PLACEMENT  2016    placed  iin  1-2014  and  removed  5-2016       No Known Allergies     Family History   Problem Relation Age of Onset    Heart Disease Father         Social History     Socioeconomic History    Marital status:      Spouse name: Not on file    Number of children: Not on file    Years of education: Not on file    Highest education level: Not on file   Occupational History    Not on file   Social Needs    Financial resource strain: Not on file   [place] insecurity     Worry: Not on file     Inability: Not on file    Transportation needs     Medical: Not on file     Non-medical: Not on file   Tobacco Use    Smoking status: Never Smoker    Smokeless tobacco: Never Used   Substance and Sexual Activity    Alcohol use: Yes     Alcohol/week: 3.3 standard drinks     Types: 4 Standard drinks or equivalent per week    Drug use: No    Sexual activity: Yes     Partners: Female   Lifestyle    Physical activity     Days per week: Not on file     Minutes per session: Not on file    Stress: Not on file   Relationships    Social connections     Talks on phone: Not on file     Gets together: Not on file     Attends Anabaptist service: Not on file     Active member of club or organization: Not on file     Attends meetings of clubs or organizations: Not on file     Relationship status: Not on file    Intimate partner violence     Fear of current or ex partner: Not on file     Emotionally abused: Not on file     Physically abused: Not on file     Forced sexual activity: Not on file   Other Topics Concern    Not on file   Social History Narrative    Not on file       Current Outpatient Medications   Medication Sig Dispense Refill    warfarin (JANTOVEN) 5 MG tablet TAKE 1 TABLET BY MOUTH ONE TIME A DAY AS DIRECTED BY COUMADIN CLINIC 120 tablet 1    warfarin (JANTOVEN) 5 MG tablet TAKE 1 TABLET BY MOUTH ONE TIME A DAY AS DIRECTED BY COUMADIN CLINIC 30 tablet 0    ondansetron (ZOFRAN) 4 MG tablet 1 tablet every 6-8 hours for nausea vomiting 12 tablet 0    clotrimazole-betamethasone (LOTRISONE) 1-0.05 % cream Apply topically 2 times daily.  Lotrisone 1-0.05% Cream 45 g 3    pantoprazole (PROTONIX) 40 MG tablet TAKE 1 TABLET BY MOUTH ONE TIME A DAY  90 tablet 0    atorvastatin (LIPITOR) 80 MG tablet TAKE 1 TABLET BY MOUTH ONE TIME A DAY  90 tablet 0    spironolactone-hydrochlorothiazide (ALDACTAZIDE) 25-25 MG per tablet Take 0.5 tablets by mouth daily Take 0.5mg daily 45 suprapubic tenderness  Neurological - alert, oriented, normal speech, no focal findings or movement disorder noted  Musculoskeletal/limbs - no joint tenderness, deformity or swelling   - peripheral pulses normal, no pedal edema, no clubbing or cyanosis  Skin - normal coloration and turgor, no rashes, no suspicious skin lesions noted  Psych- appropriate mood and affect    Lab  No results for input(s): CKTOTAL, CKMB, CKMBINDEX, TROPONINI in the last 72 hours.   CBC:   Lab Results   Component Value Date    WBC 9.4 12/06/2020    RBC 4.77 12/06/2020    RBC 5.10 08/22/2018    HGB 15.2 12/06/2020    HCT 44.5 12/06/2020    MCV 93.3 12/06/2020    MCH 31.9 12/06/2020    MCHC 34.2 12/06/2020    RDW 13.1 08/22/2018     12/06/2020    MPV 10.2 12/06/2020     BMP:    Lab Results   Component Value Date     12/06/2020    K 4.0 12/06/2020    K 4.1 04/17/2019     12/06/2020    CO2 26 12/06/2020    BUN 18 12/06/2020    LABALBU 4.0 12/06/2020    CREATININE 0.9 12/06/2020    CALCIUM 9.8 12/06/2020    LABGLOM 82 12/06/2020    GLUCOSE 137 12/06/2020    GLUCOSE 103 08/22/2018     Hepatic Function Panel:    Lab Results   Component Value Date    ALKPHOS 74 12/06/2020    ALT 28 12/06/2020    AST 33 12/06/2020    PROT 6.1 12/06/2020    BILITOT 0.8 12/06/2020    BILIDIR <0.2 12/06/2020    LABALBU 4.0 12/06/2020     Magnesium:    Lab Results   Component Value Date    MG 2.1 04/02/2020     Warfarin PT/INR:  No components found for: PTPATWAR, PTINRWAR  HgBA1c:  No results found for: LABA1C  FLP:    Lab Results   Component Value Date    TRIG 125 04/02/2020    HDL 46 04/02/2020    LDLCALC 88 04/02/2020     TSH:    Lab Results   Component Value Date    TSH 2.380 04/02/2020       EKG 12/19/18  NSR No acute ekg abn    Cardiac cath 02/2014   Mild to mod CAD 40 to 50% stenosis and LAD stent done 2006 was patent    Conclusions      Summary   Normal left ventricle size and Low Normal LV systolic function.   Ejection fraction was estimated at 48 to 55 %.   There were no regional left ventricular wall motion abnormalities and wall   thickness was within normal limits.   Doppler parameters were consistent with abnormal left ventricular   relaxation (grade 1 diastolic dysfunction).   The left atrium is Mildly dilated.   Mildly enlarged right atrium size.   Mildly enlarged right ventricle cavity.   Right ventricle global systolic function is normal .      Signature      ----------------------------------------------------------------   Electronically signed by Debbie Mccann MD (Interpreting   physician) on 02/04/2019 at 06:02 PM    Conclusions      Summary   No ischemic EKG changes.   Nonspecific ST-T wave changes.   The T.I.D. ratio was 1.44 . ( Visually noted as well)   The nuclear images is suggestive for mild myocardial ischemia in the   inferior wall.      Recommendation   Clinical correlation is recommended.      Signatures      ----------------------------------------------------------------   Electronically signed by Debbie Mccann MD (Interpreting   Cardiologist) on 04/11/2019 at 18:19   ----------------------------------------------------------------    Conclusions      Procedure Summary   ALL THE CORONARIES ARE LARGE, ECTATIC AWITH CALCIFICATION   LM-PATENT   LAD PROX 30% MID 50% , DISTAL 40% VERY DISTAL STENT PATENT   LCX- PROX 40%   RCA PROX 30% DISTAL 40%   Normal LV systolic function.  LVEF approximately 50% .   LV size - normal.   EDP 11 mmhg   No Transaortic Gradient      Recommendations   Continue with aggressive risk factor modification and medical therapy.      Estimated Blood Loss:5 ml.      Complications:No complications.      Signatures      ----------------------------------------------------------------   Electronically signed by Debbie Mccann MD (Performing   Physician) on 04/17/2019 at 08    ekg 6/8/2020  Sinus  Rhythm   -RSR(V1) -nondiagnostic. PROBABLY NORMAL      Assessment   Diagnosis Orders   1.  Coronary artery disease involving native coronary artery of native heart without angina pectoris     2. Chronic diastolic congestive heart failure ([address]     3. Pure hypercholesterolemia     4. Essential hypertension     5. S/P cardiac cath 2014 at [facility]- patent LAD stent done 2006 and 40 to 50% lesions in other coronaries     6. S/P angioplasty with stent of the LAD 2014           Plan   The most current  meds and labs reviewed       Continue the current treatment and with constant vigilance to changes in symptoms and also any potential side effects. Return for care or seek medical attention immediately if symptoms got worse and/or develop new symptoms. Coronary artery disease, seems to be stable. Denies angina or change in breathing pattern  hX OF lad STENT 2006  The cath report from [facility] reviewed and stable done 2014  Echo WNL  Cath 2019- no obstructive ds     Hyperlipidemia: on statins, followed periodically. Patient need periodic lipid and liver profile. Lipid panel and liver function test before next appointment asap    Congestive heart failure: no evidence of fluid overload today, no recent hospitalization for CHF   hx of Leg edema +2--RESOLVED  Cont  aldactazide 25/25 1/2 tab po qd  BMP and Mg stable    Chronic K intake prior to aldactazide   Cont kcl  K 4.2 and now 4    Lipid panel and liver function test before next appointment  Lab prior to next visit    Discussed use, benefit, and side effects of prescribed medications. All patient questions answered. Pt voiced understanding. Instructed to continue current medications, diet and exercise. Continue risk factor modification and medical management. Patient agreed with treatment plan. Follow up as directed.       RTC in 6 month    [facility]

## 2020-12-15 ENCOUNTER — OFFICE VISIT (OUTPATIENT)
Dept: FAMILY MEDICINE CLINIC | Age: 75
End: 2020-12-15

## 2020-12-15 VITALS
TEMPERATURE: 96.6 F | SYSTOLIC BLOOD PRESSURE: 122 MMHG | WEIGHT: 239.5 LBS | BODY MASS INDEX: 34.29 KG/M2 | HEIGHT: 70 IN | HEART RATE: 68 BPM | RESPIRATION RATE: 14 BRPM | DIASTOLIC BLOOD PRESSURE: 62 MMHG

## 2020-12-15 PROCEDURE — 99213 OFFICE O/P EST LOW 20 MIN: CPT | Performed by: FAMILY MEDICINE

## 2020-12-15 SDOH — ECONOMIC STABILITY: TRANSPORTATION INSECURITY
IN THE PAST 12 MONTHS, HAS LACK OF TRANSPORTATION KEPT YOU FROM MEETINGS, WORK, OR FROM GETTING THINGS NEEDED FOR DAILY LIVING?: NOT ASKED

## 2020-12-15 SDOH — ECONOMIC STABILITY: INCOME INSECURITY: HOW HARD IS IT FOR YOU TO PAY FOR THE VERY BASICS LIKE FOOD, HOUSING, MEDICAL CARE, AND HEATING?: NOT HARD AT ALL

## 2020-12-15 SDOH — ECONOMIC STABILITY: TRANSPORTATION INSECURITY
IN THE PAST 12 MONTHS, HAS THE LACK OF TRANSPORTATION KEPT YOU FROM MEDICAL APPOINTMENTS OR FROM GETTING MEDICATIONS?: NOT ASKED

## 2020-12-15 SDOH — ECONOMIC STABILITY: FOOD INSECURITY: WITHIN THE PAST 12 MONTHS, THE FOOD YOU BOUGHT JUST DIDN'T LAST AND YOU DIDN'T HAVE MONEY TO GET MORE.: NEVER TRUE

## 2020-12-15 SDOH — ECONOMIC STABILITY: FOOD INSECURITY: WITHIN THE PAST 12 MONTHS, YOU WORRIED THAT YOUR FOOD WOULD RUN OUT BEFORE YOU GOT MONEY TO BUY MORE.: NEVER TRUE

## 2020-12-15 ASSESSMENT — ENCOUNTER SYMPTOMS
TROUBLE SWALLOWING: 0
CHEST TIGHTNESS: 0
NAUSEA: 0
SHORTNESS OF BREATH: 0
EYE PAIN: 0
ABDOMINAL PAIN: 0
BLOOD IN STOOL: 0
BACK PAIN: 0
SORE THROAT: 0
CONSTIPATION: 0
COUGH: 0

## 2020-12-15 NOTE — PROGRESS NOTES
Subjective:      Patient ID: He Welch is a 67 y.o. male. bph    Noted and  More  Frequent     Flank Pain  This is a new problem. The current episode started in the past 7 days. The problem has been resolved since onset. The pain does not radiate. The pain is at a severity of 0/10. The patient is experiencing no pain. Stiffness is present all day. Pertinent negatives include no abdominal pain, chest pain, fever, headaches or weakness. ( Left side  Kidney  Stone  passed) The treatment provided significant relief. Past Medical History:   Diagnosis Date    CAD (coronary artery disease) 01/2006    cath with stent lad    Chronic back pain 2013      epidural block    Colon polyps 10/2005    Diverticulosis of colon     DVT of proximal leg (deep vein thrombosis) ([facility] 12/04 2013    left     right  in 2013    GERD (gastroesophageal reflux disease)     Kidney stone on left side 12/2020    left  side  in  2009  also    Pulmonary embolus ([facility] 6-2013 and  1-2015     had   right  leg  dvt    S/P cardiac cath 2014 at [facility]- patent LAD stent done 2006 and 40 to 50% lesions in other coronaries 04/12/2019    S/P cardiac cath 4/17/19- large all ectatic vessel, lm-p, lad prox 40%, mid 50%, distal 40 distal stent -patent, lcx prox 40%, rca prox 30%, dist 40%, edp 11, ef 50%- med  RX 04/17/2019    SOB (shortness of breath) on exertion 06/08/2020    Unspecified sleep apnea 2013    cpap     Review of Systems   Constitutional: Negative for fatigue and fever. HENT: Negative for congestion, ear pain, postnasal drip, sore throat and trouble swallowing. Eyes: Negative for pain. Respiratory: Negative for cough, chest tightness and shortness of breath. Cardiovascular: Negative for chest pain, palpitations and leg swelling. Gastrointestinal: Negative for abdominal pain, blood in stool, constipation and nausea. Genitourinary: Positive for flank pain. Negative for difficulty urinating, frequency and urgency. Musculoskeletal: Negative for arthralgias, back pain, joint swelling and neck stiffness. Skin: Negative for rash. Neurological: Negative for dizziness, weakness and headaches. Hematological: Negative for adenopathy. Does not bruise/bleed easily. Psychiatric/Behavioral: Negative for behavioral problems, dysphoric mood and sleep disturbance. /62 (Site: Right Upper Arm, Position: Sitting, Cuff Size: Large Adult)   Pulse 68   Temp 96.6 °F (35.9 °C) (Skin)   Resp 14   Ht 5' 10\" (1.778 m)   Wt 239 lb 8 oz (108.6 kg)   BMI 34.36 kg/m²   Objective:   Physical Exam  Vitals signs and nursing note reviewed. Constitutional:       Appearance: He is well-developed. HENT:      Head: Normocephalic and atraumatic. Right Ear: External ear normal.      Left Ear: External ear normal.      Nose: Nose normal.   Eyes:      Conjunctiva/sclera: Conjunctivae normal.      Pupils: Pupils are equal, round, and reactive to light. Comments: Fundi nl   Neck:      Musculoskeletal: Normal range of motion and neck supple. Thyroid: No thyromegaly. Cardiovascular:      Rate and Rhythm: Normal rate and regular rhythm. Heart sounds: Normal heart sounds. Pulmonary:      Effort: Pulmonary effort is normal.      Breath sounds: Normal breath sounds. No wheezing or rales. Abdominal:      General: Bowel sounds are normal.      Palpations: Abdomen is soft. There is no mass. Tenderness: There is no abdominal tenderness. Musculoskeletal: Normal range of motion. Lymphadenopathy:      Cervical: No cervical adenopathy. Skin:     General: Skin is warm and dry. Findings: No rash. Neurological:      Mental Status: He is alert and oriented to person, place, and time. Cranial Nerves: No cranial nerve deficit. Deep Tendon Reflexes: Reflexes are normal and symmetric.          Assessment: Diagnosis Orders   1. Kidney stone on left side     2. Gastroesophageal reflux disease without esophagitis     3. Coronary artery disease involving native coronary artery of native heart without angina pectoris     4. Chronic diastolic congestive heart failure ([address]     5. Essential hypertension     6. Pure hypercholesterolemia     7. Other chronic pulmonary embolism without acute cor pulmonale (HCC)           Plan:      Current Outpatient Medications   Medication Sig Dispense Refill    warfarin (JANTOVEN) 5 MG tablet TAKE 1 TABLET BY MOUTH ONE TIME A DAY AS DIRECTED BY COUMADIN CLINIC 120 tablet 1    warfarin (JANTOVEN) 5 MG tablet TAKE 1 TABLET BY MOUTH ONE TIME A DAY AS DIRECTED BY COUMADIN CLINIC 30 tablet 0    ondansetron (ZOFRAN) 4 MG tablet 1 tablet every 6-8 hours for nausea vomiting 12 tablet 0    clotrimazole-betamethasone (LOTRISONE) 1-0.05 % cream Apply topically 2 times daily. Lotrisone 1-0.05% Cream 45 g 3    pantoprazole (PROTONIX) 40 MG tablet TAKE 1 TABLET BY MOUTH ONE TIME A DAY  90 tablet 0    atorvastatin (LIPITOR) 80 MG tablet TAKE 1 TABLET BY MOUTH ONE TIME A DAY  90 tablet 0    spironolactone-hydrochlorothiazide (ALDACTAZIDE) 25-25 MG per tablet Take 0.5 tablets by mouth daily Take 0.5mg daily 45 tablet 3    potassium chloride (KLOR-CON M) 20 MEQ extended release tablet TAKE 1 TABLET BY MOUTH ONE TIME A DAY  90 tablet 1    folic acid (FOLVITE) 1 MG tablet TAKE 1 TABLET BY MOUTH ONE TIME A DAY. [address] 60 tablet 5    metoprolol succinate (TOPROL XL) 25 MG extended release tablet TAKE 1/2 TABLET BY MOUTH ONE TIME A DAY  45 tablet 3    nitroGLYCERIN (NITROSTAT) 0.4 MG SL tablet up to max of 3 total doses.  If no relief after 1 dose, call 911. 25 tablet 3    Ascorbic Acid (VITAMIN C) 1000 MG tablet Take 1,000 mg by mouth daily      Multiple Vitamins-Minerals (MULTI COMPLETE PO) Take 1 tablet by mouth daily

## 2021-01-01 DIAGNOSIS — E78.00 PURE HYPERCHOLESTEROLEMIA: Chronic | ICD-10-CM

## 2021-01-04 NOTE — TELEPHONE ENCOUNTER
Date of last visit:  12/15/2020  Date of next visit:  1/27/2021    Requested Prescriptions     Pending Prescriptions Disp Refills    atorvastatin (LIPITOR) 80 MG tablet [Pharmacy Med Name: Atorvastatin Calcium Oral Tablet 80 MG] 90 tablet 0     Sig: TAKE 1 TABLET BY MOUTH EVERY DAY    pantoprazole (PROTONIX) 40 MG tablet [Pharmacy Med Name: Pantoprazole Sodium Oral Tablet Delayed Release 40 MG] 90 tablet 0     Sig: TAKE 1 TABLET BY MOUTH EVERY DAY

## 2021-01-05 RX ORDER — PANTOPRAZOLE SODIUM 40 MG/1
TABLET, DELAYED RELEASE ORAL
Qty: 90 TABLET | Refills: 0 | Status: SHIPPED | OUTPATIENT
Start: 2021-01-05 | End: 2021-03-30

## 2021-01-05 RX ORDER — ATORVASTATIN CALCIUM 80 MG/1
TABLET, FILM COATED ORAL
Qty: 90 TABLET | Refills: 0 | Status: SHIPPED | OUTPATIENT
Start: 2021-01-05 | End: 2021-03-30

## 2021-01-11 DIAGNOSIS — I25.10 CORONARY ARTERY DISEASE INVOLVING NATIVE CORONARY ARTERY OF NATIVE HEART WITHOUT ANGINA PECTORIS: ICD-10-CM

## 2021-01-12 NOTE — TELEPHONE ENCOUNTER
Date of last visit:  12/15/2020  Date of next visit:  1/27/2021    Requested Prescriptions     Pending Prescriptions Disp Refills    potassium chloride (KLOR-CON M) 20 MEQ extended release tablet [Pharmacy Med Name: Potassium Chloride Trinh ER Oral Tablet Extended Release 20 MEQ] 90 tablet 1     Sig: TAKE 1 TABLET BY MOUTH ONE TIME A DAY

## 2021-01-13 RX ORDER — POTASSIUM CHLORIDE 20 MEQ/1
TABLET, EXTENDED RELEASE ORAL
Qty: 90 TABLET | Refills: 1 | Status: SHIPPED | OUTPATIENT
Start: 2021-01-13 | End: 2021-07-05

## 2021-01-27 ENCOUNTER — OFFICE VISIT (OUTPATIENT)
Dept: FAMILY MEDICINE CLINIC | Age: 76
End: 2021-01-27

## 2021-01-27 VITALS
RESPIRATION RATE: 14 BRPM | WEIGHT: 238.13 LBS | SYSTOLIC BLOOD PRESSURE: 120 MMHG | HEART RATE: 68 BPM | DIASTOLIC BLOOD PRESSURE: 78 MMHG | BODY MASS INDEX: 34.09 KG/M2 | HEIGHT: 70 IN | TEMPERATURE: 96.4 F

## 2021-01-27 DIAGNOSIS — K57.30 DIVERTICULOSIS OF COLON: ICD-10-CM

## 2021-01-27 DIAGNOSIS — I27.82 OTHER CHRONIC PULMONARY EMBOLISM WITHOUT ACUTE COR PULMONALE (HCC): ICD-10-CM

## 2021-01-27 DIAGNOSIS — N40.0 BENIGN PROSTATIC HYPERPLASIA WITHOUT LOWER URINARY TRACT SYMPTOMS: ICD-10-CM

## 2021-01-27 DIAGNOSIS — G89.29 CHRONIC MIDLINE LOW BACK PAIN WITHOUT SCIATICA: ICD-10-CM

## 2021-01-27 DIAGNOSIS — I82.5Y9 CHRONIC DEEP VEIN THROMBOSIS (DVT) OF PROXIMAL VEIN OF LOWER EXTREMITY, UNSPECIFIED LATERALITY (HCC): ICD-10-CM

## 2021-01-27 DIAGNOSIS — G47.33 OBSTRUCTIVE SLEEP APNEA SYNDROME: ICD-10-CM

## 2021-01-27 DIAGNOSIS — I50.32 CHRONIC DIASTOLIC CONGESTIVE HEART FAILURE (HCC): ICD-10-CM

## 2021-01-27 DIAGNOSIS — K21.9 GASTROESOPHAGEAL REFLUX DISEASE WITHOUT ESOPHAGITIS: ICD-10-CM

## 2021-01-27 DIAGNOSIS — M54.50 CHRONIC MIDLINE LOW BACK PAIN WITHOUT SCIATICA: ICD-10-CM

## 2021-01-27 DIAGNOSIS — I10 ESSENTIAL HYPERTENSION: Primary | ICD-10-CM

## 2021-01-27 DIAGNOSIS — E78.00 PURE HYPERCHOLESTEROLEMIA: Chronic | ICD-10-CM

## 2021-01-27 DIAGNOSIS — I25.10 CORONARY ARTERY DISEASE INVOLVING NATIVE CORONARY ARTERY OF NATIVE HEART WITHOUT ANGINA PECTORIS: ICD-10-CM

## 2021-01-27 PROCEDURE — 99213 OFFICE O/P EST LOW 20 MIN: CPT | Performed by: FAMILY MEDICINE

## 2021-01-27 RX ORDER — PREDNISONE 20 MG/1
TABLET ORAL
Qty: 12 TABLET | Refills: 0 | Status: SHIPPED | OUTPATIENT
Start: 2021-01-27 | End: 2021-04-29 | Stop reason: ALTCHOICE

## 2021-01-27 RX ORDER — TIZANIDINE 2 MG/1
2 TABLET ORAL 3 TIMES DAILY PRN
Qty: 30 TABLET | Refills: 0 | Status: SHIPPED | OUTPATIENT
Start: 2021-01-27

## 2021-01-27 ASSESSMENT — PATIENT HEALTH QUESTIONNAIRE - PHQ9
SUM OF ALL RESPONSES TO PHQ9 QUESTIONS 1 & 2: 0
1. LITTLE INTEREST OR PLEASURE IN DOING THINGS: 0
2. FEELING DOWN, DEPRESSED OR HOPELESS: 0

## 2021-01-27 ASSESSMENT — ENCOUNTER SYMPTOMS
SHORTNESS OF BREATH: 0
CONSTIPATION: 0
CHEST TIGHTNESS: 0
SORE THROAT: 0
NAUSEA: 0
BACK PAIN: 0
HEARTBURN: 0
BLOOD IN STOOL: 0
HOARSE VOICE: 0
ABDOMINAL PAIN: 0
EYE PAIN: 0
TROUBLE SWALLOWING: 0
COUGH: 0
ORTHOPNEA: 0

## 2021-01-27 NOTE — PROGRESS NOTES
Subjective:      Patient ID: Rhett Cifuentes is a 67 y.o. male. Chronic  Back pain  stable   Stable       chf diastolic    Stable      ashd  Stable       dvt and  PE  stABLE   BY  HISTORY      Hypertension  This is a chronic problem. The current episode started more than 1 year ago. The problem has been resolved since onset. The problem is controlled. Pertinent negatives include no chest pain, headaches, orthopnea, palpitations, peripheral edema or shortness of breath. The current treatment provides significant improvement. There are no compliance problems. Gastroesophageal Reflux  He reports no abdominal pain, no chest pain, no coughing, no early satiety, no heartburn, no hoarse voice, no nausea or no sore throat. This is a chronic problem. The current episode started more than 1 year ago. The problem occurs rarely. The problem has been resolved. Pertinent negatives include no fatigue. Risk factors include obesity. He has tried a PPI for the symptoms. The treatment provided significant relief. Hyperlipidemia  This is a chronic problem. The current episode started more than 1 year ago. The problem is controlled. Pertinent negatives include no chest pain, focal weakness, leg pain, myalgias or shortness of breath. The current treatment provides significant improvement of lipids. Compliance problems include adherence to exercise.       Past Medical History:   Diagnosis Date    CAD (coronary artery disease) 01/2006    cath with stent lad    Chronic back pain 2013      epidural block    Colon polyps 10/2005    Diverticulosis of colon     DVT of proximal leg (deep vein thrombosis) ([address] 12/04 2013    left     right  in 2013    GERD (gastroesophageal reflux disease)     Kidney stone on left side 12/2020    left  side  in  2009  also    Pulmonary embolus ([address] 6-2013 and  1-2015     had   right  leg  dvt Neck:      Musculoskeletal: Normal range of motion and neck supple. Thyroid: No thyromegaly. Cardiovascular:      Rate and Rhythm: Normal rate and regular rhythm. Heart sounds: Normal heart sounds. Pulmonary:      Effort: Pulmonary effort is normal.      Breath sounds: Normal breath sounds. No wheezing or rales. Abdominal:      General: Bowel sounds are normal.      Palpations: Abdomen is soft. There is no mass. Tenderness: There is no abdominal tenderness. Musculoskeletal: Normal range of motion. Lymphadenopathy:      Cervical: No cervical adenopathy. Skin:     General: Skin is warm and dry. Findings: No rash. Neurological:      Mental Status: He is alert and oriented to person, place, and time. Cranial Nerves: No cranial nerve deficit. Deep Tendon Reflexes: Reflexes are normal and symmetric. Assessment:       Diagnosis Orders   1. Essential hypertension     2. Obstructive sleep apnea syndrome     3. Pure hypercholesterolemia     4. Other chronic pulmonary embolism without acute cor pulmonale (HCC)     5. Gastroesophageal reflux disease without esophagitis     6. Chronic deep vein thrombosis (DVT) of proximal vein of lower extremity, unspecified laterality ([address]     7. Chronic diastolic congestive heart failure ([address]     8. Chronic midline low back pain without sciatica     9. Coronary artery disease involving native coronary artery of native heart without angina pectoris     10.  Diverticulosis of colon           Plan:      Current Outpatient Medications   Medication Sig Dispense Refill    potassium chloride (KLOR-CON M) 20 MEQ extended release tablet TAKE 1 TABLET BY MOUTH ONE TIME A DAY  90 tablet 1    atorvastatin (LIPITOR) 80 MG tablet TAKE 1 TABLET BY MOUTH EVERY DAY  90 tablet 0    pantoprazole (PROTONIX) 40 MG tablet TAKE 1 TABLET BY MOUTH EVERY DAY  90 tablet 0  warfarin (JANTOVEN) 5 MG tablet TAKE 1 TABLET BY MOUTH ONE TIME A DAY AS DIRECTED BY COUMADIN CLINIC 120 tablet 1    warfarin (JANTOVEN) 5 MG tablet TAKE 1 TABLET BY MOUTH ONE TIME A DAY AS DIRECTED BY COUMADIN CLINIC 30 tablet 0    ondansetron (ZOFRAN) 4 MG tablet 1 tablet every 6-8 hours for nausea vomiting 12 tablet 0    clotrimazole-betamethasone (LOTRISONE) 1-0.05 % cream Apply topically 2 times daily. Lotrisone 1-0.05% Cream 45 g 3    spironolactone-hydrochlorothiazide (ALDACTAZIDE) 25-25 MG per tablet Take 0.5 tablets by mouth daily Take 0.5mg daily 45 tablet 3    folic acid (FOLVITE) 1 MG tablet TAKE 1 TABLET BY MOUTH ONE TIME A DAY. [address] 60 tablet 5    metoprolol succinate (TOPROL XL) 25 MG extended release tablet TAKE 1/2 TABLET BY MOUTH ONE TIME A DAY  45 tablet 3    nitroGLYCERIN (NITROSTAT) 0.4 MG SL tablet up to max of 3 total doses. If no relief after 1 dose, call 911. 25 tablet 3    Ascorbic Acid (VITAMIN C) 1000 MG tablet Take 1,000 mg by mouth daily      Multiple Vitamins-Minerals (MULTI COMPLETE PO) Take 1 tablet by mouth daily      Omega-3 Fatty Acids (FISH OIL) 1000 MG CAPS Take 3,000 mg by mouth daily      aspirin 81 MG EC tablet Take 81 mg by mouth daily. No current facility-administered medications for this visit. Orders Placed This Encounter   Procedures    TSH with Reflex     Standing Status:   Future     Standing Expiration Date:   1/27/2022    CBC Auto Differential     Standing Status:   Future     Standing Expiration Date:   1/27/2022    PSA, Prostatic Specific Antigen     Standing Status:   Future     Standing Expiration Date:   1/27/2022   No results found for this visit on 01/27/21.     Walker Aguero MD

## 2021-02-13 LAB
ABSOLUTE BASO #: 0 X10E9/L (ref 0–0.2)
ABSOLUTE EOS #: 0.3 X10E9/L (ref 0–0.4)
ABSOLUTE LYMPH #: 1.8 X10E9/L (ref 1–3.5)
ABSOLUTE MONO #: 0.9 X10E9/L (ref 0–0.9)
ABSOLUTE NEUT #: 4.6 X10E9/L (ref 1.5–6.6)
ALBUMIN SERPL-MCNC: 4.2 G/DL (ref 3.2–5.3)
ALK PHOSPHATASE: 62 U/L (ref 39–130)
ALT SERPL-CCNC: 31 U/L (ref 0–40)
ANION GAP SERPL CALCULATED.3IONS-SCNC: 8 MMOL/L (ref 5–15)
AST SERPL-CCNC: 34 U/L (ref 0–41)
BASOPHILS RELATIVE PERCENT: 0.6 %
BILIRUB SERPL-MCNC: 1.3 MG/DL (ref 0.3–1.2)
BILIRUBIN DIRECT: 0.3 MG/DL (ref 0–0.4)
BUN BLDV-MCNC: 20 MG/DL (ref 5–27)
CALCIUM SERPL-MCNC: 9.7 MG/DL (ref 8.5–10.5)
CHLORIDE BLD-SCNC: 103 MMOL/L (ref 98–109)
CHOLESTEROL/HDL RATIO: 3.1 (ref 1–5)
CHOLESTEROL: 150 MG/DL (ref 150–200)
CO2: 29 MMOL/L (ref 22–32)
CREAT SERPL-MCNC: 0.94 MG/DL (ref 0.6–1.3)
EGFR AFRICAN AMERICAN: >60 ML/MIN/1.73SQ.M
EGFR IF NONAFRICAN AMERICAN: >60 ML/MIN/1.73SQ.M
EOSINOPHILS RELATIVE PERCENT: 3.8 %
GLUCOSE: 103 MG/DL (ref 65–99)
HCT VFR BLD CALC: 43.4 % (ref 39–49)
HDLC SERPL-MCNC: 48 MG/DL
HEMOGLOBIN: 14.9 G/DL (ref 13–17)
LDL CHOLESTEROL CALCULATED: 81 MG/DL
LDL/HDL RATIO: 1.7
LYMPHOCYTE %: 23.6 %
MAGNESIUM: 2 MG/DL (ref 1.8–2.6)
MCH RBC QN AUTO: 31.4 PG (ref 27–34)
MCHC RBC AUTO-ENTMCNC: 34.3 G/DL (ref 32–36)
MCV RBC AUTO: 91 FL (ref 80–100)
MONOCYTES # BLD: 12.3 %
NEUTROPHILS RELATIVE PERCENT: 59.7 %
PDW BLD-RTO: 14 % (ref 11.5–15)
PLATELETS: 180 X10E9/L (ref 150–450)
PMV BLD AUTO: 8.7 FL (ref 7–12)
POTASSIUM SERPL-SCNC: 4.1 MMOL/L (ref 3.5–5)
PSA, ULTRASENSITIVE: 3.15 NG/ML (ref 0–4)
RBC: 4.75 X10E12/L (ref 4.1–5.7)
SODIUM BLD-SCNC: 140 MMOL/L (ref 134–146)
TOTAL PROTEIN: 6.8 G/DL (ref 6–8)
TRIGL SERPL-MCNC: 104 MG/DL (ref 27–150)
TSH SERPL DL<=0.05 MIU/L-ACNC: 1.79 UIU/ML (ref 0.49–4.67)
VLDLC SERPL CALC-MCNC: 21 MG/DL (ref 0–30)
WBC: 7.8 X10E9/L (ref 4–11)

## 2021-02-15 ENCOUNTER — TELEPHONE (OUTPATIENT)
Dept: FAMILY MEDICINE CLINIC | Age: 76
End: 2021-02-15

## 2021-03-28 DIAGNOSIS — I25.10 CORONARY ARTERY DISEASE INVOLVING NATIVE CORONARY ARTERY OF NATIVE HEART WITHOUT ANGINA PECTORIS: ICD-10-CM

## 2021-03-28 DIAGNOSIS — D68.9 COAGULOPATHY (HCC): ICD-10-CM

## 2021-03-28 DIAGNOSIS — E78.00 PURE HYPERCHOLESTEROLEMIA: Chronic | ICD-10-CM

## 2021-03-29 NOTE — TELEPHONE ENCOUNTER
Date of last visit:  1/27/2021  Date of next visit:  4/29/2021    Requested Prescriptions     Pending Prescriptions Disp Refills    folic acid (FOLVITE) 1 MG tablet [Pharmacy Med Name: Folic Acid Oral Tablet 1 MG] 60 tablet 0     Sig: TAKE 1 TABLET BY MOUTH ONE TIME A DAY (generic for folvite)    metoprolol succinate (TOPROL XL) 25 MG extended release tablet [Pharmacy Med Name: Metoprolol Succinate ER Oral Tablet Extended Release 24 Hour 25 MG] 45 tablet 0     Sig: TAKE 1/2 TABLET BY MOUTH ONE TIME A DAY    pantoprazole (PROTONIX) 40 MG tablet [Pharmacy Med Name: Pantoprazole Sodium Oral Tablet Delayed Release 40 MG] 90 tablet 0     Sig: TAKE 1 TABLET BY MOUTH EVERY DAY    atorvastatin (LIPITOR) 80 MG tablet [Pharmacy Med Name: Atorvastatin Calcium Oral Tablet 80 MG] 90 tablet 0     Sig: TAKE 1 TABLET BY MOUTH EVERY DAY

## 2021-03-30 RX ORDER — METOPROLOL SUCCINATE 25 MG/1
TABLET, EXTENDED RELEASE ORAL
Qty: 45 TABLET | Refills: 0 | Status: SHIPPED | OUTPATIENT
Start: 2021-03-30 | End: 2021-06-21

## 2021-03-30 RX ORDER — PANTOPRAZOLE SODIUM 40 MG/1
TABLET, DELAYED RELEASE ORAL
Qty: 90 TABLET | Refills: 0 | Status: SHIPPED | OUTPATIENT
Start: 2021-03-30 | End: 2021-06-29

## 2021-03-30 RX ORDER — FOLIC ACID 1 MG/1
TABLET ORAL
Qty: 60 TABLET | Refills: 0 | Status: SHIPPED | OUTPATIENT
Start: 2021-03-30 | End: 2021-06-22

## 2021-03-30 RX ORDER — ATORVASTATIN CALCIUM 80 MG/1
TABLET, FILM COATED ORAL
Qty: 90 TABLET | Refills: 0 | Status: SHIPPED | OUTPATIENT
Start: 2021-03-30 | End: 2021-06-29

## 2021-04-05 ENCOUNTER — TELEPHONE (OUTPATIENT)
Dept: FAMILY MEDICINE CLINIC | Age: 76
End: 2021-04-05

## 2021-04-05 DIAGNOSIS — G47.33 OBSTRUCTIVE SLEEP APNEA SYNDROME: Primary | ICD-10-CM

## 2021-04-05 NOTE — TELEPHONE ENCOUNTER
Pt called asking if we could do a referral to replace Dr Tracee Yu? Pt informed to check with his insurance who is in network and call us back.

## 2021-04-06 ENCOUNTER — TELEPHONE (OUTPATIENT)
Dept: FAMILY MEDICINE CLINIC | Age: 76
End: 2021-04-06

## 2021-04-07 NOTE — TELEPHONE ENCOUNTER
Pt called back stating he is ok with either [facility] or [facility] for referral.    He stated doctor's choice.

## 2021-04-08 NOTE — TELEPHONE ENCOUNTER
Called [facility] and they requested we fax the referral to fax #: [phone] and they will contact the patient with an appt day and time. Referral faxed.

## 2021-04-29 ENCOUNTER — OFFICE VISIT (OUTPATIENT)
Dept: FAMILY MEDICINE CLINIC | Age: 76
End: 2021-04-29

## 2021-04-29 VITALS
SYSTOLIC BLOOD PRESSURE: 124 MMHG | HEART RATE: 60 BPM | TEMPERATURE: 96.6 F | DIASTOLIC BLOOD PRESSURE: 74 MMHG | RESPIRATION RATE: 12 BRPM | WEIGHT: 236 LBS | HEIGHT: 70 IN | BODY MASS INDEX: 33.79 KG/M2

## 2021-04-29 DIAGNOSIS — Z86.718 HISTORY OF DVT (DEEP VEIN THROMBOSIS): ICD-10-CM

## 2021-04-29 DIAGNOSIS — E66.9 CLASS 1 OBESITY WITHOUT SERIOUS COMORBIDITY WITH BODY MASS INDEX (BMI) OF 33.0 TO 33.9 IN ADULT, UNSPECIFIED OBESITY TYPE: ICD-10-CM

## 2021-04-29 DIAGNOSIS — E78.00 PURE HYPERCHOLESTEROLEMIA: Chronic | ICD-10-CM

## 2021-04-29 DIAGNOSIS — I10 ESSENTIAL HYPERTENSION: Primary | ICD-10-CM

## 2021-04-29 DIAGNOSIS — K21.9 GASTROESOPHAGEAL REFLUX DISEASE WITHOUT ESOPHAGITIS: ICD-10-CM

## 2021-04-29 DIAGNOSIS — R35.0 BENIGN PROSTATIC HYPERPLASIA WITH URINARY FREQUENCY: ICD-10-CM

## 2021-04-29 DIAGNOSIS — I82.5Y9 CHRONIC DEEP VEIN THROMBOSIS (DVT) OF PROXIMAL VEIN OF LOWER EXTREMITY, UNSPECIFIED LATERALITY (HCC): ICD-10-CM

## 2021-04-29 DIAGNOSIS — I50.32 CHRONIC DIASTOLIC CONGESTIVE HEART FAILURE (HCC): ICD-10-CM

## 2021-04-29 DIAGNOSIS — N40.1 BENIGN PROSTATIC HYPERPLASIA WITH URINARY FREQUENCY: ICD-10-CM

## 2021-04-29 LAB
HEMOCCULT STL QL: NEGATIVE
HEMOCCULT STL QL: NORMAL
HEMOCCULT STL QL: NORMAL

## 2021-04-29 PROCEDURE — 82270 OCCULT BLOOD FECES: CPT | Performed by: FAMILY MEDICINE

## 2021-04-29 PROCEDURE — 99214 OFFICE O/P EST MOD 30 MIN: CPT | Performed by: FAMILY MEDICINE

## 2021-04-29 RX ORDER — TAMSULOSIN HYDROCHLORIDE 0.4 MG/1
0.4 CAPSULE ORAL DAILY
Qty: 30 CAPSULE | Refills: 0 | Status: SHIPPED | OUTPATIENT
Start: 2021-04-29

## 2021-04-29 ASSESSMENT — ENCOUNTER SYMPTOMS
CHEST TIGHTNESS: 0
ORTHOPNEA: 0
BLOOD IN STOOL: 0
SHORTNESS OF BREATH: 0
EYE PAIN: 0
SORE THROAT: 0
ABDOMINAL PAIN: 0
CONSTIPATION: 0
TROUBLE SWALLOWING: 0
BACK PAIN: 0
COUGH: 0
NAUSEA: 0

## 2021-04-29 NOTE — PROGRESS NOTES
Subjective:      Patient ID: Edwar Ignacio is a 67 y.o. male. ashd    Stable       Pulmonary    embolus and   dvt      Chronic  Back pain      Sleep apnea   Stable       Recent  Labs  Stable       urinary  Frequency   bph  With  Urgency     Hyperlipidemia  This is a chronic problem. The current episode started more than 1 year ago. The problem is controlled. Pertinent negatives include no chest pain, focal weakness, leg pain or shortness of breath. Current antihyperlipidemic treatment includes statins. The current treatment provides significant improvement of lipids. There are no compliance problems. Hypertension  This is a chronic problem. The current episode started more than 1 year ago. The problem has been resolved since onset. The problem is controlled. Pertinent negatives include no chest pain, headaches, orthopnea, palpitations, peripheral edema or shortness of breath. The current treatment provides significant improvement. There are no compliance problems. Gastroesophageal Reflux  He reports no abdominal pain, no chest pain, no coughing, no early satiety, no nausea or no sore throat. This is a chronic problem. The current episode started more than 1 year ago. The problem occurs rarely. The problem has been resolved. The symptoms are aggravated by certain foods. Pertinent negatives include no fatigue. He has tried a PPI for the symptoms. The treatment provided significant relief.      Past Medical History:   Diagnosis Date    CAD (coronary artery disease) 01/2006    cath with stent lad    Chronic back pain 2013      epidural block    Colon polyps 10/2005    Diverticulosis of colon     DVT of proximal leg (deep vein thrombosis) ([address] 12/04 2013    left     right  in 2013    GERD (gastroesophageal reflux disease)     Kidney stone on left side 12/2020    left  side  in  2009  also    Pulmonary embolus ([address] 6-2013 and  1-2015     had   right  leg  dvt    S/P cardiac cath 2014 at [facility]- together: Not on file     Attends Zoroastrianism service: Not on file     Active member of club or organization: Not on file     Attends meetings of clubs or organizations: Not on file     Relationship status: Not on file    Intimate partner violence     Fear of current or ex partner: Not on file     Emotionally abused: Not on file     Physically abused: Not on file     Forced sexual activity: Not on file   Other Topics Concern    Not on file   Social History Narrative    Not on file     Family History   Problem Relation Age of Onset    Heart Disease Father        Review of Systems   Constitutional: Negative for fatigue and fever. HENT: Negative for congestion, ear pain, postnasal drip, sore throat and trouble swallowing. Eyes: Negative for pain. Respiratory: Negative for cough, chest tightness and shortness of breath. Cardiovascular: Negative for chest pain, palpitations, orthopnea and leg swelling. Gastrointestinal: Negative for abdominal pain, blood in stool, constipation and nausea. Genitourinary: Negative for difficulty urinating, frequency and urgency. Musculoskeletal: Negative for arthralgias, back pain, joint swelling and neck stiffness. Skin: Negative for rash. Neurological: Negative for dizziness, focal weakness, weakness and headaches. Hematological: Negative for adenopathy. Does not bruise/bleed easily. Psychiatric/Behavioral: Negative for behavioral problems, dysphoric mood and sleep disturbance. /74 (Site: Right Upper Arm, Position: Sitting, Cuff Size: Medium Adult)   Pulse 60   Temp 96.6 °F (35.9 °C) (Infrared)   Resp 12   Ht 5' 10\" (1.778 m)   Wt 236 lb (107 kg)   BMI 33.86 kg/m²   Objective:   Physical Exam  Vitals signs and nursing note reviewed. Constitutional:       Appearance: He is well-developed. HENT:      Head: Normocephalic and atraumatic.       Right Ear: External ear normal.      Left Ear: External ear normal.      Nose: Nose normal.   Eyes: Conjunctiva/sclera: Conjunctivae normal.      Pupils: Pupils are equal, round, and reactive to light. Comments: Fundi nl   Neck:      Musculoskeletal: Normal range of motion and neck supple. Thyroid: No thyromegaly. Cardiovascular:      Rate and Rhythm: Normal rate and regular rhythm. Heart sounds: Normal heart sounds. Pulmonary:      Effort: Pulmonary effort is normal.      Breath sounds: Normal breath sounds. No wheezing or rales. Abdominal:      General: Bowel sounds are normal.      Palpations: Abdomen is soft. There is no mass. Tenderness: There is no abdominal tenderness. Hernia: There is no hernia in the left inguinal area or right inguinal area. Genitourinary:     Penis: Normal and circumcised. Testes:         Right: Testicular hydrocele (2 +) present. Epididymis:      Right: Normal. Not inflamed or enlarged. Left: Normal. Not inflamed or enlarged. Prostate: Enlarged (2 to 3+). Not tender and no nodules present. Rectum: Normal. Guaiac result negative. No mass, tenderness, anal fissure, external hemorrhoid or internal hemorrhoid. Normal anal tone. Musculoskeletal: Normal range of motion. Right lower leg: No edema. Left lower leg: No edema. Lymphadenopathy:      Cervical: No cervical adenopathy. Lower Body: No right inguinal adenopathy. No left inguinal adenopathy. Skin:     General: Skin is warm and dry. Findings: No rash. Neurological:      Mental Status: He is alert and oriented to person, place, and time. Cranial Nerves: No cranial nerve deficit. Deep Tendon Reflexes: Reflexes are normal and symmetric. Assessment:       Diagnosis Orders   1. Essential hypertension     2. Benign prostatic hyperplasia with urinary frequency  tamsulosin (FLOMAX) 0.4 MG capsule   3. Gastroesophageal reflux disease without esophagitis  POCT occult blood stool   4.  Chronic deep vein thrombosis (DVT) of proximal vein of lower extremity, unspecified laterality [address]     5. Chronic diastolic congestive heart failure ([facility]     6. Pure hypercholesterolemia     7. Class 1 obesity without serious comorbidity with body mass index (BMI) of 33.0 to 33.9 in adult, unspecified obesity type     8. History of DVT (deep vein thrombosis)           Plan:      Current Outpatient Medications   Medication Sig Dispense Refill    tamsulosin (FLOMAX) 0.4 MG capsule Take 1 capsule by mouth daily 30 capsule 0    folic acid (FOLVITE) 1 MG tablet TAKE 1 TABLET BY MOUTH ONE TIME A DAY (generic for folvite) 60 tablet 0    metoprolol succinate (TOPROL XL) 25 MG extended release tablet TAKE 1/2 TABLET BY MOUTH ONE TIME A DAY  45 tablet 0    pantoprazole (PROTONIX) 40 MG tablet TAKE 1 TABLET BY MOUTH EVERY DAY  90 tablet 0    atorvastatin (LIPITOR) 80 MG tablet TAKE 1 TABLET BY MOUTH EVERY DAY  90 tablet 0    tiZANidine (ZANAFLEX) 2 MG tablet Take 1 tablet by mouth 3 times daily as needed (BACK SPASM) 30 tablet 0    potassium chloride (KLOR-CON M) 20 MEQ extended release tablet TAKE 1 TABLET BY MOUTH ONE TIME A DAY  90 tablet 1    warfarin (JANTOVEN) 5 MG tablet TAKE 1 TABLET BY MOUTH ONE TIME A DAY AS DIRECTED BY COUMADIN CLINIC 30 tablet 0    ondansetron (ZOFRAN) 4 MG tablet 1 tablet every 6-8 hours for nausea vomiting 12 tablet 0    clotrimazole-betamethasone (LOTRISONE) 1-0.05 % cream Apply topically 2 times daily. Lotrisone 1-0.05% Cream 45 g 3    spironolactone-hydrochlorothiazide (ALDACTAZIDE) 25-25 MG per tablet Take 0.5 tablets by mouth daily Take 0.5mg daily 45 tablet 3    nitroGLYCERIN (NITROSTAT) 0.4 MG SL tablet up to max of 3 total doses.  If no relief after 1 dose, call 911. 25 tablet 3    Ascorbic Acid (VITAMIN C) 1000 MG tablet Take 1,000 mg by mouth daily      Multiple Vitamins-Minerals (MULTI COMPLETE PO) Take 1 tablet by mouth daily      Omega-3 Fatty Acids (FISH OIL) 1000 MG CAPS Take 3,000 mg by mouth daily      aspirin 81 MG EC

## 2021-06-21 ENCOUNTER — OFFICE VISIT (OUTPATIENT)
Dept: CARDIOLOGY CLINIC | Age: 76
End: 2021-06-21
Payer: MEDICARE

## 2021-06-21 VITALS
HEART RATE: 55 BPM | BODY MASS INDEX: 30.64 KG/M2 | WEIGHT: 214 LBS | DIASTOLIC BLOOD PRESSURE: 76 MMHG | SYSTOLIC BLOOD PRESSURE: 122 MMHG | HEIGHT: 70 IN

## 2021-06-21 DIAGNOSIS — D68.9 COAGULOPATHY (HCC): ICD-10-CM

## 2021-06-21 DIAGNOSIS — E78.00 PURE HYPERCHOLESTEROLEMIA: Chronic | ICD-10-CM

## 2021-06-21 DIAGNOSIS — Z95.820 S/P ANGIOPLASTY WITH STENT: ICD-10-CM

## 2021-06-21 DIAGNOSIS — I25.10 CORONARY ARTERY DISEASE INVOLVING NATIVE CORONARY ARTERY OF NATIVE HEART WITHOUT ANGINA PECTORIS: Primary | ICD-10-CM

## 2021-06-21 DIAGNOSIS — I10 ESSENTIAL HYPERTENSION: ICD-10-CM

## 2021-06-21 DIAGNOSIS — I50.32 CHRONIC DIASTOLIC CONGESTIVE HEART FAILURE (HCC): ICD-10-CM

## 2021-06-21 DIAGNOSIS — Z98.890 S/P CARDIAC CATH: ICD-10-CM

## 2021-06-21 DIAGNOSIS — R00.1 SINUS BRADYCARDIA: ICD-10-CM

## 2021-06-21 PROCEDURE — 99214 OFFICE O/P EST MOD 30 MIN: CPT | Performed by: INTERNAL MEDICINE

## 2021-06-21 PROCEDURE — 93000 ELECTROCARDIOGRAM COMPLETE: CPT | Performed by: INTERNAL MEDICINE

## 2021-06-21 RX ORDER — SPIRONOLACTONE AND HYDROCHLOROTHIAZIDE 25; 25 MG/1; MG/1
0.5 TABLET ORAL DAILY
Qty: 45 TABLET | Refills: 3 | Status: SHIPPED | OUTPATIENT
Start: 2021-06-21 | End: 2022-06-27

## 2021-06-21 NOTE — TELEPHONE ENCOUNTER
Date of last visit:  4/29/2021  Date of next visit:  8/3/2021    Requested Prescriptions     Pending Prescriptions Disp Refills    folic acid (FOLVITE) 1 MG tablet [Pharmacy Med Name: Folic Acid Oral Tablet 1 MG] 60 tablet 0     Sig: TAKE 1 TABLET BY MOUTH EVERY DAY (generic for folvite)

## 2021-06-21 NOTE — PROGRESS NOTES
Chief Complaint   Patient presents with    Check-Up    Coronary Artery Disease   Former pat of Dr. Sherren Mess  Hx of CAD s/p stent 8yrs back      Pt here for 6 mo check up    EKG done today     Pt states med list is correct from last appt did not bring bottles or list     Lost wt  Like 23 lb in 6 months    Leg edema better after aldactazide    Sob on exertion - chronic-unchanged    Denied Chest pain , palpitations or  dizziness    Gained 7 lb since  Last visit    Patient Active Problem List   Diagnosis    CAD (coronary artery disease)    DVT of proximal leg (deep vein thrombosis) ([address]    Diverticulosis of colon    Hyperlipidemia    Obesity    GERD (gastroesophageal reflux disease)    Colon polyps    Pulmonary embolus ([address]    Sleep apnea    Chronic back pain    Chronic diastolic congestive heart failure ([address]    [facility]- patent LAD stent done 2006 and 40 to 50% lesions in other coronaries    S/P angioplasty with stent of the LAD 2014    Essential hypertension       Past Surgical History:   Procedure Laterality Date    BACK SURGERY  5-    Dr Morris Crowe @ [facility])    [address]  2014       50%  lesion    bamdad    CARDIAC CATHETERIZATION  04/2019      dr Nichole Sevilla  and  diffuse  disease  30 to 50%    COLONOSCOPY  2012 2020    sheik    polyps   due   2025    CORONARY ANGIOPLASTY WITH STENT PLACEMENT  2006    LAD    EYE SURGERY  2011    bilateral    HERNIA REPAIR  85/03    umbilical    JOINT REPLACEMENT Right 04/2019     [place]  right  hip    VENA CAVA FILTER PLACEMENT  2016    placed  iin  1-2014  and  removed  5-2016       No Known Allergies     Family History   Problem Relation Age of Onset    Heart Disease Father         Social History     Socioeconomic History    Marital status:      Spouse name: Not on file    Number of children: Not on file    Years of education: Not on file    Highest education level: Not on file   Occupational History    Not on file   Tobacco Use    Smoking status: Never Smoker    Smokeless tobacco: Never Used   Vaping Use    Vaping Use: Never used   Substance and Sexual Activity    Alcohol use: Yes     Alcohol/week: 3.3 standard drinks     Types: 4 Standard drinks or equivalent per week    Drug use: No    Sexual activity: Yes     Partners: Female   Other Topics Concern    Not on file   Social History Narrative    Not on file     Social Determinants of Health     Financial Resource Strain: Low Risk     Difficulty of Paying Living Expenses: Not hard at all   Food Insecurity: No Food Insecurity    Worried About [address] in the Last Year: Never true    [facility] in the Last Year: Never true   Transportation Needs:     Lack of Transportation (Medical):      Lack of Transportation (Non-Medical):    Physical Activity:     Days of Exercise per Week:     Minutes of Exercise per Session:    Stress:     Feeling of Stress :    Social Connections:     Frequency of Communication with Friends and Family:     Frequency of Social Gatherings with Friends and Family:     Attends Pentecostalism Services:     Active Member of Clubs or Organizations:     Attends Club or Organization Meetings:     Marital Status:    Intimate Partner Violence:     Fear of Current or Ex-Partner:     Emotionally Abused:     Physically Abused:     Sexually Abused:        Current Outpatient Medications   Medication Sig Dispense Refill    tamsulosin (FLOMAX) 0.4 MG capsule Take 1 capsule by mouth daily 30 capsule 0    folic acid (FOLVITE) 1 MG tablet TAKE 1 TABLET BY MOUTH ONE TIME A DAY (generic for folvite) 60 tablet 0    metoprolol succinate (TOPROL XL) 25 MG extended release tablet TAKE 1/2 TABLET BY MOUTH ONE TIME A DAY  45 tablet 0    pantoprazole (PROTONIX) 40 MG tablet TAKE 1 TABLET BY MOUTH EVERY DAY  90 tablet 0    atorvastatin (LIPITOR) 80 MG tablet TAKE 1 TABLET BY MOUTH EVERY DAY  90 tablet 0    tiZANidine (ZANAFLEX) 2 MG tablet Take 1 tablet by mouth 3 times daily as needed (BACK SPASM) 30 tablet 0    potassium chloride (KLOR-CON M) 20 MEQ extended release tablet TAKE 1 TABLET BY MOUTH ONE TIME A DAY  90 tablet 1    warfarin (JANTOVEN) 5 MG tablet TAKE 1 TABLET BY MOUTH ONE TIME A DAY AS DIRECTED BY COUMADIN CLINIC 30 tablet 0    ondansetron (ZOFRAN) 4 MG tablet 1 tablet every 6-8 hours for nausea vomiting 12 tablet 0    clotrimazole-betamethasone (LOTRISONE) 1-0.05 % cream Apply topically 2 times daily. Lotrisone 1-0.05% Cream 45 g 3    spironolactone-hydrochlorothiazide (ALDACTAZIDE) 25-25 MG per tablet Take 0.5 tablets by mouth daily Take 0.5mg daily 45 tablet 3    nitroGLYCERIN (NITROSTAT) 0.4 MG SL tablet up to max of 3 total doses. If no relief after 1 dose, call 911. 25 tablet 3    Ascorbic Acid (VITAMIN C) 1000 MG tablet Take 1,000 mg by mouth daily      Multiple Vitamins-Minerals (MULTI COMPLETE PO) Take 1 tablet by mouth daily      Omega-3 Fatty Acids (FISH OIL) 1000 MG CAPS Take 3,000 mg by mouth daily      aspirin 81 MG EC tablet Take 81 mg by mouth daily. No current facility-administered medications for this visit. Review of Systems -     General ROS: negative  Psychological ROS: negative  Hematological and Lymphatic ROS: No history of blood clots or bleeding disorder. Respiratory ROS: no cough,  or wheezing, the rest see HPI  Cardiovascular ROS: See HPI  Gastrointestinal ROS: negative  Genito-Urinary ROS: no dysuria, trouble voiding, or hematuria  Musculoskeletal ROS: negative  Neurological ROS: no TIA or stroke symptoms  Dermatological ROS: negative      Blood pressure 122/76, pulse 55, height 5' 10\" (1.778 m), weight 214 lb (97.1 kg).         Physical Examination:    General appearance - alert, well appearing, and in no distress  HEENT- Pink conjunctiva  , Non-icteri sclera,PERRLA  Mental status - alert, oriented to person, place, and time  Neck - supple, no significant adenopathy, no JVD, or carotid bruits  Chest - clear to auscultation, no wheezes, rales or rhonchi, symmetric air entry  Heart - normal rate, regular rhythm, normal S1, S2, no murmurs, rubs, clicks or gallops  Abdomen - soft, nontender, nondistended, no masses or organomegaly  MIN- no CVA or flank tenderness, no suprapubic tenderness  Neurological - alert, oriented, normal speech, no focal findings or movement disorder noted  Musculoskeletal/limbs - no joint tenderness, deformity or swelling   - peripheral pulses normal, no pedal edema, no clubbing or cyanosis  Skin - normal coloration and turgor, no rashes, no suspicious skin lesions noted  Psych- appropriate mood and affect    Lab  No results for input(s): CKTOTAL, CKMB, CKMBINDEX, TROPONINI in the last 72 hours.   CBC:   Lab Results   Component Value Date    WBC 7.8 02/12/2021    WBC 9.4 12/06/2020    RBC 4.75 02/12/2021    HGB 14.9 02/12/2021    HCT 43.4 02/12/2021    MCV 91 02/12/2021    MCH 31.4 02/12/2021    MCHC 34.3 02/12/2021    RDW 14.0 02/12/2021     02/12/2021     12/06/2020    MPV 8.7 02/12/2021     BMP:    Lab Results   Component Value Date     02/12/2021    K 4.1 02/12/2021    K 4.1 04/17/2019     02/12/2021    CO2 29 02/12/2021    BUN 20 02/12/2021    LABALBU 4.2 02/12/2021    CREATININE 0.94 02/12/2021    CALCIUM 9.7 02/12/2021    LABGLOM 82 12/06/2020    GLUCOSE 103 02/12/2021     Hepatic Function Panel:    Lab Results   Component Value Date    ALKPHOS 62 02/12/2021    ALKPHOS 74 12/06/2020    ALT 31 02/12/2021    AST 34 02/12/2021    PROT 6.8 02/12/2021    BILITOT 1.3 02/12/2021    BILIDIR 0.3 02/12/2021    LABALBU 4.2 02/12/2021     Magnesium:    Lab Results   Component Value Date    MG 2.0 02/12/2021     Warfarin PT/INR:  No components found for: PTPATWAR, PTINRWAR  HgBA1c:  No results found for: LABA1C  FLP:    Lab Results   Component Value Date    TRIG 104 02/12/2021    HDL 48 02/12/2021    LDLCALC 81 02/12/2021    LABVLDL 21 02/12/2021     TSH:    Lab Results   Component Value Date    TSH 1.79 02/12/2021       EKG 12/19/18  NSR No acute ekg abn    Cardiac cath 02/2014   Mild to mod CAD 40 to 50% stenosis and LAD stent done 2006 was patent    Conclusions      Summary   Normal left ventricle size and Low Normal LV systolic function.   Ejection fraction was estimated at 50 to 55 %.   There were no regional left ventricular wall motion abnormalities and wall   thickness was within normal limits.   Doppler parameters were consistent with abnormal left ventricular   relaxation (grade 1 diastolic dysfunction).   The left atrium is Mildly dilated.   Mildly enlarged right atrium size.   Mildly enlarged right ventricle cavity.   Right ventricle global systolic function is normal .      Signature      ----------------------------------------------------------------   Electronically signed by Radha Gallardo MD (Interpreting   physician) on 02/04/2019 at 06:02 PM    Conclusions      Summary   No ischemic EKG changes.   Nonspecific ST-T wave changes.   The T.I.D. ratio was 1.44 . ( Visually noted as well)   The nuclear images is suggestive for mild myocardial ischemia in the   inferior wall.      Recommendation   Clinical correlation is recommended.      Signatures      ----------------------------------------------------------------   Electronically signed by Radha Gallardo MD (Interpreting   Cardiologist) on 04/11/2019 at 18:19   ----------------------------------------------------------------    Conclusions      Procedure Summary   ALL THE CORONARIES ARE LARGE, ECTATIC AWITH CALCIFICATION   LM-PATENT   LAD PROX 30% MID 50% , DISTAL 40% VERY DISTAL STENT PATENT   LCX- PROX 40%   RCA PROX 30% DISTAL 40%   Normal LV systolic function.    LVEF approximately 50% .   LV size - normal.   EDP 11 mmhg   No Transaortic Gradient      Recommendations   Continue with aggressive risk factor modification and medical therapy.      Estimated Blood Loss:5 ml.      Complications:No complications.      Signatures      ----------------------------------------------------------------   Electronically signed by Radha Gallardo MD (Performing   Physician) on 04/17/2019 at 08    ekg 6/8/2020  Sinus  Rhythm   -RSR(V1) -nondiagnostic. PROBABLY NORMAL    ekg 6/21/21  Sinus    Bradycardia  - occasional PAC     # PACs = 1. Low voltage -possible pulmonary disease. ABNORMAL       Assessment   Diagnosis Orders   1. Coronary artery disease involving native coronary artery of native heart without angina pectoris  EKG 12 Lead   2. Chronic diastolic congestive heart failure (HCC)  EKG 12 Lead   3. Essential hypertension     4. Pure hypercholesterolemia     5. S/P cardiac cath 2014 at [facility]- patent LAD stent done 2006 and 40 to 50% lesions in other coronaries     6. S/P angioplasty with stent of the LAD 2014           Plan   The current  meds and labs reviewed       Continue the current treatment and with constant vigilance to changes in symptoms and also any potential side effects. Return for care or seek medical attention immediately if symptoms got worse and/or develop new symptoms. Coronary artery disease, seems to be stable. Denies angina or change in breathing pattern  hX OF lad STENT 2006  The cath report from [facility] reviewed and stable done 2014  Echo WNL  Cath 2019- no obstructive ds    Sinus Bradycardia  Rate 55  Stop toprol xl 12.5 po qd  Holter 48 hrs 1 week after off toprol xl 12.5 po qd     Hyperlipidemia: on statins, followed periodically. Patient need periodic lipid and liver profile.   Lipid panel and liver function test before next appointment asap    Congestive heart failure: no evidence of fluid overload today, no recent hospitalization for CHF   hx of Leg edema +2- now trace  Cont  aldactazide 25/25 1/2 tab po qd  BMP and Mg stable    Chronic low K intake prior to aldactazide   Cont kcl  K 4.1    Stable and doing well    Discussed use, benefit, and side effects of prescribed medications. All patient questions answered. Pt voiced understanding. Instructed to continue current medications, diet and exercise. Continue risk factor modification and medical management. Patient agreed with treatment plan. Follow up as directed.       RTC in 6 month    [facility]uresh NievesA[facility]

## 2021-06-22 RX ORDER — FOLIC ACID 1 MG/1
TABLET ORAL
Qty: 60 TABLET | Refills: 0 | Status: SHIPPED | OUTPATIENT
Start: 2021-06-22 | End: 2021-06-29

## 2021-06-28 DIAGNOSIS — I25.10 CORONARY ARTERY DISEASE INVOLVING NATIVE CORONARY ARTERY OF NATIVE HEART WITHOUT ANGINA PECTORIS: ICD-10-CM

## 2021-06-28 DIAGNOSIS — E78.00 PURE HYPERCHOLESTEROLEMIA: Chronic | ICD-10-CM

## 2021-06-28 DIAGNOSIS — D68.9 COAGULOPATHY (HCC): ICD-10-CM

## 2021-06-29 ENCOUNTER — HOSPITAL ENCOUNTER (OUTPATIENT)
Dept: NON INVASIVE DIAGNOSTICS | Age: 76
Discharge: HOME | End: 2021-06-29
Payer: MEDICARE

## 2021-06-29 DIAGNOSIS — R00.1 SINUS BRADYCARDIA: ICD-10-CM

## 2021-06-29 PROCEDURE — 93226 XTRNL ECG REC<48 HR SCAN A/R: CPT

## 2021-06-29 PROCEDURE — 93225 XTRNL ECG REC<48 HRS REC: CPT

## 2021-06-29 RX ORDER — METOPROLOL SUCCINATE 25 MG/1
TABLET, EXTENDED RELEASE ORAL
Qty: 45 TABLET | Refills: 0 | Status: SHIPPED | OUTPATIENT
Start: 2021-06-29 | End: 2021-08-03

## 2021-06-29 RX ORDER — FOLIC ACID 1 MG/1
TABLET ORAL
Qty: 60 TABLET | Refills: 0 | Status: SHIPPED | OUTPATIENT
Start: 2021-06-29 | End: 2021-10-05

## 2021-06-29 RX ORDER — PANTOPRAZOLE SODIUM 40 MG/1
TABLET, DELAYED RELEASE ORAL
Qty: 90 TABLET | Refills: 0 | Status: SHIPPED | OUTPATIENT
Start: 2021-06-29 | End: 2021-10-05

## 2021-06-29 RX ORDER — ATORVASTATIN CALCIUM 80 MG/1
TABLET, FILM COATED ORAL
Qty: 90 TABLET | Refills: 0 | Status: SHIPPED | OUTPATIENT
Start: 2021-06-29 | End: 2021-10-05

## 2021-07-04 DIAGNOSIS — I25.10 CORONARY ARTERY DISEASE INVOLVING NATIVE CORONARY ARTERY OF NATIVE HEART WITHOUT ANGINA PECTORIS: ICD-10-CM

## 2021-07-06 RX ORDER — POTASSIUM CHLORIDE 20 MEQ/1
TABLET, EXTENDED RELEASE ORAL
Qty: 90 TABLET | Refills: 1 | Status: SHIPPED | OUTPATIENT
Start: 2021-07-06 | End: 2021-12-30

## 2021-07-07 LAB
ACQUISITION DURATION: NORMAL S
AVERAGE HEART RATE: 73 BPM
HOOKUP DATE: NORMAL
HOOKUP TIME: NORMAL
MAX HEART RATE TIME/DATE: NORMAL
MAX HEART RATE: 118 BPM
MIN HEART RATE TIME/DATE: NORMAL
MIN HEART RATE: 50 BPM
NUMBER OF QRS COMPLEXES: NORMAL
NUMBER OF SUPRAVENTRICULAR COUPLETS: 297
NUMBER OF SUPRAVENTRICULAR ECTOPICS: NORMAL
NUMBER OF SUPRAVENTRICULAR ISOLATED BEATS: NORMAL
NUMBER OF VENTRICULAR BIGEMINAL CYCLES: 0
NUMBER OF VENTRICULAR COUPLETS: 0
NUMBER OF VENTRICULAR ECTOPICS: 175

## 2021-08-03 ENCOUNTER — OFFICE VISIT (OUTPATIENT)
Dept: FAMILY MEDICINE CLINIC | Age: 76
End: 2021-08-03

## 2021-08-03 VITALS
DIASTOLIC BLOOD PRESSURE: 72 MMHG | RESPIRATION RATE: 16 BRPM | HEART RATE: 64 BPM | TEMPERATURE: 96.9 F | WEIGHT: 232 LBS | HEIGHT: 70 IN | BODY MASS INDEX: 33.21 KG/M2 | SYSTOLIC BLOOD PRESSURE: 110 MMHG

## 2021-08-03 DIAGNOSIS — Z00.00 ROUTINE GENERAL MEDICAL EXAMINATION AT A HEALTH CARE FACILITY: Primary | ICD-10-CM

## 2021-08-03 DIAGNOSIS — E66.9 CLASS 1 OBESITY WITHOUT SERIOUS COMORBIDITY WITH BODY MASS INDEX (BMI) OF 34.0 TO 34.9 IN ADULT, UNSPECIFIED OBESITY TYPE: ICD-10-CM

## 2021-08-03 DIAGNOSIS — M54.50 CHRONIC MIDLINE LOW BACK PAIN WITHOUT SCIATICA: ICD-10-CM

## 2021-08-03 DIAGNOSIS — G47.33 OBSTRUCTIVE SLEEP APNEA SYNDROME: ICD-10-CM

## 2021-08-03 DIAGNOSIS — E78.00 PURE HYPERCHOLESTEROLEMIA: Chronic | ICD-10-CM

## 2021-08-03 DIAGNOSIS — I50.32 CHRONIC DIASTOLIC CONGESTIVE HEART FAILURE (HCC): ICD-10-CM

## 2021-08-03 DIAGNOSIS — Z95.820 S/P ANGIOPLASTY WITH STENT: ICD-10-CM

## 2021-08-03 DIAGNOSIS — R00.1 SINUS BRADYCARDIA: ICD-10-CM

## 2021-08-03 DIAGNOSIS — I25.10 CORONARY ARTERY DISEASE INVOLVING NATIVE CORONARY ARTERY OF NATIVE HEART WITHOUT ANGINA PECTORIS: ICD-10-CM

## 2021-08-03 DIAGNOSIS — K21.9 GASTROESOPHAGEAL REFLUX DISEASE WITHOUT ESOPHAGITIS: ICD-10-CM

## 2021-08-03 DIAGNOSIS — I10 ESSENTIAL HYPERTENSION: ICD-10-CM

## 2021-08-03 DIAGNOSIS — G89.29 CHRONIC MIDLINE LOW BACK PAIN WITHOUT SCIATICA: ICD-10-CM

## 2021-08-03 DIAGNOSIS — I27.82 OTHER CHRONIC PULMONARY EMBOLISM WITHOUT ACUTE COR PULMONALE (HCC): ICD-10-CM

## 2021-08-03 PROCEDURE — 99213 OFFICE O/P EST LOW 20 MIN: CPT | Performed by: FAMILY MEDICINE

## 2021-08-03 PROCEDURE — G0439 PPPS, SUBSEQ VISIT: HCPCS | Performed by: FAMILY MEDICINE

## 2021-08-03 ASSESSMENT — LIFESTYLE VARIABLES
HOW OFTEN DO YOU HAVE A DRINK CONTAINING ALCOHOL: 2
HOW OFTEN DO YOU HAVE SIX OR MORE DRINKS ON ONE OCCASION: 0
HOW OFTEN DURING THE LAST YEAR HAVE YOU BEEN UNABLE TO REMEMBER WHAT HAPPENED THE NIGHT BEFORE BECAUSE YOU HAD BEEN DRINKING: 0
HAS A RELATIVE, FRIEND, DOCTOR, OR ANOTHER HEALTH PROFESSIONAL EXPRESSED CONCERN ABOUT YOUR DRINKING OR SUGGESTED YOU CUT DOWN: 0
HAVE YOU OR SOMEONE ELSE BEEN INJURED AS A RESULT OF YOUR DRINKING: 0
HOW OFTEN DURING THE LAST YEAR HAVE YOU HAD A FEELING OF GUILT OR REMORSE AFTER DRINKING: 0
AUDIT-C TOTAL SCORE: 3
HOW OFTEN DURING THE LAST YEAR HAVE YOU FAILED TO DO WHAT WAS NORMALLY EXPECTED FROM YOU BECAUSE OF DRINKING: 0
AUDIT TOTAL SCORE: 3
HOW OFTEN DURING THE LAST YEAR HAVE YOU NEEDED AN ALCOHOLIC DRINK FIRST THING IN THE MORNING TO GET YOURSELF GOING AFTER A NIGHT OF HEAVY DRINKING: 0
HOW MANY STANDARD DRINKS CONTAINING ALCOHOL DO YOU HAVE ON A TYPICAL DAY: 1
HOW OFTEN DURING THE LAST YEAR HAVE YOU FOUND THAT YOU WERE NOT ABLE TO STOP DRINKING ONCE YOU HAD STARTED: 0

## 2021-08-03 ASSESSMENT — PATIENT HEALTH QUESTIONNAIRE - PHQ9
2. FEELING DOWN, DEPRESSED OR HOPELESS: 0
SUM OF ALL RESPONSES TO PHQ9 QUESTIONS 1 & 2: 0
1. LITTLE INTEREST OR PLEASURE IN DOING THINGS: 0
SUM OF ALL RESPONSES TO PHQ QUESTIONS 1-9: 0

## 2021-08-03 ASSESSMENT — ENCOUNTER SYMPTOMS
SHORTNESS OF BREATH: 0
CHEST TIGHTNESS: 0
COUGH: 0
SORE THROAT: 0
BACK PAIN: 0
ABDOMINAL PAIN: 0
TROUBLE SWALLOWING: 0
NAUSEA: 0
EYE PAIN: 0
CONSTIPATION: 0
BLOOD IN STOOL: 0

## 2021-08-03 NOTE — PROGRESS NOTES
Medicare Annual Wellness Visit  Name: Elma Olivas [id number] Date: 8/3/2021   MRN: [id number] Sex: Male   Age: 67 y.o. Ethnicity: Non- / Non    : 1945 Race: White (non-)      Elma Olivas is here for Medicare AWV    Screenings for behavioral, psychosocial and functional/safety risks, and cognitive dysfunction are all negative except as indicated below. These results, as well as other patient data from the [address] form, are documented in Flowsheets linked to this Encounter. No Known Allergies    Prior to Visit Medications    Medication Sig Taking? Authorizing Provider   potassium chloride (KLOR-CON M) 20 MEQ extended release tablet TAKE 1 TABLET BY MOUTH EVERY DAY  Yes Aditi Guzman MD   pantoprazole (PROTONIX) 40 MG tablet TAKE 1 TABLET BY MOUTH EVERY DAY  Yes Aditi Guzman MD   atorvastatin (LIPITOR) 80 MG tablet TAKE 1 TABLET BY MOUTH EVERY DAY  Yes Aditi Guzman MD   folic acid (FOLVITE) 1 MG tablet TAKE 1 TABLET BY MOUTH EVERY DAY (generic for folvite) Yes Aditi Guzman MD   spironolactone-hydroCHLOROthiazide (ALDACTAZIDE) 25-25 MG per tablet Take 0.5 tablets by mouth daily Take 0.5mg daily Yes Tanisha Hawley MD   tamsulosin (FLOMAX) 0.4 MG capsule Take 1 capsule by mouth daily Yes Aditi Guzman MD   tiZANidine (ZANAFLEX) 2 MG tablet Take 1 tablet by mouth 3 times daily as needed (BACK SPASM) Yes Aditi Guzman MD   warfarin (JANTOVEN) 5 MG tablet TAKE 1 TABLET BY MOUTH ONE TIME A DAY AS DIRECTED BY COUMADIN CLINIC Yes Christina Palma MD   ondansetron (ZOFRAN) 4 MG tablet 1 tablet every 6-8 hours for nausea vomiting Yes Serg Whittington MD   clotrimazole-betamethasone (LOTRISONE) 1-0.05 % cream Apply topically 2 times daily. Lotrisone 1-0.05% Cream Yes Aditi Guzman MD   nitroGLYCERIN (NITROSTAT) 0.4 MG SL tablet up to max of 3 total doses. If no relief after 1 dose, call 911.  Yes Tanisha Hawley MD   [employer]  Heart Disease Father        CareTeam (Including outside providers/suppliers regularly involved in providing care):   Patient Care Team:  Miya Momin MD as PCP - General (Family Medicine)  Miya Momin MD as PCP - [facility] Provider    Wt Readings from Last 3 Encounters:   08/03/21 232 lb (105.2 kg)   06/21/21 214 lb (97.1 kg)   04/29/21 236 lb (107 kg)     Vitals:    08/03/21 0938   BP: 110/72   Site: Right Upper Arm   Position: Sitting   Cuff Size: Medium Adult   Pulse: 64   Resp: 16   Temp: 96.9 °F (36.1 °C)   TempSrc: Skin   Weight: 232 lb (105.2 kg)   Height: 5' 10\" (1.778 m)     Body mass index is 33.29 kg/m². Based upon direct observation of the patient, evaluation of cognition reveals recent and remote memory intact. Patient's complete Health Risk Assessment and screening values have been reviewed and are found in Flowsheets. The following problems were reviewed today and where indicated follow up appointments were made and/or referrals ordered. Positive Risk Factor Screenings with Interventions:          General Health and ACP:  General  In general, how would you say your health is?: Good  In the past 7 days, have you experienced any of the following?  New or Increased Pain, New or Increased Fatigue, Loneliness, Social Isolation, Stress or Anger?: None of These  Do you get the social and emotional support that you need?: Yes  Do you have a Living Will?: Yes  Advance Directives     Power of [address] [facility] Will ACP-Advance Directive ACP-Power of     Not on File Not on File Not on File Not on File      General Health Risk Interventions:  ·   NEED  A WILL and  [address] Habits/Nutrition:  Health Habits/Nutrition  Do you exercise for at least 20 minutes 2-3 times per week?: (!) No  Have you lost any weight without trying in the past 3 months?: No  Do you eat only one meal per day?: No  Have you seen the dentist within the past year?: (!) No  Body mass index: (!) 33.28  Health Habits/Nutrition Interventions:  · Dental exam overdue:  patient encouraged to make appointment with his/her dentist    Hearing/Vision:  No exam data present  Hearing/Vision  Do you or your family notice any trouble with your hearing that hasn't been managed with hearing aids?: (!) Yes  Do you have difficulty driving, watching TV, or doing any of your daily activities because of your eyesight?: No  Have you had an eye exam within the past year?: Yes  Hearing/Vision Interventions:  ·   overall   stable     Safety:  Safety  Do you have working smoke detectors?: Yes  Have all throw rugs been removed or fastened?: (!) No  Do you have non-slip mats or surfaces in all bathtubs/showers?: Yes  Do all of your stairways have a railing or banister?: (!) No  Are your doorways, halls and stairs free of clutter?: Yes  Do you always fasten your seatbelt when you are in a car?: (!) No  Safety Interventions:  · Home safety tips provided     Personalized Preventive Plan   Current Health Maintenance Status  Immunization History   Administered Date(s) Administered    COVID-19, Monsalve Peter, PF, 30mcg/0.3mL 02/01/2021, 03/01/2021    Influenza 10/22/2013    Influenza Virus Vaccine 01/29/2001, 10/06/2015    Influenza, MDCK Quadv, with preserv IM (Flucelvax 4 yrs and older) 11/20/2019    Influenza, Eulice Sakshi, 6 mo and older, IM (Fluzone, Flulaval) 11/21/2017    Influenza, Quadv, IM, (6 mo and older Fluzone, Flulaval, Fluarix and 3 yrs and older Afluria) 09/29/2016, 10/27/2020    Pneumococcal Conjugate 13-valent ([id number]) 10/06/2015    Pneumococcal Polysaccharide ([id number]) 08/17/2011, 10/12/2014        Health Maintenance   Topic Date Due    Hepatitis C screen  Never done    DTaP/Tdap/Td vaccine (1 - Tdap) Never done    Shingles Vaccine (1 of 2) Never done   [employer] Visit (AWV)  Never done    Flu vaccine (1) 09/01/2021    Lipid screen  02/12/2022    Potassium monitoring  02/12/2022    Creatinine monitoring  2022    PSA counseling  2022    Colon cancer screen colonoscopy  10/07/2025    Pneumococcal 65+ years Vaccine  Completed    COVID-19 Vaccine  Completed    Hepatitis A vaccine  Aged Out    Hepatitis B vaccine  Aged Out    Hib vaccine  Aged Out    Meningococcal (ACWY) vaccine  Aged Out     Recommendations for [place] Due: see orders and patient instructions/AVS.  . Recommended screening schedule for the next 5-10 years is provided to the patient in written form: see Patient Instructions/AVS.    There are no diagnoses linked to this encounter. John Franco (:  1945) is a 67 y.o. male,Established patient, here for evaluation of the following chief complaint(s):  Medicare AW         ASSESSMENT/     Diagnosis Orders   1. Coronary artery disease involving native coronary artery of native heart without angina pectoris     2. Pure hypercholesterolemia     3. Class 1 obesity without serious comorbidity with body mass index (BMI) of 34.0 to 34.9 in adult, unspecified obesity type     4. Gastroesophageal reflux disease without esophagitis     5. Obstructive sleep apnea syndrome     6. Other chronic pulmonary embolism without acute cor pulmonale (HCC)     7. Chronic diastolic congestive heart failure ([address]     8. Chronic midline low back pain without sciatica     9. Essential hypertension     10. Sinus bradycardia     11.  S/P angioplasty with stent of the LAD              PLAN:    Current Outpatient Medications   Medication Sig Dispense Refill    potassium chloride (KLOR-CON M) 20 MEQ extended release tablet TAKE 1 TABLET BY MOUTH EVERY DAY  90 tablet 1    pantoprazole (PROTONIX) 40 MG tablet TAKE 1 TABLET BY MOUTH EVERY DAY  90 tablet 0    atorvastatin (LIPITOR) 80 MG tablet TAKE 1 TABLET BY MOUTH EVERY DAY  90 tablet 0    folic acid (FOLVITE) 1 MG tablet TAKE 1 TABLET BY MOUTH EVERY DAY (generic for folvite) 60 tablet 0    spironolactone-hydroCHLOROthiazide (ALDACTAZIDE) 25-25 MG per tablet Take 0.5 tablets by mouth daily Take 0.5mg daily 45 tablet 3    tamsulosin (FLOMAX) 0.4 MG capsule Take 1 capsule by mouth daily 30 capsule 0    tiZANidine (ZANAFLEX) 2 MG tablet Take 1 tablet by mouth 3 times daily as needed (BACK SPASM) 30 tablet 0    warfarin (JANTOVEN) 5 MG tablet TAKE 1 TABLET BY MOUTH ONE TIME A DAY AS DIRECTED BY COUMADIN CLINIC 30 tablet 0    ondansetron (ZOFRAN) 4 MG tablet 1 tablet every 6-8 hours for nausea vomiting 12 tablet 0    clotrimazole-betamethasone (LOTRISONE) 1-0.05 % cream Apply topically 2 times daily. Lotrisone 1-0.05% Cream 45 g 3    nitroGLYCERIN (NITROSTAT) 0.4 MG SL tablet up to max of 3 total doses. If no relief after 1 dose, call 911. 25 tablet 3    Ascorbic Acid (VITAMIN C) 1000 MG tablet Take 1,000 mg by mouth daily      Multiple Vitamins-Minerals (MULTI COMPLETE PO) Take 1 tablet by mouth daily      Omega-3 Fatty Acids (FISH OIL) 1000 MG CAPS Take 3,000 mg by mouth daily      aspirin 81 MG EC tablet Take 81 mg by mouth daily. No current facility-administered medications for this visit. No follow-ups on file. No orders of the defined types were placed in this encounter. Subjective   SUBJECTIVE/OBJECTIVE:  HPI         recurrent  pe  Stable      gerd  Stable      ashd  Stable      bph   staBLE      LIPIDS  STABLE       BACK  PAIN  STABLE     LEFT  HIP  STABLE POST  OP     Review of Systems   Constitutional: Negative for fatigue and fever. HENT: Negative for congestion, ear pain, postnasal drip, sore throat and trouble swallowing. Eyes: Negative for pain. Respiratory: Negative for cough, chest tightness and shortness of breath. Cardiovascular: Negative for chest pain, palpitations and leg swelling. Gastrointestinal: Negative for abdominal pain, blood in stool, constipation and nausea. Genitourinary: Negative for difficulty urinating, frequency and urgency. Musculoskeletal: Negative for arthralgias, back pain, joint swelling and neck stiffness. Skin: Negative for rash. Neurological: Negative for dizziness, weakness and headaches. Hematological: Negative for adenopathy. Does not bruise/bleed easily. Psychiatric/Behavioral: Negative for behavioral problems, dysphoric mood and sleep disturbance. Objective   /72 (Site: Right Upper Arm, Position: Sitting, Cuff Size: Medium Adult)   Pulse 64   Temp 96.9 °F (36.1 °C) (Skin)   Resp 16   Ht 5' 10\" (1.778 m)   Wt 232 lb (105.2 kg)   BMI 33.29 kg/m²   Physical Exam  Vitals and nursing note reviewed. Constitutional:       Appearance: He is well-developed. HENT:      Head: Normocephalic and atraumatic. Right Ear: External ear normal.      Left Ear: External ear normal.      Nose: Nose normal.   Eyes:      Conjunctiva/sclera: Conjunctivae normal.      Pupils: Pupils are equal, round, and reactive to light. Comments: Fundi nl   Neck:      Thyroid: No thyromegaly. Cardiovascular:      Rate and Rhythm: Normal rate and regular rhythm. Heart sounds: Normal heart sounds. Pulmonary:      Effort: Pulmonary effort is normal.      Breath sounds: Normal breath sounds. No wheezing or rales. Abdominal:      General: Bowel sounds are normal.      Palpations: Abdomen is soft. There is no mass. Tenderness: There is no abdominal tenderness. Musculoskeletal:         General: Normal range of motion. Cervical back: Normal range of motion and neck supple. Lymphadenopathy:      Cervical: No cervical adenopathy. Skin:     General: Skin is warm and dry. Findings: No rash. Neurological:      Mental Status: He is alert and oriented to person, place, and time. Cranial Nerves: No cranial nerve deficit. Deep Tendon Reflexes: Reflexes are normal and symmetric. An electronic signature was used to authenticate this note.     --Berkley Small MD

## 2021-08-03 NOTE — PATIENT INSTRUCTIONS
Personalized Preventive Plan for Lamine Franco - 8/3/2021  Medicare offers a range of preventive health benefits. Some of the tests and screenings are paid in full while other may be subject to a deductible, co-insurance, and/or copay. Some of these benefits include a comprehensive review of your medical history including lifestyle, illnesses that may run in your family, and various assessments and screenings as appropriate. After reviewing your medical record and screening and assessments performed today your provider may have ordered immunizations, labs, imaging, and/or referrals for you. A list of these orders (if applicable) as well as your Preventive Care list are included within your After Visit Summary for your review. Other Preventive Recommendations:    · A preventive eye exam performed by an eye specialist is recommended every 1-2 years to screen for glaucoma; cataracts, macular degeneration, and other eye disorders. · A preventive dental visit is recommended every 6 months. · Try to get at least 150 minutes of exercise per week or 10,000 steps per day on a pedometer . · Order or download the FREE \"Exercise & Physical Activity: Your Everyday Guide\" from [employer]. Call 4-698.909.3747 or search [employer] online. · You need 5434-1620 mg of calcium and 7822-3939 IU of vitamin D per day. It is possible to meet your calcium requirement with diet alone, but a vitamin D supplement is usually necessary to meet this goal.  · When exposed to the sun, use a sunscreen that protects against both UVA and UVB radiation with an SPF of 30 or greater. Reapply every 2 to 3 hours or after sweating, drying off with a towel, or swimming. · Always wear a seat belt when traveling in a car. Always wear a helmet when riding a bicycle or motorcycle.

## 2021-10-03 DIAGNOSIS — E78.00 PURE HYPERCHOLESTEROLEMIA: Chronic | ICD-10-CM

## 2021-10-03 DIAGNOSIS — I25.10 CORONARY ARTERY DISEASE INVOLVING NATIVE CORONARY ARTERY OF NATIVE HEART WITHOUT ANGINA PECTORIS: ICD-10-CM

## 2021-10-03 DIAGNOSIS — D68.9 COAGULOPATHY (HCC): ICD-10-CM

## 2021-10-04 NOTE — TELEPHONE ENCOUNTER
Date of last visit:  8/3/2021  Date of next visit:  11/5/2021    Requested Prescriptions     Pending Prescriptions Disp Refills    atorvastatin (LIPITOR) 80 MG tablet [Pharmacy Med Name: Atorvastatin Calcium Oral Tablet 80 MG] 90 tablet 0     Sig: TAKE 1 TABLET BY MOUTH EVERY DAY    metoprolol succinate (TOPROL XL) 25 MG extended release tablet [Pharmacy Med Name: Metoprolol Succinate ER Oral Tablet Extended Release 24 Hour 25 MG] 45 tablet 0     Sig: TAKE 1/2 TABLET BY MOUTH ONE TIME A DAY    folic acid (FOLVITE) 1 MG tablet [Pharmacy Med Name: Folic Acid Oral Tablet 1 MG] 60 tablet 0     Sig: TAKE 1 TABLET BY MOUTH EVERY DAY    pantoprazole (PROTONIX) 40 MG tablet [Pharmacy Med Name: Pantoprazole Sodium Oral Tablet Delayed Release 40 MG] 90 tablet 0     Sig: TAKE 1 TABLET BY MOUTH EVERY DAY

## 2021-10-05 RX ORDER — PANTOPRAZOLE SODIUM 40 MG/1
TABLET, DELAYED RELEASE ORAL
Qty: 90 TABLET | Refills: 0 | Status: SHIPPED | OUTPATIENT
Start: 2021-10-05 | End: 2021-12-30

## 2021-10-05 RX ORDER — ATORVASTATIN CALCIUM 80 MG/1
TABLET, FILM COATED ORAL
Qty: 90 TABLET | Refills: 0 | Status: SHIPPED | OUTPATIENT
Start: 2021-10-05 | End: 2021-12-30

## 2021-10-05 RX ORDER — METOPROLOL SUCCINATE 25 MG/1
TABLET, EXTENDED RELEASE ORAL
Qty: 45 TABLET | Refills: 0 | Status: SHIPPED | OUTPATIENT
Start: 2021-10-05 | End: 2021-12-30

## 2021-10-05 RX ORDER — FOLIC ACID 1 MG/1
TABLET ORAL
Qty: 60 TABLET | Refills: 0 | Status: SHIPPED | OUTPATIENT
Start: 2021-10-05 | End: 2022-01-06

## 2021-11-05 ENCOUNTER — OFFICE VISIT (OUTPATIENT)
Dept: FAMILY MEDICINE CLINIC | Age: 76
End: 2021-11-05

## 2021-11-05 VITALS
HEIGHT: 70 IN | WEIGHT: 237.13 LBS | DIASTOLIC BLOOD PRESSURE: 68 MMHG | SYSTOLIC BLOOD PRESSURE: 122 MMHG | RESPIRATION RATE: 10 BRPM | HEART RATE: 56 BPM | BODY MASS INDEX: 33.95 KG/M2 | TEMPERATURE: 96.1 F

## 2021-11-05 DIAGNOSIS — M54.50 CHRONIC MIDLINE LOW BACK PAIN WITHOUT SCIATICA: ICD-10-CM

## 2021-11-05 DIAGNOSIS — G89.29 CHRONIC MIDLINE LOW BACK PAIN WITHOUT SCIATICA: ICD-10-CM

## 2021-11-05 DIAGNOSIS — K21.9 GASTROESOPHAGEAL REFLUX DISEASE WITHOUT ESOPHAGITIS: ICD-10-CM

## 2021-11-05 DIAGNOSIS — I10 ESSENTIAL HYPERTENSION: Primary | ICD-10-CM

## 2021-11-05 DIAGNOSIS — K57.30 DIVERTICULOSIS OF COLON: ICD-10-CM

## 2021-11-05 DIAGNOSIS — G47.33 OBSTRUCTIVE SLEEP APNEA SYNDROME: ICD-10-CM

## 2021-11-05 DIAGNOSIS — Z23 NEED FOR INFLUENZA VACCINATION: ICD-10-CM

## 2021-11-05 DIAGNOSIS — E78.00 PURE HYPERCHOLESTEROLEMIA: Chronic | ICD-10-CM

## 2021-11-05 DIAGNOSIS — I50.32 CHRONIC DIASTOLIC CONGESTIVE HEART FAILURE (HCC): ICD-10-CM

## 2021-11-05 DIAGNOSIS — I25.10 CORONARY ARTERY DISEASE INVOLVING NATIVE CORONARY ARTERY OF NATIVE HEART WITHOUT ANGINA PECTORIS: ICD-10-CM

## 2021-11-05 PROCEDURE — 99213 OFFICE O/P EST LOW 20 MIN: CPT | Performed by: FAMILY MEDICINE

## 2021-11-05 PROCEDURE — G0008 ADMIN INFLUENZA VIRUS VAC: HCPCS | Performed by: FAMILY MEDICINE

## 2021-11-05 PROCEDURE — 90756 CCIIV4 VACC ABX FREE IM: CPT | Performed by: FAMILY MEDICINE

## 2021-11-05 ASSESSMENT — ENCOUNTER SYMPTOMS
EYE PAIN: 0
NAUSEA: 0
SORE THROAT: 0
CONSTIPATION: 0
BACK PAIN: 0
ORTHOPNEA: 0
WHEEZING: 0
CHEST TIGHTNESS: 0
SHORTNESS OF BREATH: 0
ABDOMINAL PAIN: 0
BLOOD IN STOOL: 0
COUGH: 0
TROUBLE SWALLOWING: 0

## 2021-11-05 NOTE — PROGRESS NOTES
Subjective:      Patient ID: Sp Rico is a 67 y.o. male. ashd   Stable       No  dvt  Or  pe   pain         Hypertension  This is a chronic problem. The current episode started more than 1 year ago. The problem has been resolved since onset. The problem is controlled. Pertinent negatives include no chest pain, headaches, orthopnea, palpitations, peripheral edema or shortness of breath. The current treatment provides significant improvement. There are no compliance problems. Gastroesophageal Reflux  He reports no abdominal pain, no chest pain, no coughing, no early satiety, no nausea, no sore throat or no wheezing. This is a chronic problem. The current episode started more than 1 year ago. The problem occurs rarely. The problem has been resolved. The symptoms are aggravated by certain foods. Pertinent negatives include no fatigue. He has tried a PPI for the symptoms. The treatment provided significant relief. Hyperlipidemia  This is a chronic problem. The current episode started more than 1 year ago. The problem is controlled. Recent lipid tests were reviewed and are normal. Pertinent negatives include no chest pain, focal weakness, leg pain or shortness of breath. Current antihyperlipidemic treatment includes statins. The current treatment provides significant improvement of lipids. There are no compliance problems.       Past Medical History:   Diagnosis Date    CAD (coronary artery disease) 01/2006    cath with stent lad    Chronic back pain 2013      epidural block    Colon polyps 10/2005    Diverticulosis of colon     DVT of proximal leg (deep vein thrombosis) ([address] 12/04 2013    left     right  in 2013    DVT of proximal leg (deep vein thrombosis) (HCC)     left     GERD (gastroesophageal reflux disease)     Kidney stone on left side 12/2020    left  side  in  2009  also    Pulmonary embolus ([address] 6-2013 and  1-2015     had   right  leg  dvt    S[facility]- patent LAD stent done 2006 and 40 to 50% lesions in other coronaries 04/12/2019    S/P cardiac cath 4/17/19- large all ectatic vessel, lm-p, lad prox 40%, mid 50%, distal 40 distal stent -patent, lcx prox 40%, rca prox 30%, dist 40%, edp 11, ef 50%- med  RX 04/17/2019    SOB (shortness of breath) on exertion 06/08/2020    Unspecified sleep apnea 2013    cpap     Review of Systems   Constitutional: Negative for fatigue and fever. HENT: Negative for congestion, ear pain, postnasal drip, sore throat and trouble swallowing. Eyes: Negative for pain. Respiratory: Negative for cough, chest tightness, shortness of breath and wheezing. Cardiovascular: Negative for chest pain, palpitations, orthopnea and leg swelling. Gastrointestinal: Negative for abdominal pain, blood in stool, constipation and nausea. Genitourinary: Negative for difficulty urinating, frequency and urgency. Musculoskeletal: Negative for arthralgias, back pain, joint swelling and neck stiffness. Skin: Negative for rash. Neurological: Negative for dizziness, focal weakness, weakness and headaches. Hematological: Negative for adenopathy. Does not bruise/bleed easily. Psychiatric/Behavioral: Negative for behavioral problems, dysphoric mood and sleep disturbance. /68 (Site: Right Upper Arm, Position: Sitting, Cuff Size: Medium Adult)   Pulse 56   Temp 96.1 °F (35.6 °C) (Infrared)   Resp 10   Ht 5' 10\" (1.778 m)   Wt 237 lb 2 oz (107.6 kg)   BMI 34.02 kg/m²   Objective:   Physical Exam  Vitals and nursing note reviewed. Constitutional:       Appearance: He is well-developed. HENT:      Head: Normocephalic and atraumatic. Right Ear: External ear normal.      Left Ear: External ear normal.      Nose: Nose normal.   Eyes:      Conjunctiva/sclera: Conjunctivae normal.      Pupils: Pupils are equal, round, and reactive to light. Comments: Fundi nl   Neck:      Thyroid: No thyromegaly.    Cardiovascular:      Rate and Rhythm: Normal rate and regular rhythm. Heart sounds: Normal heart sounds. Pulmonary:      Effort: Pulmonary effort is normal.      Breath sounds: Normal breath sounds. No wheezing or rales. Abdominal:      General: Bowel sounds are normal.      Palpations: Abdomen is soft. There is no mass. Tenderness: There is no abdominal tenderness. Musculoskeletal:         General: Normal range of motion. Cervical back: Normal range of motion and neck supple. Lymphadenopathy:      Cervical: No cervical adenopathy. Skin:     General: Skin is warm and dry. Findings: No rash. Neurological:      Mental Status: He is alert and oriented to person, place, and time. Cranial Nerves: No cranial nerve deficit. Deep Tendon Reflexes: Reflexes are normal and symmetric. Assessment:       Diagnosis Orders   1. Essential hypertension     2. Need for influenza vaccination  INFLUENZA, MDCK QUADV, 2 YRS AND OLDER, IM, MDV, 0.5ML (FLUCELVAX QUADV)   3. Pure hypercholesterolemia     4. Coronary artery disease involving native coronary artery of native heart without angina pectoris     5. Chronic midline low back pain without sciatica     6. Chronic diastolic congestive heart failure ([address]     7. Gastroesophageal reflux disease without esophagitis     8. Diverticulosis of colon     9.  Obstructive sleep apnea syndrome           Plan:      Current Outpatient Medications   Medication Sig Dispense Refill    atorvastatin (LIPITOR) 80 MG tablet TAKE 1 TABLET BY MOUTH EVERY DAY  90 tablet 0    metoprolol succinate (TOPROL XL) 25 MG extended release tablet TAKE 1/2 TABLET BY MOUTH ONE TIME A DAY  45 tablet 0    folic acid (FOLVITE) 1 MG tablet TAKE 1 TABLET BY MOUTH EVERY DAY  60 tablet 0    pantoprazole (PROTONIX) 40 MG tablet TAKE 1 TABLET BY MOUTH EVERY DAY  90 tablet 0    potassium chloride (KLOR-CON M) 20 MEQ extended release tablet TAKE 1 TABLET BY MOUTH EVERY DAY  90 tablet 1    spironolactone-hydroCHLOROthiazide (ALDACTAZIDE) 25-25 MG per tablet Take 0.5 tablets by mouth daily Take 0.5mg daily 45 tablet 3    tamsulosin (FLOMAX) 0.4 MG capsule Take 1 capsule by mouth daily 30 capsule 0    tiZANidine (ZANAFLEX) 2 MG tablet Take 1 tablet by mouth 3 times daily as needed (BACK SPASM) 30 tablet 0    warfarin (JANTOVEN) 5 MG tablet TAKE 1 TABLET BY MOUTH ONE TIME A DAY AS DIRECTED BY COUMADIN CLINIC 30 tablet 0    ondansetron (ZOFRAN) 4 MG tablet 1 tablet every 6-8 hours for nausea vomiting 12 tablet 0    clotrimazole-betamethasone (LOTRISONE) 1-0.05 % cream Apply topically 2 times daily. Lotrisone 1-0.05% Cream 45 g 3    nitroGLYCERIN (NITROSTAT) 0.4 MG SL tablet up to max of 3 total doses. If no relief after 1 dose, call 911. 25 tablet 3    Ascorbic Acid (VITAMIN C) 1000 MG tablet Take 1,000 mg by mouth daily      Multiple Vitamins-Minerals (MULTI COMPLETE PO) Take 1 tablet by mouth daily      Omega-3 Fatty Acids (FISH OIL) 1000 MG CAPS Take 3,000 mg by mouth daily      aspirin 81 MG EC tablet Take 81 mg by mouth daily. No current facility-administered medications for this visit.          Orders Placed This Encounter   Procedures    INFLUENZA, MDCK QUADV, 2 YRS AND OLDER, IM, MDV, 0.5ML ([place]ing[place] Rodríguez)         See in  3  mths     Francine Huston MD

## 2021-11-05 NOTE — PROGRESS NOTES
Immunizations Administered     Name Date Dose Route    Influenza, [facility] 48 Quadv, with preserv IM (Flucelvax 2 yrs and older) 11/5/2021 0.5 mL Intramuscular    Site: Deltoid- Right    Lot: [id number]    NDC: 86477-447-45

## 2021-12-02 ENCOUNTER — TELEPHONE (OUTPATIENT)
Dept: FAMILY MEDICINE CLINIC | Age: 76
End: 2021-12-02

## 2021-12-02 RX ORDER — CEFDINIR 300 MG/1
300 CAPSULE ORAL 2 TIMES DAILY
Qty: 20 CAPSULE | Refills: 0 | Status: SHIPPED | OUTPATIENT
Start: 2021-12-02 | End: 2021-12-12

## 2021-12-02 NOTE — TELEPHONE ENCOUNTER
Date of last visit:  11/5/2021  Date of next visit:  2/11/2022    Requested Prescriptions     Signed Prescriptions Disp Refills    cefdinir (OMNICEF) 300 MG capsule 20 capsule 0     Sig: Take 1 capsule by mouth 2 times daily for 10 days     Authorizing Provider: Patricia Killian     Ordering User: Enrique Henao

## 2021-12-02 NOTE — TELEPHONE ENCOUNTER
Patient called C/O dry cough and aching bones and joints. Unsure if he is running a fever but doesn't seem like it. He is requesting an RX to [facility].     Please call him [phone]

## 2021-12-14 ENCOUNTER — OFFICE VISIT (OUTPATIENT)
Dept: CARDIOLOGY CLINIC | Age: 76
End: 2021-12-14
Payer: MEDICARE

## 2021-12-14 VITALS
DIASTOLIC BLOOD PRESSURE: 72 MMHG | HEART RATE: 53 BPM | SYSTOLIC BLOOD PRESSURE: 118 MMHG | WEIGHT: 234.6 LBS | HEIGHT: 70 IN | BODY MASS INDEX: 33.58 KG/M2

## 2021-12-14 DIAGNOSIS — I50.32 CHRONIC DIASTOLIC CONGESTIVE HEART FAILURE (HCC): Primary | ICD-10-CM

## 2021-12-14 DIAGNOSIS — E78.00 PURE HYPERCHOLESTEROLEMIA: Chronic | ICD-10-CM

## 2021-12-14 DIAGNOSIS — Z98.890 S/P CARDIAC CATH: ICD-10-CM

## 2021-12-14 DIAGNOSIS — I25.10 CORONARY ARTERY DISEASE INVOLVING NATIVE CORONARY ARTERY OF NATIVE HEART WITHOUT ANGINA PECTORIS: ICD-10-CM

## 2021-12-14 DIAGNOSIS — Z95.820 S/P ANGIOPLASTY WITH STENT: ICD-10-CM

## 2021-12-14 DIAGNOSIS — I10 ESSENTIAL HYPERTENSION: ICD-10-CM

## 2021-12-14 DIAGNOSIS — R00.1 SINUS BRADYCARDIA: ICD-10-CM

## 2021-12-14 PROCEDURE — 99214 OFFICE O/P EST MOD 30 MIN: CPT | Performed by: INTERNAL MEDICINE

## 2021-12-14 NOTE — PROGRESS NOTES
Chief Complaint   Patient presents with    6 Month Follow-Up    Coronary Artery Disease   Former pat of Dr. Luis F Smith  Hx of CAD s/p stent 8yrs back        6 month follow up. EKG done 6-.     Leg edema better after aldactazide    Sob on exertion - chronic-unchanged    Denied Chest pain , palpitations or  dizziness    Patient Active Problem List   Diagnosis    CAD (coronary artery disease)    Diverticulosis of colon    Hyperlipidemia    Obesity    GERD (gastroesophageal reflux disease)    Colon polyps    Pulmonary embolus (HCC)    Sleep apnea    Chronic back pain    Chronic diastolic congestive heart failure ([address]    S/P cardiac cath 2014 at [facility]- patent LAD stent done 2006 and 40 to 50% lesions in other coronaries    S/P angioplasty with stent of the LAD 2014    Essential hypertension    Sinus bradycardia       Past Surgical History:   Procedure Laterality Date    BACK SURGERY  5-    Dr Joce Aggarwal @ [facility])    [address]  2014       50%  lesion    bamdad    CARDIAC CATHETERIZATION  04/2019      dr Geiger Salvage  and  diffuse  disease  30 to 50%    COLONOSCOPY  2012 2020    sheik    polyps   due   2025    CORONARY ANGIOPLASTY WITH STENT PLACEMENT  2006    LAD    EYE SURGERY  2011    bilateral    HERNIA REPAIR  14/57    umbilical    JOINT REPLACEMENT Right 04/2019     [place]  right  hip    VENA CAVA FILTER PLACEMENT  2016    placed  iin  1-2014  and  removed  5-2016       No Known Allergies     Family History   Problem Relation Age of Onset    Heart Disease Father         Social History     Socioeconomic History    Marital status:      Spouse name: Not on file    Number of children: Not on file    Years of education: Not on file    Highest education level: Not on file   Occupational History    Not on file   Tobacco Use    Smoking status: Never Smoker    Smokeless tobacco: Never Used   Vaping Use    Vaping Use: Never used Substance and Sexual Activity    Alcohol use: Yes     Alcohol/week: 3.3 standard drinks     Types: 4 Standard drinks or equivalent per week    Drug use: No    Sexual activity: Yes     Partners: Female   Other Topics Concern    Not on file   Social History Narrative    Not on file     Social Determinants of Health     Financial Resource Strain: Low Risk     Difficulty of Paying Living Expenses: Not hard at all   Food Insecurity: No Food Insecurity    Worried About [address] in the Last Year: Never true    [address] in the Last Year: Never true   Transportation Needs:     Lack of Transportation (Medical): Not on file    Lack of Transportation (Non-Medical):  Not on file   Physical Activity:     Days of Exercise per Week: Not on file    Minutes of Exercise per Session: Not on file   Stress:     Feeling of Stress : Not on file   Social Connections:     Frequency of Communication with Friends and Family: Not on file    Frequency of Social Gatherings with Friends and Family: Not on file    Attends Gnosticism Services: Not on file    Active Member of [address] [place] [address] or Organizations: Not on file    Attends Club or Organization Meetings: Not on file    Marital Status: Not on file   Intimate Partner Violence:     Fear of Current or Ex-Partner: Not on file    Emotionally Abused: Not on file    Physically Abused: Not on file    Sexually Abused: Not on file   Housing Stability:     Unable to Pay for Housing in the Last Year: Not on file    Number of [place] in the Last Year: Not on file    Unstable Housing in the Last Year: Not on file       Current Outpatient Medications   Medication Sig Dispense Refill    atorvastatin (LIPITOR) 80 MG tablet TAKE 1 TABLET BY MOUTH EVERY DAY  90 tablet 0    metoprolol succinate (TOPROL XL) 25 MG extended release tablet TAKE 1/2 TABLET BY MOUTH ONE TIME A DAY  45 tablet 0    folic acid (FOLVITE) 1 MG tablet TAKE 1 TABLET BY MOUTH EVERY DAY  60 tablet 0    pantoprazole (PROTONIX) 40 MG tablet TAKE 1 TABLET BY MOUTH EVERY DAY  90 tablet 0    potassium chloride (KLOR-CON M) 20 MEQ extended release tablet TAKE 1 TABLET BY MOUTH EVERY DAY  90 tablet 1    spironolactone-hydroCHLOROthiazide (ALDACTAZIDE) 25-25 MG per tablet Take 0.5 tablets by mouth daily Take 0.5mg daily 45 tablet 3    tamsulosin (FLOMAX) 0.4 MG capsule Take 1 capsule by mouth daily 30 capsule 0    tiZANidine (ZANAFLEX) 2 MG tablet Take 1 tablet by mouth 3 times daily as needed (BACK SPASM) 30 tablet 0    warfarin (JANTOVEN) 5 MG tablet TAKE 1 TABLET BY MOUTH ONE TIME A DAY AS DIRECTED BY COUMADIN CLINIC 30 tablet 0    ondansetron (ZOFRAN) 4 MG tablet 1 tablet every 6-8 hours for nausea vomiting 12 tablet 0    clotrimazole-betamethasone (LOTRISONE) 1-0.05 % cream Apply topically 2 times daily. Lotrisone 1-0.05% Cream 45 g 3    nitroGLYCERIN (NITROSTAT) 0.4 MG SL tablet up to max of 3 total doses. If no relief after 1 dose, call 911. 25 tablet 3    Ascorbic Acid (VITAMIN C) 1000 MG tablet Take 1,000 mg by mouth daily      Multiple Vitamins-Minerals (MULTI COMPLETE PO) Take 1 tablet by mouth daily      Omega-3 Fatty Acids (FISH OIL) 1000 MG CAPS Take 3,000 mg by mouth daily      aspirin 81 MG EC tablet Take 81 mg by mouth daily. No current facility-administered medications for this visit. Review of Systems -     General ROS: negative  Psychological ROS: negative  Hematological and Lymphatic ROS: No history of blood clots or bleeding disorder. Respiratory ROS: no cough,  or wheezing, the rest see HPI  Cardiovascular ROS: See HPI  Gastrointestinal ROS: negative  Genito-Urinary ROS: no dysuria, trouble voiding, or hematuria  Musculoskeletal ROS: negative  Neurological ROS: no TIA or stroke symptoms  Dermatological ROS: negative      Blood pressure 118/72, pulse 53, height 5' 10\" (1.778 m), weight 234 lb 9.6 oz (106.4 kg).         Physical Examination:    General appearance - alert, well appearing, and in no distress  HEENT- Pink conjunctiva  , Non-icteri sclera,PERRLA  Mental status - alert, oriented to person, place, and time  Neck - supple, no significant adenopathy, no JVD, or carotid bruits  Chest - clear to auscultation, no wheezes, rales or rhonchi, symmetric air entry  Heart - normal rate, regular rhythm, normal S1, S2, no murmurs, rubs, clicks or gallops  Abdomen - soft, nontender, nondistended, no masses or organomegaly  MIN- no CVA or flank tenderness, no suprapubic tenderness  Neurological - alert, oriented, normal speech, no focal findings or movement disorder noted  Musculoskeletal/limbs - no joint tenderness, deformity or swelling   - peripheral pulses normal, no pedal edema, no clubbing or cyanosis  Skin - normal coloration and turgor, no rashes, no suspicious skin lesions noted  Psych- appropriate mood and affect    Lab  No results for input(s): CKTOTAL, CKMB, CKMBINDEX, TROPONINI in the last 72 hours.   CBC:   Lab Results   Component Value Date    WBC 7.8 02/12/2021    WBC 9.4 12/06/2020    RBC 4.75 02/12/2021    HGB 14.9 02/12/2021    HCT 43.4 02/12/2021    MCV 91 02/12/2021    MCH 31.4 02/12/2021    MCHC 34.3 02/12/2021    RDW 14.0 02/12/2021     02/12/2021     12/06/2020    MPV 8.7 02/12/2021     BMP:    Lab Results   Component Value Date     02/12/2021    K 4.1 02/12/2021    K 4.1 04/17/2019     02/12/2021    CO2 29 02/12/2021    BUN 20 02/12/2021    LABALBU 4.2 02/12/2021    CREATININE 0.94 02/12/2021    CALCIUM 9.7 02/12/2021    LABGLOM 82 12/06/2020    GLUCOSE 103 02/12/2021     Hepatic Function Panel:    Lab Results   Component Value Date    ALKPHOS 62 02/12/2021    ALKPHOS 74 12/06/2020    ALT 31 02/12/2021    AST 34 02/12/2021    PROT 6.8 02/12/2021    BILITOT 1.3 02/12/2021    BILIDIR 0.3 02/12/2021    LABALBU 4.2 02/12/2021     Magnesium:    Lab Results   Component Value Date    MG 2.0 02/12/2021     Warfarin PT/INR:  No components found for: Manual Bc  HgBA1c:  No results found for: LABA1C  FLP:    Lab Results   Component Value Date    TRIG 104 02/12/2021    HDL 48 02/12/2021    LDLCALC 81 02/12/2021    LABVLDL 21 02/12/2021     TSH:    Lab Results   Component Value Date    TSH 1.79 02/12/2021       EKG 12/19/18  NSR No acute ekg abn    Cardiac cath 02/2014   Mild to mod CAD 40 to 50% stenosis and LAD stent done 2006 was patent    Conclusions      Summary   Normal left ventricle size and Low Normal LV systolic function.   Ejection fraction was estimated at 50 to 55 %.   There were no regional left ventricular wall motion abnormalities and wall   thickness was within normal limits.   Doppler parameters were consistent with abnormal left ventricular   relaxation (grade 1 diastolic dysfunction).   The left atrium is Mildly dilated.   Mildly enlarged right atrium size.   Mildly enlarged right ventricle cavity.   Right ventricle global systolic function is normal .      Signature      ----------------------------------------------------------------   Electronically signed by Rubén Noyola MD (Interpreting   physician) on 02/04/2019 at 06:02 PM    Conclusions      Summary   No ischemic EKG changes.   Nonspecific ST-T wave changes.   The T.I.D. ratio was 1.44 . ( Visually noted as well)   The nuclear images is suggestive for mild myocardial ischemia in the   inferior wall.      Recommendation   Clinical correlation is recommended.      Signatures      ----------------------------------------------------------------   Electronically signed by Rubén Noyola MD (Interpreting   Cardiologist) on 04/11/2019 at 18:19   ----------------------------------------------------------------    Conclusions      Procedure Summary   ALL THE CORONARIES ARE LARGE, ECTATIC AWITH CALCIFICATION   LM-PATENT   LAD PROX 30% MID 50% , DISTAL 40% VERY DISTAL STENT PATENT   LCX- PROX 40%   RCA PROX 30% DISTAL 40%   Normal LV systolic function.  LVEF approximately 50% .    LV size - normal.   EDP 11 mmhg   No Transaortic Gradient      Recommendations   Continue with aggressive risk factor modification and medical therapy.      Estimated Blood Loss:5 ml.      Complications:No complications.      Signatures      ----------------------------------------------------------------   Electronically signed by Tran Thorne MD (Performing   Physician) on 04/17/2019 at 08    Holter 48 hrs     DIARY *  Diary not completed     CONCLUSION:  *     Normal Sinus rhythm    Average Heart Rate 73 BPM  Range from 50 Bpm to  118  bpm   Rare Premature ventricular complexes  Frequent Premature atrial complexes -8%  FEW Non-sustained Supraventricular tachycardia - LONGEST 33 BEATS AT RATE 140 BPM  No long pause or profound bradycardia  No Supraventricular Tachycardia   No Atrial Fibrillation       Confirmed by Tracy Avila MD, [address] ([id number]) on 7/7/2021 7:54:11 PM    ekg 6/8/2020  Sinus  Rhythm   -RSR(V1) -nondiagnostic. PROBABLY NORMAL    ekg 6/21/21  Sinus    Bradycardia  - occasional PAC     # PACs = 1. Low voltage -possible pulmonary disease. ABNORMAL       Assessment   Diagnosis Orders   1. Chronic diastolic congestive heart failure ([address]     2. Coronary artery disease involving native coronary artery of native heart without angina pectoris     3. Essential hypertension     4. Pure hypercholesterolemia     5. S/P cardiac cath 2014 at [facility]- patent LAD stent done 2006 and 40 to 50% lesions in other coronaries     6. S/P angioplasty with stent of the LAD 2014     7. Sinus bradycardia           Plan   The most current  meds and labs reviewed       Continue the current treatment and with constant vigilance to changes in symptoms and also any potential side effects. Return for care or seek medical attention immediately if symptoms got worse and/or develop new symptoms. Coronary artery disease, seems to be stable.  Denies angina or change in breathing pattern  hX OF lad STENT 2006  The cath report from [facility] reviewed and stable done 2014  Echo WNL  Cath 2019- no obstructive ds    Hx of Sinus Bradycardia  Rate 55  On  toprol xl 12.5 po qd  Holter 48 hrs while on toprol xl 12.5 po qd-  stable  No  need for pacer now     Hyperlipidemia: on statins, followed periodically. Patient need periodic lipid and liver profile. Lipid panel and liver function test before next appointment asap    Congestive heart failure: no evidence of fluid overload today, no recent hospitalization for CHF   hx of Leg edema +2- now trace  Cont  aldactazide 25/25 1/2 tab po qd  BMP and Mg stable    Chronic low K intake prior to aldactazide   Cont kcl  K 4.1    Pat is Stable and doing well    Need lab asap    Discussed use, benefit, and side effects of prescribed medications. All patient questions answered. Pt voiced understanding. Instructed to continue current medications, diet and exercise. Continue risk factor modification and medical management. Patient agreed with treatment plan. Follow up as directed.       RTC in 6 month    [facility]

## 2021-12-30 DIAGNOSIS — I25.10 CORONARY ARTERY DISEASE INVOLVING NATIVE CORONARY ARTERY OF NATIVE HEART WITHOUT ANGINA PECTORIS: ICD-10-CM

## 2021-12-30 DIAGNOSIS — E78.00 PURE HYPERCHOLESTEROLEMIA: Chronic | ICD-10-CM

## 2021-12-30 RX ORDER — METOPROLOL SUCCINATE 25 MG/1
TABLET, EXTENDED RELEASE ORAL
Qty: 15 TABLET | Refills: 0 | Status: SHIPPED | OUTPATIENT
Start: 2021-12-30 | End: 2022-02-18

## 2021-12-30 RX ORDER — ATORVASTATIN CALCIUM 80 MG/1
TABLET, FILM COATED ORAL
Qty: 30 TABLET | Refills: 0 | Status: SHIPPED | OUTPATIENT
Start: 2021-12-30 | End: 2022-02-18

## 2021-12-30 RX ORDER — POTASSIUM CHLORIDE 20 MEQ/1
TABLET, EXTENDED RELEASE ORAL
Qty: 30 TABLET | Refills: 0 | Status: SHIPPED | OUTPATIENT
Start: 2021-12-30 | End: 2022-02-18

## 2021-12-30 RX ORDER — PANTOPRAZOLE SODIUM 40 MG/1
TABLET, DELAYED RELEASE ORAL
Qty: 30 TABLET | Refills: 0 | Status: SHIPPED | OUTPATIENT
Start: 2021-12-30 | End: 2022-02-18

## 2022-01-05 DIAGNOSIS — D68.9 COAGULOPATHY (HCC): ICD-10-CM

## 2022-01-06 NOTE — TELEPHONE ENCOUNTER
Date of last visit:  11/5/2021  Date of next visit:  2/11/2022    Requested Prescriptions     Pending Prescriptions Disp Refills    folic acid (FOLVITE) 1 MG tablet [Pharmacy Med Name: Folic Acid Oral Tablet 1 MG] 60 tablet 5     Sig: TAKE 1 TABLET BY MOUTH EVERY DAY

## 2022-01-07 RX ORDER — FOLIC ACID 1 MG/1
TABLET ORAL
Qty: 60 TABLET | Refills: 5 | Status: SHIPPED | OUTPATIENT
Start: 2022-01-07

## 2022-01-18 ENCOUNTER — NURSE ONLY (OUTPATIENT)
Dept: LAB | Age: 77
End: 2022-01-18

## 2022-01-18 DIAGNOSIS — I10 ESSENTIAL HYPERTENSION: ICD-10-CM

## 2022-01-18 DIAGNOSIS — Z95.820 S/P ANGIOPLASTY WITH STENT: ICD-10-CM

## 2022-01-18 DIAGNOSIS — E78.00 PURE HYPERCHOLESTEROLEMIA: Chronic | ICD-10-CM

## 2022-01-18 DIAGNOSIS — R00.1 SINUS BRADYCARDIA: ICD-10-CM

## 2022-01-18 DIAGNOSIS — Z98.890 S/P CARDIAC CATH: ICD-10-CM

## 2022-01-18 DIAGNOSIS — I25.10 CORONARY ARTERY DISEASE INVOLVING NATIVE CORONARY ARTERY OF NATIVE HEART WITHOUT ANGINA PECTORIS: ICD-10-CM

## 2022-01-18 DIAGNOSIS — I50.32 CHRONIC DIASTOLIC CONGESTIVE HEART FAILURE (HCC): ICD-10-CM

## 2022-01-18 LAB
ALBUMIN SERPL-MCNC: 4.2 G/DL (ref 3.5–5.1)
ALP BLD-CCNC: 77 U/L (ref 38–126)
ALT SERPL-CCNC: 33 U/L (ref 11–66)
ANION GAP SERPL CALCULATED.3IONS-SCNC: 12 MEQ/L (ref 8–16)
AST SERPL-CCNC: 35 U/L (ref 5–40)
BILIRUB SERPL-MCNC: 1.2 MG/DL (ref 0.3–1.2)
BILIRUBIN DIRECT: 0.3 MG/DL (ref 0–0.3)
BUN BLDV-MCNC: 19 MG/DL (ref 7–22)
CALCIUM SERPL-MCNC: 10 MG/DL (ref 8.5–10.5)
CHLORIDE BLD-SCNC: 102 MEQ/L (ref 98–111)
CHOLESTEROL, TOTAL: 155 MG/DL (ref 100–199)
CO2: 27 MEQ/L (ref 23–33)
CREAT SERPL-MCNC: 0.9 MG/DL (ref 0.4–1.2)
GFR SERPL CREATININE-BSD FRML MDRD: 82 ML/MIN/1.73M2
GLUCOSE BLD-MCNC: 109 MG/DL (ref 70–108)
HDLC SERPL-MCNC: 50 MG/DL
LDL CHOLESTEROL CALCULATED: 83 MG/DL
MAGNESIUM: 2 MG/DL (ref 1.6–2.4)
POTASSIUM SERPL-SCNC: 4.5 MEQ/L (ref 3.5–5.2)
SODIUM BLD-SCNC: 141 MEQ/L (ref 135–145)
TOTAL PROTEIN: 6.5 G/DL (ref 6.1–8)
TRIGL SERPL-MCNC: 109 MG/DL (ref 0–199)

## 2022-02-11 ENCOUNTER — OFFICE VISIT (OUTPATIENT)
Dept: FAMILY MEDICINE CLINIC | Age: 77
End: 2022-02-11

## 2022-02-11 VITALS
HEART RATE: 60 BPM | RESPIRATION RATE: 12 BRPM | BODY MASS INDEX: 33.96 KG/M2 | HEIGHT: 70 IN | SYSTOLIC BLOOD PRESSURE: 122 MMHG | DIASTOLIC BLOOD PRESSURE: 70 MMHG | TEMPERATURE: 96.7 F | WEIGHT: 237.25 LBS

## 2022-02-11 DIAGNOSIS — I27.82 OTHER CHRONIC PULMONARY EMBOLISM WITHOUT ACUTE COR PULMONALE (HCC): ICD-10-CM

## 2022-02-11 DIAGNOSIS — M54.50 CHRONIC MIDLINE LOW BACK PAIN WITHOUT SCIATICA: ICD-10-CM

## 2022-02-11 DIAGNOSIS — G89.29 CHRONIC MIDLINE LOW BACK PAIN WITHOUT SCIATICA: ICD-10-CM

## 2022-02-11 DIAGNOSIS — G47.33 OBSTRUCTIVE SLEEP APNEA SYNDROME: ICD-10-CM

## 2022-02-11 DIAGNOSIS — K21.9 GASTROESOPHAGEAL REFLUX DISEASE WITHOUT ESOPHAGITIS: ICD-10-CM

## 2022-02-11 DIAGNOSIS — I25.10 CORONARY ARTERY DISEASE INVOLVING NATIVE CORONARY ARTERY OF NATIVE HEART WITHOUT ANGINA PECTORIS: ICD-10-CM

## 2022-02-11 DIAGNOSIS — I50.32 CHRONIC DIASTOLIC CONGESTIVE HEART FAILURE (HCC): ICD-10-CM

## 2022-02-11 DIAGNOSIS — I10 ESSENTIAL HYPERTENSION: Primary | ICD-10-CM

## 2022-02-11 PROCEDURE — 99213 OFFICE O/P EST LOW 20 MIN: CPT | Performed by: FAMILY MEDICINE

## 2022-02-11 SDOH — ECONOMIC STABILITY: FOOD INSECURITY: WITHIN THE PAST 12 MONTHS, THE FOOD YOU BOUGHT JUST DIDN'T LAST AND YOU DIDN'T HAVE MONEY TO GET MORE.: NEVER TRUE

## 2022-02-11 SDOH — ECONOMIC STABILITY: FOOD INSECURITY: WITHIN THE PAST 12 MONTHS, YOU WORRIED THAT YOUR FOOD WOULD RUN OUT BEFORE YOU GOT MONEY TO BUY MORE.: NEVER TRUE

## 2022-02-11 ASSESSMENT — ENCOUNTER SYMPTOMS
CONSTIPATION: 0
ABDOMINAL PAIN: 0
BACK PAIN: 0
ORTHOPNEA: 0
SHORTNESS OF BREATH: 0
BLOOD IN STOOL: 0
SORE THROAT: 0
NAUSEA: 0
CHEST TIGHTNESS: 0
EYE PAIN: 0
TROUBLE SWALLOWING: 0
COUGH: 0

## 2022-02-11 ASSESSMENT — SOCIAL DETERMINANTS OF HEALTH (SDOH): HOW HARD IS IT FOR YOU TO PAY FOR THE VERY BASICS LIKE FOOD, HOUSING, MEDICAL CARE, AND HEATING?: NOT HARD AT ALL

## 2022-02-11 NOTE — PROGRESS NOTES
Subjective:      Patient ID: Conchita Shannon is a 67 y.o. male. ashd  Stable       Hx  Of  PE   And  dvt noted       Chronic  chf        gerd stable            Hypertension  This is a chronic problem. The current episode started more than 1 year ago. The problem has been resolved since onset. The problem is controlled. Pertinent negatives include no chest pain, headaches, orthopnea, palpitations, peripheral edema or shortness of breath. The current treatment provides significant improvement. There are no compliance problems. Hyperlipidemia  This is a chronic problem. The current episode started more than 1 year ago. The problem is controlled. Pertinent negatives include no chest pain or shortness of breath. Current antihyperlipidemic treatment includes statins. The current treatment provides significant improvement of lipids. There are no compliance problems. Past Medical History:   Diagnosis Date    CAD (coronary artery disease) 01/2006    cath with stent lad    Chronic back pain 2013      epidural block    Colon polyps 10/2005    Diverticulosis of colon     DVT of proximal leg (deep vein thrombosis) ([address] 12/04 2013    left     right  in 2013    DVT of proximal leg (deep vein thrombosis) ([facility])     left     GERD (gastroesophageal reflux disease)     Kidney stone on left side 12/2020    left  side  in  2009  also    Pulmonary embolus ([address] 6-2013 and  1-2015     had   right  leg  dvt    S/P cardiac cath 2014 at [facility]- patent LAD stent done 2006 and 40 to 50% lesions in other coronaries 04/12/2019    S/P cardiac cath 4/17/19- large all ectatic vessel, lm-p, lad prox 40%, mid 50%, distal 40 distal stent -patent, lcx prox 40%, rca prox 30%, dist 40%, edp 11, ef 50%- med  RX 04/17/2019    SOB (shortness of breath) on exertion 06/08/2020    Unspecified sleep apnea 2013    cpap     Review of Systems   Constitutional: Negative for fatigue and fever.    HENT: Negative for congestion, ear pain, postnasal drip, sore throat and trouble swallowing. Eyes: Negative for pain. Respiratory: Negative for cough, chest tightness and shortness of breath. Cardiovascular: Negative for chest pain, palpitations, orthopnea and leg swelling. Gastrointestinal: Negative for abdominal pain, blood in stool, constipation and nausea. Genitourinary: Negative for difficulty urinating, frequency and urgency. Musculoskeletal: Negative for arthralgias, back pain, joint swelling and neck stiffness. Skin: Negative for rash. Neurological: Negative for dizziness, weakness and headaches. Hematological: Negative for adenopathy. Does not bruise/bleed easily. Psychiatric/Behavioral: Negative for behavioral problems, dysphoric mood and sleep disturbance. /70 (Site: Right Upper Arm, Position: Sitting, Cuff Size: Medium Adult)   Pulse 60   Temp 96.7 °F (35.9 °C) (Infrared)   Resp 12   Ht 5' 10\" (1.778 m)   Wt 237 lb 4 oz (107.6 kg)   BMI 34.04 kg/m²   Objective:   Physical Exam  Vitals and nursing note reviewed. Constitutional:       Appearance: He is well-developed. HENT:      Head: Normocephalic and atraumatic. Right Ear: External ear normal.      Left Ear: External ear normal.      Nose: Nose normal.   Eyes:      Conjunctiva/sclera: Conjunctivae normal.      Pupils: Pupils are equal, round, and reactive to light. Comments: Fundi nl   Neck:      Thyroid: No thyromegaly. Cardiovascular:      Rate and Rhythm: Normal rate and regular rhythm. Heart sounds: Normal heart sounds. Pulmonary:      Effort: Pulmonary effort is normal.      Breath sounds: Normal breath sounds. No wheezing or rales. Abdominal:      General: Bowel sounds are normal.      Palpations: Abdomen is soft. There is no mass. Tenderness: There is no abdominal tenderness. Musculoskeletal:         General: Normal range of motion. Cervical back: Normal range of motion and neck supple.    Lymphadenopathy: Cervical: No cervical adenopathy. Skin:     General: Skin is warm and dry. Findings: No rash. Neurological:      Mental Status: He is alert and oriented to person, place, and time. Cranial Nerves: No cranial nerve deficit. Deep Tendon Reflexes: Reflexes are normal and symmetric. Assessment:       Diagnosis Orders   1. Essential hypertension     2. Chronic diastolic congestive heart failure ([facility]     3. Coronary artery disease involving native coronary artery of native heart without angina pectoris     4. Gastroesophageal reflux disease without esophagitis     5. Other chronic pulmonary embolism without acute cor pulmonale (HCC)     6. Chronic midline low back pain without sciatica     7. Obstructive sleep apnea syndrome           Plan:      Current Outpatient Medications   Medication Sig Dispense Refill    folic acid (FOLVITE) 1 MG tablet TAKE 1 TABLET BY MOUTH EVERY DAY 60 tablet 5    potassium chloride (KLOR-CON M) 20 MEQ extended release tablet TAKE 1 TABLET BY MOUTH EVERY DAY 30 tablet 0    metoprolol succinate (TOPROL XL) 25 MG extended release tablet TAKE 1/2 TABLET BY MOUTH ONE TIME A DAY 15 tablet 0    atorvastatin (LIPITOR) 80 MG tablet TAKE 1 TABLET BY MOUTH EVERY DAY 30 tablet 0    pantoprazole (PROTONIX) 40 MG tablet TAKE 1 TABLET BY MOUTH EVERY DAY 30 tablet 0    spironolactone-hydroCHLOROthiazide (ALDACTAZIDE) 25-25 MG per tablet Take 0.5 tablets by mouth daily Take 0.5mg daily 45 tablet 3    tamsulosin (FLOMAX) 0.4 MG capsule Take 1 capsule by mouth daily 30 capsule 0    tiZANidine (ZANAFLEX) 2 MG tablet Take 1 tablet by mouth 3 times daily as needed (BACK SPASM) 30 tablet 0    warfarin (JANTOVEN) 5 MG tablet TAKE 1 TABLET BY MOUTH ONE TIME A DAY AS DIRECTED BY COUMADIN CLINIC 30 tablet 0    clotrimazole-betamethasone (LOTRISONE) 1-0.05 % cream Apply topically 2 times daily.  Lotrisone 1-0.05% Cream 45 g 3    nitroGLYCERIN (NITROSTAT) 0.4 MG SL tablet up to max of 3 total doses. If no relief after 1 dose, call 911. 25 tablet 3    Ascorbic Acid (VITAMIN C) 1000 MG tablet Take 1,000 mg by mouth daily      Multiple Vitamins-Minerals (MULTI COMPLETE PO) Take 1 tablet by mouth daily      Omega-3 Fatty Acids (FISH OIL) 1000 MG CAPS Take 3,000 mg by mouth daily      aspirin 81 MG EC tablet Take 81 mg by mouth daily. No current facility-administered medications for this visit.              See in 3  mths      Osiel Covarrubias MD

## 2022-02-17 DIAGNOSIS — I25.10 CORONARY ARTERY DISEASE INVOLVING NATIVE CORONARY ARTERY OF NATIVE HEART WITHOUT ANGINA PECTORIS: ICD-10-CM

## 2022-02-17 DIAGNOSIS — E78.00 PURE HYPERCHOLESTEROLEMIA: Chronic | ICD-10-CM

## 2022-02-17 NOTE — TELEPHONE ENCOUNTER
Date of last visit:  Visit date not found  Date of next visit:  5/19/2022    Requested Prescriptions     Pending Prescriptions Disp Refills    potassium chloride (KLOR-CON M) 20 MEQ extended release tablet [Pharmacy Med Name: Potassium Chloride [place] ER Oral Tablet Extended Release 20 MEQ] 30 tablet 0     Sig: TAKE 1 TABLET BY MOUTH EVERY DAY    atorvastatin (LIPITOR) 80 MG tablet [Pharmacy Med Name: Atorvastatin Calcium Oral Tablet 80 MG] 30 tablet 0     Sig: TAKE 1 TABLET BY MOUTH EVERY DAY    metoprolol succinate (TOPROL XL) 25 MG extended release tablet [Pharmacy Med Name: Metoprolol Succinate ER Oral Tablet Extended Release 24 Hour 25 MG] 15 tablet 0     Sig: TAKE 1/2 TABLET BY MOUTH ONE TIME A DAY    pantoprazole (PROTONIX) 40 MG tablet [Pharmacy Med Name: Pantoprazole Sodium Oral Tablet Delayed Release 40 MG] 30 tablet 0     Sig: TAKE 1 TABLET BY MOUTH EVERY DAY

## 2022-02-18 RX ORDER — METOPROLOL SUCCINATE 25 MG/1
TABLET, EXTENDED RELEASE ORAL
Qty: 15 TABLET | Refills: 0 | Status: SHIPPED | OUTPATIENT
Start: 2022-02-18 | End: 2022-03-24

## 2022-02-18 RX ORDER — POTASSIUM CHLORIDE 20 MEQ/1
TABLET, EXTENDED RELEASE ORAL
Qty: 30 TABLET | Refills: 0 | Status: SHIPPED | OUTPATIENT
Start: 2022-02-18 | End: 2022-03-24

## 2022-02-18 RX ORDER — PANTOPRAZOLE SODIUM 40 MG/1
TABLET, DELAYED RELEASE ORAL
Qty: 30 TABLET | Refills: 0 | Status: SHIPPED | OUTPATIENT
Start: 2022-02-18 | End: 2022-03-24

## 2022-02-18 RX ORDER — ATORVASTATIN CALCIUM 80 MG/1
TABLET, FILM COATED ORAL
Qty: 30 TABLET | Refills: 0 | Status: SHIPPED | OUTPATIENT
Start: 2022-02-18 | End: 2022-03-24

## 2022-03-24 DIAGNOSIS — E78.00 PURE HYPERCHOLESTEROLEMIA: Chronic | ICD-10-CM

## 2022-03-24 DIAGNOSIS — I25.10 CORONARY ARTERY DISEASE INVOLVING NATIVE CORONARY ARTERY OF NATIVE HEART WITHOUT ANGINA PECTORIS: ICD-10-CM

## 2022-03-24 NOTE — TELEPHONE ENCOUNTER
The pharmacy is requesting a refill of the below medication which has been pended for you:     Requested Prescriptions     Pending Prescriptions Disp Refills    atorvastatin (LIPITOR) 80 MG tablet [Pharmacy Med Name: Atorvastatin Calcium Oral Tablet 80 MG] 90 tablet 0     Sig: TAKE 1 TABLET BY MOUTH EVERY DAY    pantoprazole (PROTONIX) 40 MG tablet [Pharmacy Med Name: Pantoprazole Sodium Oral Tablet Delayed Release 40 MG] 90 tablet 0     Sig: TAKE 1 TABLET BY MOUTH EVERY DAY    potassium chloride (KLOR-CON M) 20 MEQ extended release tablet [Pharmacy Med Name: Potassium Chloride [place] ER Oral Tablet Extended Release 20 MEQ] 90 tablet 0     Sig: TAKE 1 TABLET BY MOUTH EVERY DAY    metoprolol succinate (TOPROL XL) 25 MG extended release tablet [Pharmacy Med Name: Metoprolol Succinate ER Oral Tablet Extended Release 24 Hour 25 MG] 45 tablet 0     Sig: TAKE 1/2 TABLET BY MOUTH ONE TIME A DAY       Last Appointment Date: 2/11/2022  Next Appointment Date: 5/19/2022    No Known Allergies

## 2022-03-25 RX ORDER — PANTOPRAZOLE SODIUM 40 MG/1
TABLET, DELAYED RELEASE ORAL
Qty: 90 TABLET | Refills: 1 | Status: SHIPPED | OUTPATIENT
Start: 2022-03-25 | End: 2022-07-01

## 2022-03-25 RX ORDER — POTASSIUM CHLORIDE 20 MEQ/1
TABLET, EXTENDED RELEASE ORAL
Qty: 90 TABLET | Refills: 1 | Status: SHIPPED | OUTPATIENT
Start: 2022-03-25 | End: 2022-07-01

## 2022-03-25 RX ORDER — ATORVASTATIN CALCIUM 80 MG/1
TABLET, FILM COATED ORAL
Qty: 90 TABLET | Refills: 1 | Status: SHIPPED | OUTPATIENT
Start: 2022-03-25 | End: 2022-07-01

## 2022-03-25 RX ORDER — METOPROLOL SUCCINATE 25 MG/1
TABLET, EXTENDED RELEASE ORAL
Qty: 45 TABLET | Refills: 1 | Status: SHIPPED | OUTPATIENT
Start: 2022-03-25 | End: 2022-07-01

## 2022-05-19 ENCOUNTER — OFFICE VISIT (OUTPATIENT)
Dept: FAMILY MEDICINE CLINIC | Age: 77
End: 2022-05-19

## 2022-05-19 VITALS
BODY MASS INDEX: 33.5 KG/M2 | WEIGHT: 234 LBS | HEIGHT: 70 IN | HEART RATE: 56 BPM | SYSTOLIC BLOOD PRESSURE: 110 MMHG | DIASTOLIC BLOOD PRESSURE: 72 MMHG | RESPIRATION RATE: 10 BRPM

## 2022-05-19 DIAGNOSIS — E78.00 PURE HYPERCHOLESTEROLEMIA: Chronic | ICD-10-CM

## 2022-05-19 DIAGNOSIS — I10 ESSENTIAL HYPERTENSION: Primary | ICD-10-CM

## 2022-05-19 DIAGNOSIS — G89.29 CHRONIC MIDLINE LOW BACK PAIN WITHOUT SCIATICA: ICD-10-CM

## 2022-05-19 DIAGNOSIS — I50.32 CHRONIC DIASTOLIC CONGESTIVE HEART FAILURE (HCC): ICD-10-CM

## 2022-05-19 DIAGNOSIS — K21.9 GASTROESOPHAGEAL REFLUX DISEASE WITHOUT ESOPHAGITIS: ICD-10-CM

## 2022-05-19 DIAGNOSIS — I27.82 OTHER CHRONIC PULMONARY EMBOLISM WITHOUT ACUTE COR PULMONALE (HCC): ICD-10-CM

## 2022-05-19 DIAGNOSIS — I25.10 CORONARY ARTERY DISEASE INVOLVING NATIVE CORONARY ARTERY OF NATIVE HEART WITHOUT ANGINA PECTORIS: ICD-10-CM

## 2022-05-19 DIAGNOSIS — M54.50 CHRONIC MIDLINE LOW BACK PAIN WITHOUT SCIATICA: ICD-10-CM

## 2022-05-19 DIAGNOSIS — G47.33 OBSTRUCTIVE SLEEP APNEA SYNDROME: ICD-10-CM

## 2022-05-19 PROCEDURE — 99213 OFFICE O/P EST LOW 20 MIN: CPT | Performed by: FAMILY MEDICINE

## 2022-05-19 ASSESSMENT — ENCOUNTER SYMPTOMS
HOARSE VOICE: 0
TROUBLE SWALLOWING: 0
SHORTNESS OF BREATH: 0
SORE THROAT: 0
NAUSEA: 0
BLOOD IN STOOL: 0
COUGH: 0
ABDOMINAL PAIN: 0
EYE PAIN: 0
BACK PAIN: 0
ORTHOPNEA: 0
CHEST TIGHTNESS: 0
CONSTIPATION: 0

## 2022-05-19 NOTE — PROGRESS NOTES
Subjective:      Patient ID: Traci Rainey is a 65 y.o. male. ashd  Stable       Hx  opf  pe  Noted          Diastolic  chf  Noted       bph  Stable          Labs  Stable     Hypertension  This is a chronic problem. The current episode started more than 1 year ago. The problem has been resolved since onset. The problem is controlled. Pertinent negatives include no chest pain, headaches, orthopnea, palpitations, peripheral edema or shortness of breath. The current treatment provides significant improvement. There are no compliance problems. Gastroesophageal Reflux  He reports no abdominal pain, no chest pain, no coughing, no hoarse voice, no nausea or no sore throat. This is a chronic problem. The current episode started more than 1 year ago. The problem occurs rarely. The symptoms are aggravated by certain foods. Pertinent negatives include no fatigue. He has tried a PPI for the symptoms. The treatment provided significant relief.      Past Medical History:   Diagnosis Date    CAD (coronary artery disease) 01/2006    cath with stent lad    Chronic back pain 2013      epidural block    Colon polyps 10/2005    Diverticulosis of colon     DVT of proximal leg (deep vein thrombosis) ([address] 12/04 2013    left     right  in 2013    DVT of proximal leg (deep vein thrombosis) ([facility])     left     GERD (gastroesophageal reflux disease)     Kidney stone on left side 12/2020    left  side  in  2009  also    Pulmonary embolus ([address] 6-2013 and  1-2015     had   right  leg  dvt    S/P cardiac cath 2014 at [facility]- patent LAD stent done 2006 and 40 to 50% lesions in other coronaries 04/12/2019    S/P cardiac cath 4/17/19- large all ectatic vessel, lm-p, lad prox 40%, mid 50%, distal 40 distal stent -patent, lcx prox 40%, rca prox 30%, dist 40%, edp 11, ef 50%- med  RX 04/17/2019    SOB (shortness of breath) on exertion 06/08/2020    Unspecified sleep apnea 2013    cpap       Review of Systems   Constitutional: Negative for fatigue and fever. HENT: Negative for congestion, ear pain, hoarse voice, postnasal drip, sore throat and trouble swallowing. Eyes: Negative for pain. Respiratory: Negative for cough, chest tightness and shortness of breath. Cardiovascular: Negative for chest pain, palpitations, orthopnea and leg swelling. Gastrointestinal: Negative for abdominal pain, blood in stool, constipation and nausea. Genitourinary: Negative for difficulty urinating, frequency and urgency. Musculoskeletal: Negative for arthralgias, back pain, joint swelling and neck stiffness. Skin: Negative for rash. Neurological: Negative for dizziness, weakness and headaches. Hematological: Negative for adenopathy. Does not bruise/bleed easily. Psychiatric/Behavioral: Negative for behavioral problems, dysphoric mood and sleep disturbance. /72 (Site: Right Upper Arm, Position: Sitting, Cuff Size: Medium Adult)   Pulse 56   Resp 10   Ht 5' 10\" (1.778 m)   Wt 234 lb (106.1 kg)   BMI 33.58 kg/m²   Objective:   Physical Exam  Vitals and nursing note reviewed. Constitutional:       Appearance: He is well-developed. HENT:      Head: Normocephalic and atraumatic. Right Ear: External ear normal.      Left Ear: External ear normal.      Nose: Nose normal.   Eyes:      Conjunctiva/sclera: Conjunctivae normal.      Pupils: Pupils are equal, round, and reactive to light. Comments: Fundi nl   Neck:      Thyroid: No thyromegaly. Cardiovascular:      Rate and Rhythm: Normal rate and regular rhythm. Heart sounds: Normal heart sounds. Pulmonary:      Effort: Pulmonary effort is normal.      Breath sounds: Normal breath sounds. No wheezing or rales. Abdominal:      General: Bowel sounds are normal.      Palpations: Abdomen is soft. There is no mass. Tenderness: There is no abdominal tenderness. Musculoskeletal:         General: Normal range of motion.       Cervical back: Normal range of motion and neck supple. Lymphadenopathy:      Cervical: No cervical adenopathy. Skin:     General: Skin is warm and dry. Findings: No rash. Neurological:      Mental Status: He is alert and oriented to person, place, and time. Cranial Nerves: No cranial nerve deficit. Deep Tendon Reflexes: Reflexes are normal and symmetric. Assessment:       Diagnosis Orders   1. Essential hypertension     2. Chronic midline low back pain without sciatica     3. Obstructive sleep apnea syndrome     4. Other chronic pulmonary embolism without acute cor pulmonale (HCC)     5. Gastroesophageal reflux disease without esophagitis     6. Chronic diastolic congestive heart failure ([facility]     7. Coronary artery disease involving native coronary artery of native heart without angina pectoris     8.  Pure hypercholesterolemia           Plan:      Current Outpatient Medications   Medication Sig Dispense Refill    atorvastatin (LIPITOR) 80 MG tablet TAKE 1 TABLET BY MOUTH EVERY DAY 90 tablet 1    pantoprazole (PROTONIX) 40 MG tablet TAKE 1 TABLET BY MOUTH EVERY DAY 90 tablet 1    potassium chloride (KLOR-CON M) 20 MEQ extended release tablet TAKE 1 TABLET BY MOUTH EVERY DAY 90 tablet 1    metoprolol succinate (TOPROL XL) 25 MG extended release tablet TAKE 1/2 TABLET BY MOUTH ONE TIME A DAY 45 tablet 1    folic acid (FOLVITE) 1 MG tablet TAKE 1 TABLET BY MOUTH EVERY DAY 60 tablet 5    spironolactone-hydroCHLOROthiazide (ALDACTAZIDE) 25-25 MG per tablet Take 0.5 tablets by mouth daily Take 0.5mg daily (Patient taking differently: Take 0.5 tablets by mouth daily ) 45 tablet 3    tamsulosin (FLOMAX) 0.4 MG capsule Take 1 capsule by mouth daily 30 capsule 0    tiZANidine (ZANAFLEX) 2 MG tablet Take 1 tablet by mouth 3 times daily as needed (BACK SPASM) 30 tablet 0    warfarin (JANTOVEN) 5 MG tablet TAKE 1 TABLET BY MOUTH ONE TIME A DAY AS DIRECTED BY COUMADIN CLINIC 30 tablet 0    clotrimazole-betamethasone (LOTRISONE) 1-0.05 % cream Apply topically 2 times daily. Lotrisone 1-0.05% Cream 45 g 3    nitroGLYCERIN (NITROSTAT) 0.4 MG SL tablet up to max of 3 total doses. If no relief after 1 dose, call 911. 25 tablet 3    Ascorbic Acid (VITAMIN C) 1000 MG tablet Take 1,000 mg by mouth daily      Multiple Vitamins-Minerals (MULTI COMPLETE PO) Take 1 tablet by mouth daily      Omega-3 Fatty Acids (FISH OIL) 1000 MG CAPS Take 3,000 mg by mouth daily      aspirin 81 MG EC tablet Take 81 mg by mouth daily. No current facility-administered medications for this visit. No orders of the defined types were placed in this encounter.          see in  3  mths   Kenyatta Ryan MD

## 2022-05-19 NOTE — PROGRESS NOTES
No components found for: CHLPL  Lab Results   Component Value Date    TRIG 96 04/30/2022     Lab Results   Component Value Date    HDL 44 04/30/2022     Lab Results   Component Value Date    LDLCALC 81 04/30/2022     Lab Results   Component Value Date    LABVLDL 21 02/12/2021       Lab Results   Component Value Date    ALT 30 04/30/2022    AST 29 04/30/2022    ALKPHOS 66 04/30/2022    BILITOT 0.9 04/30/2022           Is patient currently taking any cholesterol medications? Yes   If yes, see med list as above    Is the patient reporting any side effects of cholesterol medications? No    Is the patient taking any over the counter medications? Yes   If yes, see med list as above    Is the patient taking a daily aspirin? Yes      Patient Self-Management Goal for Chronic Condition  Goal: I will take all medications as prescribed by my doctor, and I will call the office if I am having any medication problems. Barriers to success: none  Plan for overcoming my barriers: N/A     Confidence: 10/10  Date goal set: 5/19/22  Date goal attained:     Have you seen any other physician or provider since your last visit no    Have you had any other diagnostic tests since your last visit? no    Have you changed or stopped any medications since your last visit including any over-the-counter medicines, vitamins, or herbal medicines? no     Are you taking all your prescribed medications?  Yes    If NO, why?

## 2022-06-26 DIAGNOSIS — I25.10 CORONARY ARTERY DISEASE INVOLVING NATIVE CORONARY ARTERY OF NATIVE HEART WITHOUT ANGINA PECTORIS: ICD-10-CM

## 2022-06-27 RX ORDER — SPIRONOLACTONE AND HYDROCHLOROTHIAZIDE 25; 25 MG/1; MG/1
0.5 TABLET ORAL DAILY
Qty: 15 TABLET | Refills: 0 | Status: SHIPPED | OUTPATIENT
Start: 2022-06-27 | End: 2022-06-30

## 2022-06-27 NOTE — TELEPHONE ENCOUNTER
Date of last visit:  5/19/2022  Date of next visit:  8/30/2022    Requested Prescriptions     Pending Prescriptions Disp Refills    warfarin (Khadijah Beams) 5 MG tablet [[facility]] 90 tablet 0     Sig: TAKE AS DIRECTED BY COUMADIN CLINIC

## 2022-06-28 RX ORDER — WARFARIN SODIUM 5 MG/1
TABLET ORAL
Qty: 90 TABLET | Refills: 2 | Status: SHIPPED | OUTPATIENT
Start: 2022-06-28

## 2022-06-30 DIAGNOSIS — I25.10 CORONARY ARTERY DISEASE INVOLVING NATIVE CORONARY ARTERY OF NATIVE HEART WITHOUT ANGINA PECTORIS: ICD-10-CM

## 2022-06-30 DIAGNOSIS — E78.00 PURE HYPERCHOLESTEROLEMIA: Chronic | ICD-10-CM

## 2022-06-30 RX ORDER — SPIRONOLACTONE AND HYDROCHLOROTHIAZIDE 25; 25 MG/1; MG/1
TABLET ORAL
Qty: 45 TABLET | Refills: 3 | Status: SHIPPED | OUTPATIENT
Start: 2022-06-30

## 2022-07-01 RX ORDER — ATORVASTATIN CALCIUM 80 MG/1
TABLET, FILM COATED ORAL
Qty: 90 TABLET | Refills: 0 | Status: SHIPPED | OUTPATIENT
Start: 2022-07-01

## 2022-07-01 RX ORDER — PANTOPRAZOLE SODIUM 40 MG/1
TABLET, DELAYED RELEASE ORAL
Qty: 90 TABLET | Refills: 0 | Status: SHIPPED | OUTPATIENT
Start: 2022-07-01 | End: 2022-09-30 | Stop reason: ALTCHOICE

## 2022-07-01 RX ORDER — METOPROLOL SUCCINATE 25 MG/1
TABLET, EXTENDED RELEASE ORAL
Qty: 45 TABLET | Refills: 0 | Status: SHIPPED | OUTPATIENT
Start: 2022-07-01

## 2022-07-01 RX ORDER — POTASSIUM CHLORIDE 20 MEQ/1
TABLET, EXTENDED RELEASE ORAL
Qty: 90 TABLET | Refills: 0 | Status: SHIPPED | OUTPATIENT
Start: 2022-07-01

## 2022-07-07 ENCOUNTER — OFFICE VISIT (OUTPATIENT)
Dept: CARDIOLOGY CLINIC | Age: 77
End: 2022-07-07
Payer: MEDICARE

## 2022-07-07 VITALS
SYSTOLIC BLOOD PRESSURE: 102 MMHG | HEART RATE: 58 BPM | WEIGHT: 232.8 LBS | DIASTOLIC BLOOD PRESSURE: 63 MMHG | HEIGHT: 70 IN | BODY MASS INDEX: 33.33 KG/M2

## 2022-07-07 DIAGNOSIS — I10 ESSENTIAL HYPERTENSION: ICD-10-CM

## 2022-07-07 DIAGNOSIS — I25.10 CORONARY ARTERY DISEASE INVOLVING NATIVE CORONARY ARTERY OF NATIVE HEART WITHOUT ANGINA PECTORIS: Primary | ICD-10-CM

## 2022-07-07 DIAGNOSIS — E78.00 PURE HYPERCHOLESTEROLEMIA: Chronic | ICD-10-CM

## 2022-07-07 DIAGNOSIS — Z98.890 S/P CARDIAC CATH: ICD-10-CM

## 2022-07-07 DIAGNOSIS — Z95.820 S/P ANGIOPLASTY WITH STENT: ICD-10-CM

## 2022-07-07 DIAGNOSIS — I50.32 CHRONIC DIASTOLIC CONGESTIVE HEART FAILURE (HCC): ICD-10-CM

## 2022-07-07 PROCEDURE — 1123F ACP DISCUSS/DSCN MKR DOCD: CPT | Performed by: INTERNAL MEDICINE

## 2022-07-07 PROCEDURE — 93000 ELECTROCARDIOGRAM COMPLETE: CPT | Performed by: INTERNAL MEDICINE

## 2022-07-07 PROCEDURE — 99214 OFFICE O/P EST MOD 30 MIN: CPT | Performed by: INTERNAL MEDICINE

## 2022-07-07 NOTE — PROGRESS NOTES
Chief Complaint   Patient presents with    6 Month Follow-Up   Former pat of Dr. Chrissy Neumann  Hx of CAD s/p stent 8yrs back        Pt here for 6 month follow up. Denied Chest pain , palpitations or  Dizziness    Leg edema better after aldactazide    Sob on exertion - chronic-unchanged    EKG done today    No wt gain      Patient Seen, Chart, Consults notes, Labs, Radiology studies reviewed.             Patient Active Problem List   Diagnosis    CAD (coronary artery disease)    Diverticulosis of colon    Hyperlipidemia    Obesity    GERD (gastroesophageal reflux disease)    Colon polyps    Pulmonary embolus ([address]    Sleep apnea    Chronic back pain    Chronic diastolic congestive heart failure ([address]    S/P cardiac cath 2014 at [facility]- patent LAD stent done 2006 and 40 to 50% lesions in other coronaries    S/P angioplasty with stent of the LAD 2014    Essential hypertension    Sinus bradycardia       Past Surgical History:   Procedure Laterality Date    BACK SURGERY  5-    Dr Cynthia Salamanca @ [facility])    [address]  2014       50%  lesion    bamdad    CARDIAC CATHETERIZATION  04/2019      dr Gisele Jeong  and  diffuse  disease  30 to 50%    COLONOSCOPY  2012 2020    sheik    polyps   due   2025    CORONARY ANGIOPLASTY WITH STENT PLACEMENT  2006    LAD    EYE SURGERY  2011    bilateral    HERNIA REPAIR  44/27    umbilical    JOINT REPLACEMENT Right 04/2019     [place]  right  hip    VENA CAVA FILTER PLACEMENT  2016    placed  iin  1-2014  and  removed  5-2016       No Known Allergies     Family History   Problem Relation Age of Onset    Heart Disease Father         Social History     Socioeconomic History    Marital status:      Spouse name: Not on file    Number of children: Not on file    Years of education: Not on file    Highest education level: Not on file   Occupational History    Not on file   Tobacco Use    Smoking status: Never Smoker  Smokeless tobacco: Never Used   Vaping Use    Vaping Use: Never used   Substance and Sexual Activity    Alcohol use: Yes     Alcohol/week: 3.3 standard drinks     Types: 4 Standard drinks or equivalent per week    Drug use: No    Sexual activity: Yes     Partners: Female   Other Topics Concern    Not on file   Social History Narrative    Not on file     Social Determinants of Health     Financial Resource Strain: Low Risk     Difficulty of Paying Living Expenses: Not hard at all   Food Insecurity: No Food Insecurity    Worried About [facility] in the Last Year: Never true    [facility] in the Last Year: Never true   Transportation Needs:     Lack of Transportation (Medical): Not on file    Lack of Transportation (Non-Medical):  Not on file   Physical Activity:     Days of Exercise per Week: Not on file    Minutes of Exercise per Session: Not on file   Stress:     Feeling of Stress : Not on file   Social Connections:     Frequency of Communication with Friends and Family: Not on file    Frequency of Social Gatherings with Friends and Family: Not on file    Attends Taoism Services: Not on file    Active Member of [address] [place] [address] or Organizations: Not on file    Attends Club or Organization Meetings: Not on file    Marital Status: Not on file   Intimate Partner Violence:     Fear of Current or Ex-Partner: Not on file    Emotionally Abused: Not on file    Physically Abused: Not on file    Sexually Abused: Not on file   Housing Stability:     Unable to Pay for Housing in the Last Year: Not on file    Number of [place] in the Last Year: Not on file    Unstable Housing in the Last Year: Not on file       Current Outpatient Medications   Medication Sig Dispense Refill    atorvastatin (LIPITOR) 80 MG tablet TAKE 1 TABLET BY MOUTH EVERY DAY 90 tablet 0    pantoprazole (PROTONIX) 40 MG tablet TAKE 1 TABLET BY MOUTH EVERY DAY 90 tablet 0    metoprolol succinate (TOPROL XL) 25 MG extended release tablet TAKE 1/2 TABLET BY MOUTH ONE TIME A DAY 45 tablet 0    potassium chloride (KLOR-CON M) 20 MEQ extended release tablet TAKE 1 TABLET BY MOUTH EVERY DAY 90 tablet 0    spironolactone-hydroCHLOROthiazide (ALDACTAZIDE) 25-25 MG per tablet TAKE 1/2 TABLET BY MOUTH ONE TIME A DAY 45 tablet 3    warfarin (JANTOVEN) 5 MG tablet TAKE AS DIRECTED BY COUMADIN CLINIC 90 tablet 2    folic acid (FOLVITE) 1 MG tablet TAKE 1 TABLET BY MOUTH EVERY DAY 60 tablet 5    tamsulosin (FLOMAX) 0.4 MG capsule Take 1 capsule by mouth daily 30 capsule 0    tiZANidine (ZANAFLEX) 2 MG tablet Take 1 tablet by mouth 3 times daily as needed (BACK SPASM) 30 tablet 0    clotrimazole-betamethasone (LOTRISONE) 1-0.05 % cream Apply topically 2 times daily. Lotrisone 1-0.05% Cream 45 g 3    nitroGLYCERIN (NITROSTAT) 0.4 MG SL tablet up to max of 3 total doses. If no relief after 1 dose, call 911. 25 tablet 3    Ascorbic Acid (VITAMIN C) 1000 MG tablet Take 1,000 mg by mouth daily      Multiple Vitamins-Minerals (MULTI COMPLETE PO) Take 1 tablet by mouth daily      Omega-3 Fatty Acids (FISH OIL) 1000 MG CAPS Take 3,000 mg by mouth daily      aspirin 81 MG EC tablet Take 81 mg by mouth daily. No current facility-administered medications for this visit. Review of Systems -     General ROS: negative  Psychological ROS: negative  Hematological and Lymphatic ROS: No history of blood clots or bleeding disorder. Respiratory ROS: no cough,  or wheezing, the rest see HPI  Cardiovascular ROS: See HPI  Gastrointestinal ROS: negative  Genito-Urinary ROS: no dysuria, trouble voiding, or hematuria  Musculoskeletal ROS: negative  Neurological ROS: no TIA or stroke symptoms  Dermatological ROS: negative      Blood pressure 102/63, pulse 58, height 5' 10\" (1.778 m), weight 232 lb 12.8 oz (105.6 kg).         Physical Examination:    General appearance - alert, well appearing, and in no distress  HEENT- Pink conjunctiva  , Non-icteri sclera,PERRLA  Mental status - alert, oriented to person, place, and time  Neck - supple, no significant adenopathy, no JVD, or carotid bruits  Chest - clear to auscultation, no wheezes, rales or rhonchi, symmetric air entry  Heart - normal rate, regular rhythm, normal S1, S2, no murmurs, rubs, clicks or gallops  Abdomen - soft, nontender, nondistended, no masses or organomegaly  MIN- no CVA or flank tenderness, no suprapubic tenderness  Neurological - alert, oriented, normal speech, no focal findings or movement disorder noted  Musculoskeletal/limbs - no joint tenderness, deformity or swelling   - peripheral pulses normal, no pedal edema, no clubbing or cyanosis  Skin - normal coloration and turgor, no rashes, no suspicious skin lesions noted  Psych- appropriate mood and affect    Lab  No results for input(s): CKTOTAL, CKMB, CKMBINDEX, TROPONINI in the last 72 hours.   CBC:   Lab Results   Component Value Date/Time    WBC 6.8 04/30/2022 06:30 AM    RBC 4.72 04/30/2022 06:30 AM    RBC 4.75 02/12/2021 03:16 PM    HGB 14.9 04/30/2022 06:30 AM    HCT 43.1 04/30/2022 06:30 AM    MCV 91.4 04/30/2022 06:30 AM    MCH 31.6 04/30/2022 06:30 AM    MCHC 34.6 04/30/2022 06:30 AM    RDW 13.7 04/30/2022 06:30 AM     04/30/2022 06:30 AM    MPV 8.7 02/12/2021 03:16 PM     BMP:    Lab Results   Component Value Date/Time     04/30/2022 06:30 AM    K 4.2 04/30/2022 06:30 AM    K 4.1 04/17/2019 05:30 AM     04/30/2022 06:30 AM    CO2 27 04/30/2022 06:30 AM    BUN 16 04/30/2022 06:30 AM    LABALBU 4.0 04/30/2022 06:30 AM    CREATININE 0.88 04/30/2022 06:30 AM    CALCIUM 9.30 04/30/2022 06:30 AM    LABGLOM 82 01/18/2022 01:22 PM    GLUCOSE 108 04/30/2022 06:30 AM     Hepatic Function Panel:    Lab Results   Component Value Date/Time    ALKPHOS 66 04/30/2022 06:30 AM    ALT 30 04/30/2022 06:30 AM    AST 29 04/30/2022 06:30 AM    PROT 6.3 04/30/2022 06:30 AM    BILITOT 0.9 04/30/2022 06:30 AM    BILIDIR 0.2 Summary   ALL THE CORONARIES ARE LARGE, ECTATIC AWITH CALCIFICATION   LM-PATENT   LAD PROX 30% MID 50% , DISTAL 40% VERY DISTAL STENT PATENT   LCX- PROX 40%   RCA PROX 30% DISTAL 40%   Normal LV systolic function.  LVEF approximately 50% .   LV size - normal.   EDP 11 mmhg   No Transaortic Gradient      Recommendations   Continue with aggressive risk factor modification and medical therapy.      Estimated Blood Loss:5 ml.      Complications:No complications.      Signatures      ----------------------------------------------------------------   Electronically signed by Tianna Vargas MD (Performing   Physician) on 04/17/2019 at 08    Holter 48 hrs     DIARY *  Diary not completed     CONCLUSION:  *     Normal Sinus rhythm    Average Heart Rate 73 BPM  Range from 50 Bpm to  118  bpm   Rare Premature ventricular complexes  Frequent Premature atrial complexes -8%  FEW Non-sustained Supraventricular tachycardia - LONGEST 33 BEATS AT RATE 140 BPM  No long pause or profound bradycardia  No Supraventricular Tachycardia   No Atrial Fibrillation       Confirmed by Arvin Hull MD, Armando Bell ([id number]) on 7/7/2021 7:54:11 PM    ekg 6/8/2020  Sinus  Rhythm   -RSR(V1) -nondiagnostic. PROBABLY NORMAL    ekg 6/21/21  Sinus    Bradycardia  - occasional PAC     # PACs = 1. Low voltage -possible pulmonary disease. ABNORMAL     ekg 7/7/22  Sinus   Bradycardia  58 bpm -With rate variation   cv = 11.  -RSR(V1) -nondiagnostic. Low voltage -possible pulmonary disease. ABNORMAL       Assessment   Diagnosis Orders   1. Coronary artery disease involving native coronary artery of native heart without angina pectoris  EKG 12 Lead   2. Chronic diastolic congestive heart failure (HCC)  EKG 12 Lead   3. Essential hypertension  EKG 12 Lead   4. Pure hypercholesterolemia     5. S/P cardiac cath 2014 at [facility]- patent LAD stent done 2006 and 40 to 50% lesions in other coronaries     6.  S/P angioplasty with stent of the LAD 2014           Plan   The most current  meds and labs reviewed       Continue the current treatment and with constant vigilance to changes in symptoms and also any potential side effects. Return for care or seek medical attention immediately if symptoms got worse and/or develop new symptoms. Coronary artery disease, seems to be stable. Denies angina or change in breathing pattern  hX OF lad STENT 2006  The cath [facility] from [facility] reviewed and stable done 2014  Echo WNL  Cath 2019- no obstructive ds    Hx of Sinus Bradycardia  Rate 58  On  toprol xl 12.5 po qd  Holter 48 hrs while on toprol xl 12.5 po qd-  stable  No  need for pacer now     Hyperlipidemia: on statins, followed periodically. Patient need periodic lipid and liver profile. Lipid panel and liver function test before next appointment asap    Congestive heart failure: no evidence of fluid overload today, no recent hospitalization for CHF   hx of Leg edema +2- now trace  Cont  aldactazide 25/25 1/2 tab po qd  BMP and Mg stable    Chronic low K intake prior to aldactazide   Cont kcl  K 4.2    Overall Pat is Stable and doing well    D/w the pat the plan of care    Discussed use, benefit, and side effects of prescribed medications. All patient questions answered. Pt voiced understanding. Instructed to continue current medications, diet and exercise. Continue risk factor modification and medical management. Patient agreed with treatment plan. Follow up as directed.       RTC in 6 month    Cesar [facility]ares[facility]

## 2022-07-26 ENCOUNTER — NURSE ONLY (OUTPATIENT)
Dept: LAB | Age: 77
End: 2022-07-26

## 2022-07-26 DIAGNOSIS — I10 ESSENTIAL HYPERTENSION: ICD-10-CM

## 2022-07-26 DIAGNOSIS — E78.00 PURE HYPERCHOLESTEROLEMIA: Chronic | ICD-10-CM

## 2022-07-26 DIAGNOSIS — I25.10 CORONARY ARTERY DISEASE INVOLVING NATIVE CORONARY ARTERY OF NATIVE HEART WITHOUT ANGINA PECTORIS: ICD-10-CM

## 2022-07-26 DIAGNOSIS — I50.32 CHRONIC DIASTOLIC CONGESTIVE HEART FAILURE (HCC): ICD-10-CM

## 2022-07-26 DIAGNOSIS — Z98.890 S/P CARDIAC CATH: ICD-10-CM

## 2022-07-26 DIAGNOSIS — Z95.820 S/P ANGIOPLASTY WITH STENT: ICD-10-CM

## 2022-07-26 LAB
ALBUMIN SERPL-MCNC: 4.1 G/DL (ref 3.5–5.1)
ALP BLD-CCNC: 83 U/L (ref 38–126)
ALT SERPL-CCNC: 25 U/L (ref 11–66)
ANION GAP SERPL CALCULATED.3IONS-SCNC: 10 MEQ/L (ref 8–16)
AST SERPL-CCNC: 33 U/L (ref 5–40)
BILIRUB SERPL-MCNC: 1 MG/DL (ref 0.3–1.2)
BILIRUBIN DIRECT: < 0.2 MG/DL (ref 0–0.3)
BUN BLDV-MCNC: 15 MG/DL (ref 7–22)
CALCIUM SERPL-MCNC: 9.8 MG/DL (ref 8.5–10.5)
CHLORIDE BLD-SCNC: 105 MEQ/L (ref 98–111)
CHOLESTEROL, TOTAL: 151 MG/DL (ref 100–199)
CO2: 26 MEQ/L (ref 23–33)
CREAT SERPL-MCNC: 0.8 MG/DL (ref 0.4–1.2)
GFR SERPL CREATININE-BSD FRML MDRD: > 90 ML/MIN/1.73M2
GLUCOSE BLD-MCNC: 109 MG/DL (ref 70–108)
HDLC SERPL-MCNC: 47 MG/DL
LDL CHOLESTEROL CALCULATED: 77 MG/DL
MAGNESIUM: 1.9 MG/DL (ref 1.6–2.4)
POTASSIUM SERPL-SCNC: 4.3 MEQ/L (ref 3.5–5.2)
SODIUM BLD-SCNC: 141 MEQ/L (ref 135–145)
TOTAL PROTEIN: 6.5 G/DL (ref 6.1–8)
TRIGL SERPL-MCNC: 133 MG/DL (ref 0–199)

## 2022-08-25 ENCOUNTER — TELEPHONE (OUTPATIENT)
Dept: FAMILY MEDICINE CLINIC | Age: 77
End: 2022-08-25

## 2022-08-25 RX ORDER — AMOXICILLIN AND CLAVULANATE POTASSIUM 875; 125 MG/1; MG/1
1 TABLET, FILM COATED ORAL 2 TIMES DAILY
Qty: 20 TABLET | Refills: 0 | Status: SHIPPED | OUTPATIENT
Start: 2022-08-25 | End: 2022-09-04

## 2022-08-25 NOTE — TELEPHONE ENCOUNTER
Date of last visit:  5/19/2022  Date of next visit:  8/30/2022    Requested Prescriptions     Signed Prescriptions Disp Refills    amoxicillin-clavulanate (AUGMENTIN) 875-125 MG per tablet 20 tablet 0     Sig: Take 1 tablet by mouth 2 times daily for 10 days     Authorizing Provider: Kd Garcia     Ordering User: Christina Barboza

## 2022-08-25 NOTE — TELEPHONE ENCOUNTER
Pt stopped in stating that he is having a flare up of his diverticulitis. He thought he had medication left for this but it outdated. He is asking for a new RX to be called in for him.      Angela Taylor may be reached at [phone]

## 2022-08-30 ENCOUNTER — OFFICE VISIT (OUTPATIENT)
Dept: FAMILY MEDICINE CLINIC | Age: 77
End: 2022-08-30

## 2022-08-30 VITALS
HEIGHT: 70 IN | RESPIRATION RATE: 16 BRPM | HEART RATE: 68 BPM | DIASTOLIC BLOOD PRESSURE: 76 MMHG | SYSTOLIC BLOOD PRESSURE: 106 MMHG | BODY MASS INDEX: 31.96 KG/M2 | WEIGHT: 223.25 LBS

## 2022-08-30 DIAGNOSIS — R10.84 GENERALIZED ABDOMINAL PAIN: ICD-10-CM

## 2022-08-30 DIAGNOSIS — G89.29 CHRONIC MIDLINE LOW BACK PAIN WITHOUT SCIATICA: ICD-10-CM

## 2022-08-30 DIAGNOSIS — K21.9 GASTROESOPHAGEAL REFLUX DISEASE WITHOUT ESOPHAGITIS: ICD-10-CM

## 2022-08-30 DIAGNOSIS — I25.10 CORONARY ARTERY DISEASE INVOLVING NATIVE CORONARY ARTERY OF NATIVE HEART WITHOUT ANGINA PECTORIS: ICD-10-CM

## 2022-08-30 DIAGNOSIS — G47.33 OBSTRUCTIVE SLEEP APNEA SYNDROME: ICD-10-CM

## 2022-08-30 DIAGNOSIS — I50.32 CHRONIC DIASTOLIC CONGESTIVE HEART FAILURE (HCC): ICD-10-CM

## 2022-08-30 DIAGNOSIS — Z00.00 MEDICARE ANNUAL WELLNESS VISIT, SUBSEQUENT: Primary | ICD-10-CM

## 2022-08-30 DIAGNOSIS — I10 ESSENTIAL HYPERTENSION: ICD-10-CM

## 2022-08-30 DIAGNOSIS — M54.50 CHRONIC MIDLINE LOW BACK PAIN WITHOUT SCIATICA: ICD-10-CM

## 2022-08-30 DIAGNOSIS — I27.82 OTHER CHRONIC PULMONARY EMBOLISM WITHOUT ACUTE COR PULMONALE (HCC): ICD-10-CM

## 2022-08-30 DIAGNOSIS — E78.00 PURE HYPERCHOLESTEROLEMIA: Chronic | ICD-10-CM

## 2022-08-30 PROCEDURE — G0439 PPPS, SUBSEQ VISIT: HCPCS | Performed by: FAMILY MEDICINE

## 2022-08-30 PROCEDURE — 99213 OFFICE O/P EST LOW 20 MIN: CPT | Performed by: FAMILY MEDICINE

## 2022-08-30 PROCEDURE — 1123F ACP DISCUSS/DSCN MKR DOCD: CPT | Performed by: FAMILY MEDICINE

## 2022-08-30 ASSESSMENT — PATIENT HEALTH QUESTIONNAIRE - PHQ9
SUM OF ALL RESPONSES TO PHQ QUESTIONS 1-9: 0
2. FEELING DOWN, DEPRESSED OR HOPELESS: 0
SUM OF ALL RESPONSES TO PHQ QUESTIONS 1-9: 0
SUM OF ALL RESPONSES TO PHQ QUESTIONS 1-9: 0
1. LITTLE INTEREST OR PLEASURE IN DOING THINGS: 0
SUM OF ALL RESPONSES TO PHQ9 QUESTIONS 1 & 2: 0
SUM OF ALL RESPONSES TO PHQ QUESTIONS 1-9: 0

## 2022-08-30 ASSESSMENT — ENCOUNTER SYMPTOMS
BACK PAIN: 0
CHEST TIGHTNESS: 0
ABDOMINAL PAIN: 1
SORE THROAT: 0
EYE PAIN: 0
CONSTIPATION: 0
NAUSEA: 0
COUGH: 0
TROUBLE SWALLOWING: 0
BLOOD IN STOOL: 0
SHORTNESS OF BREATH: 0

## 2022-08-30 ASSESSMENT — LIFESTYLE VARIABLES
HOW MANY STANDARD DRINKS CONTAINING ALCOHOL DO YOU HAVE ON A TYPICAL DAY: 1 OR 2
HOW OFTEN DO YOU HAVE A DRINK CONTAINING ALCOHOL: 2-4 TIMES A MONTH

## 2022-08-30 NOTE — PATIENT INSTRUCTIONS
Personalized Preventive Plan for Evelyne Franco - 8/30/2022  Medicare offers a range of preventive health benefits. Some of the tests and screenings are paid in full while other may be subject to a deductible, co-insurance, and/or copay. Some of these benefits include a comprehensive review of your medical history including lifestyle, illnesses that may run in your family, and various assessments and screenings as appropriate. After reviewing your medical record and screening and assessments performed today your provider may have ordered immunizations, labs, imaging, and/or referrals for you. A list of these orders (if applicable) as well as your Preventive Care list are included within your After Visit Summary for your review. Other Preventive Recommendations:    A preventive eye exam performed by an eye specialist is recommended every 1-2 years to screen for glaucoma; cataracts, macular degeneration, and other eye disorders. A preventive dental visit is recommended every 6 months. Try to get at least 150 minutes of exercise per week or 10,000 steps per day on a pedometer . Order or download the FREE \"Exercise & Physical Activity: Your Everyday Guide\" from [employer]. Call 9-510.203.6114 or search [employer] online. You need 3724-5350 mg of calcium and 3290-9199 IU of vitamin D per day. It is possible to meet your calcium requirement with diet alone, but a vitamin D supplement is usually necessary to meet this goal.  When exposed to the sun, use a sunscreen that protects against both UVA and UVB radiation with an SPF of 30 or greater. Reapply every 2 to 3 hours or after sweating, drying off with a towel, or swimming. Always wear a seat belt when traveling in a car. Always wear a helmet when riding a bicycle or motorcycle.         Probiotic PILLS

## 2022-08-30 NOTE — PROGRESS NOTES
Medicare Annual Wellness Visit    Corin Franco is here for Medicare AWV    Assessment & Plan           ICD-10-CM    1. Medicare annual wellness visit, subsequent  Z00.00       2. Essential hypertension  I10 ME OFFICE OUTPATIENT VISIT 15 MINUTES [45386]      3. Chronic midline low back pain without sciatica  M54.50     G89.29       4. Obstructive sleep apnea syndrome  G47.33       5. Gastroesophageal reflux disease without esophagitis  K21.9 ME OFFICE OUTPATIENT VISIT 15 MINUTES [84359]      6. Pure hypercholesterolemia  E78.00       7. Chronic diastolic congestive heart failure (HCC)  I50.32       8. Coronary artery disease involving native coronary artery of native heart without angina pectoris  I25.10       9. Other chronic pulmonary embolism without acute cor pulmonale (HCC)  I27.82       10. Generalized abdominal pain  R10.84 ME OFFICE OUTPATIENT VISIT 15 MINUTES [56339]            Subjective       Patient's complete Health Risk Assessment and screening values have been reviewed and are found in Flowsheets. The following problems were reviewed today and where indicated follow up appointments were made and/or referrals ordered.     Positive Risk Factor Screenings with Interventions:    Fall Risk:  Do you feel unsteady or are you worried about falling? : (!) yes  2 or more falls in past year?: no  Fall with injury in past year?: no   Fall Risk Interventions:    Home safety tips provided            General Health and ACP:  General  In general, how would you say your health is?: Fair  In the past 7 days, have you experienced any of the following: New or Increased Pain, New or Increased Fatigue, Loneliness, Social Isolation, Stress or Anger?: (!) Yes  Select all that apply: (!) New or Increased Fatigue  Do you get the social and emotional support that you need?: (!) No  Do you have a Living Will?: Yes    Advance Directives       Power of  Living Will ACP-Advance Directive ACP-Power of     Not on tablet by mouth 2 times daily for 10 days Yes Checo Ronquillo MD   atorvastatin (LIPITOR) 80 MG tablet TAKE 1 TABLET BY MOUTH EVERY DAY Yes Checo Ronquillo MD   pantoprazole (PROTONIX) 40 MG tablet TAKE 1 TABLET BY MOUTH EVERY DAY Yes Checo Ronquillo MD   metoprolol succinate (TOPROL XL) 25 MG extended release tablet TAKE 1/2 TABLET BY MOUTH ONE TIME A DAY Yes Checo Ronquillo MD   potassium chloride (KLOR-CON M) 20 MEQ extended release tablet TAKE 1 TABLET BY MOUTH EVERY DAY Yes Checo Ronquillo MD   spironolactone-hydroCHLOROthiazide (ALDACTAZIDE) 25-25 MG per tablet TAKE 1/2 Audria Dotter Yes Praveen Coughlin MD   warfarin (JANTOVEN) 5 MG tablet TAKE AS DIRECTED BY COUMADIN CLINIC Yes Checo Ronquillo MD   folic acid (FOLVITE) 1 MG tablet TAKE 1 TABLET BY MOUTH EVERY DAY Yes Checo Ronquillo MD   tamsulosin (FLOMAX) 0.4 MG capsule Take 1 capsule by mouth daily Yes Checo Ronquillo MD   tiZANidine (ZANAFLEX) 2 MG tablet Take 1 tablet by mouth 3 times daily as needed (BACK SPASM) Yes Checo Ronquillo MD   clotrimazole-betamethasone (LOTRISONE) 1-0.05 % cream Apply topically 2 times daily. Lotrisone 1-0.05% Cream Yes Checo Ronquillo MD   nitroGLYCERIN (NITROSTAT) 0.4 MG SL tablet up to max of 3 total doses. If no relief after 1 dose, call 911. Yes Ben Ruiz MD   Ascorbic Acid (VITAMIN C) 1000 MG tablet Take 1,000 mg by mouth daily Yes Historical Provider, MD   Multiple Vitamins-Minerals (MULTI COMPLETE PO) Take 1 tablet by mouth daily Yes Historical Provider, MD   Omega-3 Fatty Acids (FISH OIL) 1000 MG CAPS Take 3,000 mg by mouth daily Yes Historical Provider, MD   aspirin 81 MG EC tablet Take 81 mg by mouth daily.    Yes Historical Provider, MD Ring (Including outside providers/suppliers regularly involved in providing care):   Patient Care Team:  Checo Ronquillo MD as PCP - General (Family Medicine)  Checo Ronquillo MD as PCP - [facility] tablet Take 1 tablet by mouth 3 times daily as needed (BACK SPASM) 30 tablet 0    clotrimazole-betamethasone (LOTRISONE) 1-0.05 % cream Apply topically 2 times daily. Lotrisone 1-0.05% Cream 45 g 3    nitroGLYCERIN (NITROSTAT) 0.4 MG SL tablet up to max of 3 total doses. If no relief after 1 dose, call 911. 25 tablet 3    Ascorbic Acid (VITAMIN C) 1000 MG tablet Take 1,000 mg by mouth daily      Multiple Vitamins-Minerals (MULTI COMPLETE PO) Take 1 tablet by mouth daily      Omega-3 Fatty Acids (FISH OIL) 1000 MG CAPS Take 3,000 mg by mouth daily      aspirin 81 MG EC tablet Take 81 mg by mouth daily. No current facility-administered medications for this visit. No orders of the defined types were placed in this encounter. No results found for this visit on 08/30/22.          Stable       Augmentin  for  2  to 3  days and  drink  fluids    bland  diet     Probiotic   Subjective   SUBJECTIVE/OBJECTIVE:  HPI     Abdominal  pain    general  noted     and  diet  better    augmentin  and  better        Gerd  stable        Ashd  stable       Chf  diastolic  chf  stable     Hx  of  pe  noted   recurrent and  stable       Back pain  stable       Lipids  stable      Bph  stable        Past Medical History:   Diagnosis Date    CAD (coronary artery disease) 01/2006    cath with stent lad    Chronic back pain 2013      epidural block    Colon polyps 10/2005    Diverticulosis of colon     DVT of proximal leg (deep vein thrombosis) ([address] 12/04 2013    left     right  in 2013    DVT of proximal leg (deep vein thrombosis) [facility])     left     GERD (gastroesophageal reflux disease)     Kidney stone on left side 12/2020    left  side  in  2009  also    Pulmonary embolus ([address] 6-2013 and  1-2015     had   right  leg  dvt    S/P cardiac cath 2014 at [facility]- patent LAD stent done 2006 and 40 to 50% lesions in other coronaries 04/12/2019    S/P cardiac cath 4/17/19- large all ectatic vessel, lm-p, lad prox 40%, mid 50%, distal 40 distal stent -patent, lcx prox 40%, rca prox 30%, dist 40%, edp 11, ef 50%- med  RX 04/17/2019    SOB (shortness of breath) on exertion 06/08/2020    Unspecified sleep apnea 2013    cpap     Past Surgical History:   Procedure Laterality Date    BACK SURGERY  5-    Dr Jabari Alcala @ [facility]     [address]  2014       50%  lesion    bamdad    CARDIAC CATHETERIZATION  04/2019      dr Marie Williamson  and  diffuse  disease  30 to 50%    COLONOSCOPY  2012 2020    sheik    polyps   due   2025    CORONARY ANGIOPLASTY WITH STENT PLACEMENT  2006    LAD    EYE SURGERY  2011    bilateral    HERNIA REPAIR  21/28    umbilical    JOINT REPLACEMENT Right 04/2019     [place]  right  hip    VENA CAVA FILTER PLACEMENT  2016    placed  iin  1-2014  and  removed  5-2016     Social History     Socioeconomic History    Marital status:      Spouse name: Not on file    Number of children: Not on file    Years of education: Not on file    Highest education level: Not on file   Occupational History    Not on file   Tobacco Use    Smoking status: Never    Smokeless tobacco: Never   Vaping Use    Vaping Use: Never used   Substance and Sexual Activity    Alcohol use:  Yes     Alcohol/week: 3.3 standard drinks     Types: 4 Standard drinks or equivalent per week    Drug use: No    Sexual activity: Yes     Partners: Female   Other Topics Concern    Not on file   Social History Narrative    Not on file     Social Determinants of Health     Financial Resource Strain: Low Risk     Difficulty of Paying Living Expenses: Not hard at all   Food Insecurity: No Food Insecurity    Worried About Running Out of Food in the Last Year: Never true    Ran Out of Food in the Last Year: Never true   Transportation Needs: Not on file   Physical Activity: Insufficiently Active    Days of Exercise per Week: 2 days    Minutes of Exercise per Session: 20 min   Stress: Not on file   Social Connections: Not on file Intimate Partner Violence: Not on file   Housing Stability: Not on file     Family History   Problem Relation Age of Onset    Heart Disease Father         Review of Systems   Constitutional:  Negative for fatigue and fever. HENT:  Negative for congestion, ear pain, postnasal drip, sore throat and trouble swallowing. Eyes:  Negative for pain. Respiratory:  Negative for cough, chest tightness and shortness of breath. Cardiovascular:  Negative for chest pain, palpitations and leg swelling. Gastrointestinal:  Positive for abdominal pain (now  better). Negative for blood in stool, constipation and nausea. Genitourinary:  Negative for difficulty urinating, frequency and urgency. Musculoskeletal:  Negative for arthralgias, back pain, joint swelling and neck stiffness. Skin:  Negative for rash. Neurological:  Negative for dizziness, weakness and headaches. Hematological:  Negative for adenopathy. Does not bruise/bleed easily. Psychiatric/Behavioral:  Negative for behavioral problems, dysphoric mood and sleep disturbance. /76 (Site: Right Upper Arm, Position: Sitting, Cuff Size: Medium Adult)   Pulse 68   Resp 16   Ht 5' 10\" (1.778 m)   Wt 223 lb 4 oz (101.3 kg)   BMI 32.03 kg/m²    Objective   Physical Exam  Vitals and nursing note reviewed. Constitutional:       Appearance: He is well-developed. HENT:      Head: Normocephalic and atraumatic. Right Ear: External ear normal.      Left Ear: External ear normal.      Nose: Nose normal.   Eyes:      Conjunctiva/sclera: Conjunctivae normal.      Pupils: Pupils are equal, round, and reactive to light. Comments: Fundi nl   Neck:      Thyroid: No thyromegaly. Cardiovascular:      Rate and Rhythm: Normal rate and regular rhythm. Heart sounds: Normal heart sounds. Pulmonary:      Effort: Pulmonary effort is normal.      Breath sounds: Normal breath sounds. No wheezing or rales.    Abdominal:      General: Bowel sounds are normal.      Palpations: Abdomen is soft. There is no mass. Tenderness: There is no abdominal tenderness. Comments:     Minimal pain and    umbilical  hernia  and  with  no pain   Musculoskeletal:         General: Normal range of motion. Cervical back: Normal range of motion and neck supple. Lymphadenopathy:      Cervical: No cervical adenopathy. Skin:     General: Skin is warm and dry. Findings: No rash. Neurological:      Mental Status: He is alert and oriented to person, place, and time. Cranial Nerves: No cranial nerve deficit. Deep Tendon Reflexes: Reflexes are normal and symmetric. An electronic signature was used to authenticate this note.     --Janell Wick MD

## 2022-09-12 ENCOUNTER — NURSE ONLY (OUTPATIENT)
Dept: LAB | Age: 77
End: 2022-09-12

## 2022-09-12 ENCOUNTER — OFFICE VISIT (OUTPATIENT)
Dept: FAMILY MEDICINE CLINIC | Age: 77
End: 2022-09-12

## 2022-09-12 VITALS
DIASTOLIC BLOOD PRESSURE: 76 MMHG | BODY MASS INDEX: 30.49 KG/M2 | RESPIRATION RATE: 18 BRPM | SYSTOLIC BLOOD PRESSURE: 130 MMHG | HEART RATE: 68 BPM | WEIGHT: 213 LBS | HEIGHT: 70 IN

## 2022-09-12 DIAGNOSIS — K59.01 SLOW TRANSIT CONSTIPATION: ICD-10-CM

## 2022-09-12 DIAGNOSIS — K57.30 DIVERTICULOSIS OF COLON: ICD-10-CM

## 2022-09-12 DIAGNOSIS — I25.10 CORONARY ARTERY DISEASE INVOLVING NATIVE CORONARY ARTERY OF NATIVE HEART WITHOUT ANGINA PECTORIS: ICD-10-CM

## 2022-09-12 DIAGNOSIS — R10.84 GENERALIZED ABDOMINAL PAIN: Primary | ICD-10-CM

## 2022-09-12 DIAGNOSIS — R10.84 GENERALIZED ABDOMINAL PAIN: ICD-10-CM

## 2022-09-12 DIAGNOSIS — I10 ESSENTIAL HYPERTENSION: ICD-10-CM

## 2022-09-12 LAB
BACTERIA URINE, POC: ABNORMAL
BASOPHILS # BLD: 0.6 %
BASOPHILS ABSOLUTE: 0 THOU/MM3 (ref 0–0.1)
BILIRUBIN URINE: 0 MG/DL
BLOOD, URINE: NEGATIVE
C-REACTIVE PROTEIN: < 0.3 MG/DL (ref 0–1)
CASTS URINE, POC: ABNORMAL
CLARITY: CLEAR
COLOR: ABNORMAL
CRYSTALS URINE, POC: ABNORMAL
EOSINOPHIL # BLD: 2.1 %
EOSINOPHILS ABSOLUTE: 0.2 THOU/MM3 (ref 0–0.4)
EPI CELLS URINE, POC: ABNORMAL
ERYTHROCYTE [DISTWIDTH] IN BLOOD BY AUTOMATED COUNT: 12.7 % (ref 11.5–14.5)
ERYTHROCYTE [DISTWIDTH] IN BLOOD BY AUTOMATED COUNT: 43.3 FL (ref 35–45)
GLUCOSE URINE: ABNORMAL
HCT VFR BLD CALC: 48 % (ref 42–52)
HEMOGLOBIN: 16.4 GM/DL (ref 14–18)
IMMATURE GRANS (ABS): 0.01 THOU/MM3 (ref 0–0.07)
IMMATURE GRANULOCYTES: 0.1 %
KETONES, URINE: NEGATIVE
LEUKOCYTE EST, POC: 70
LYMPHOCYTES # BLD: 19.8 %
LYMPHOCYTES ABSOLUTE: 1.5 THOU/MM3 (ref 1–4.8)
MCH RBC QN AUTO: 31.7 PG (ref 26–33)
MCHC RBC AUTO-ENTMCNC: 34.2 GM/DL (ref 32.2–35.5)
MCV RBC AUTO: 92.7 FL (ref 80–94)
MONOCYTES # BLD: 9.9 %
MONOCYTES ABSOLUTE: 0.8 THOU/MM3 (ref 0.4–1.3)
NITRITE, URINE: NEGATIVE
NUCLEATED RED BLOOD CELLS: 0 /100 WBC
PH UA: 5 (ref 4.5–8)
PLATELET # BLD: 238 THOU/MM3 (ref 130–400)
PMV BLD AUTO: 10.4 FL (ref 9.4–12.4)
PROTEIN UA: ABNORMAL
RBC # BLD: 5.18 MILL/MM3 (ref 4.7–6.1)
RBC URINE, POC: ABNORMAL
SEDIMENTATION RATE, ERYTHROCYTE: 7 MM/HR (ref 0–10)
SEG NEUTROPHILS: 67.5 %
SEGMENTED NEUTROPHILS ABSOLUTE COUNT: 5.3 THOU/MM3 (ref 1.8–7.7)
SPECIFIC GRAVITY UA: 1 (ref 1–1.03)
UROBILINOGEN, URINE: NORMAL
WBC # BLD: 7.8 THOU/MM3 (ref 4.8–10.8)
WBC URINE, POC: ABNORMAL
YEAST URINE, POC: ABNORMAL

## 2022-09-12 PROCEDURE — 99213 OFFICE O/P EST LOW 20 MIN: CPT | Performed by: FAMILY MEDICINE

## 2022-09-12 PROCEDURE — 81000 URINALYSIS NONAUTO W/SCOPE: CPT | Performed by: FAMILY MEDICINE

## 2022-09-12 PROCEDURE — 1123F ACP DISCUSS/DSCN MKR DOCD: CPT | Performed by: FAMILY MEDICINE

## 2022-09-12 RX ORDER — POLYETHYLENE GLYCOL 3350 17 G/17G
17 POWDER, FOR SOLUTION ORAL DAILY
Qty: 510 G | Refills: 0 | Status: SHIPPED | OUTPATIENT
Start: 2022-09-12 | End: 2022-10-12

## 2022-09-12 ASSESSMENT — ENCOUNTER SYMPTOMS
SORE THROAT: 0
BACK PAIN: 0
EYE PAIN: 0
CONSTIPATION: 1
SHORTNESS OF BREATH: 0
ABDOMINAL PAIN: 1
DIARRHEA: 0
TROUBLE SWALLOWING: 0
COUGH: 0
FLATUS: 1
NAUSEA: 0
BLOOD IN STOOL: 0
CHEST TIGHTNESS: 0
VOMITING: 0

## 2022-09-12 NOTE — PROGRESS NOTES
Subjective:      Patient ID: Lucio Hodgkin is a 65 y.o. male. Abdominal Pain  This is a new problem. The problem occurs daily. The problem has been gradually worsening. The pain is located in the epigastric region. The quality of the pain is aching. The abdominal pain radiates to the periumbilical region. Associated symptoms include arthralgias, constipation and flatus. Pertinent negatives include no diarrhea, dysuria, fever, frequency, headaches, nausea or vomiting. He has tried oral narcotic analgesics for the symptoms. The treatment provided no relief. Past Medical History:   Diagnosis Date    CAD (coronary artery disease) 01/2006    cath with stent lad    Chronic back pain 2013      epidural block    Colon polyps 10/2005    Diverticulosis of colon     DVT of proximal leg (deep vein thrombosis) ([address] 12/04 2013    left     right  in 2013    DVT [facility] proximal leg (deep vein thrombosis[facility]     left     GERD (gastroesophageal reflux disease)     Kidney stone on left side 12/2020    left  side  in  2009  also    Pulmonary embolus ([address] 6-2013 and  1-2015     had   right  leg  dvt    S/P cardiac cath 2014 at [facility]- patent LAD stent done 2006 and 40 to 50% lesions in other coronaries 04/12/2019    S/P cardiac cath 4/17/19- large all ectatic vessel, lm-p, lad prox 40%, mid 50%, distal 40 distal stent -patent, lcx prox 40%, rca prox 30%, dist 40%, edp 11, ef 50%- med  RX 04/17/2019    SOB (shortness of breath) on exertion 06/08/2020    Unspecified sleep apnea 2013    cpap      Review of Systems   Constitutional:  Negative for fatigue and fever. HENT:  Negative for congestion, ear pain, postnasal drip, sore throat and trouble swallowing. Eyes:  Negative for pain. Respiratory:  Negative for cough, chest tightness and shortness of breath. Cardiovascular:  Negative for chest pain, palpitations and leg swelling. Gastrointestinal:  Positive for abdominal pain, constipation and flatus.  Negative for blood in stool, diarrhea, nausea and vomiting. Bowel  last  time  moved well  was  5  days  ago    Genitourinary:  Negative for difficulty urinating, dysuria, frequency and urgency. Musculoskeletal:  Positive for arthralgias. Negative for back pain, joint swelling and neck stiffness. Skin:  Negative for rash. Neurological:  Negative for dizziness, weakness and headaches. Hematological:  Negative for adenopathy. Does not bruise/bleed easily. Psychiatric/Behavioral:  Negative for behavioral problems, dysphoric mood and sleep disturbance. /76 (Site: Right Upper Arm, Position: Sitting, Cuff Size: Medium Adult)   Pulse 68   Resp 18   Ht 5' 10\" (1.778 m)   Wt 213 lb (96.6 kg)   BMI 30.56 kg/m²    Physical Exam  Vitals and nursing note reviewed. Constitutional:       Appearance: He is well-developed. HENT:      Head: Normocephalic and atraumatic. Right Ear: External ear normal.      Left Ear: External ear normal.      Nose: Nose normal.   Eyes:      Conjunctiva/sclera: Conjunctivae normal.      Pupils: Pupils are equal, round, and reactive to light. Comments: Fundi nl   Neck:      Thyroid: No thyromegaly. Cardiovascular:      Rate and Rhythm: Normal rate and regular rhythm. Heart sounds: Normal heart sounds. Pulmonary:      Effort: Pulmonary effort is normal.      Breath sounds: Normal breath sounds. No wheezing or rales. Abdominal:      General: Bowel sounds are normal.      Palpations: Abdomen is soft. There is no mass. Tenderness: There is abdominal tenderness. Comments:  Left  side  with pain   Musculoskeletal:         General: Normal range of motion. Cervical back: Normal range of motion and neck supple. Lymphadenopathy:      Cervical: No cervical adenopathy. Skin:     General: Skin is warm and dry. Findings: No rash. Neurological:      Mental Status: He is alert and oriented to person, place, and time.       Cranial Nerves: No cranial nerve deficit. Deep Tendon Reflexes: Reflexes are normal and symmetric. Assessment:           ICD-10-CM    1. Generalized abdominal pain  R10.84 POC URINE with Microscopic     CBC with Auto Differential     C-Reactive Protein     Sedimentation Rate      2. Slow transit constipation  K59.01       3. Essential hypertension  I10       4. Diverticulosis of colon  K57.30       5. Coronary artery disease involving native coronary artery of native heart without angina pectoris  I25.10              Plan:      Current Outpatient Medications   Medication Sig Dispense Refill    polyethylene glycol (GLYCOLAX) 17 GM/SCOOP powder Take 17 g by mouth daily 510 g 0    linaCLOtide (LINZESS) 72 MCG CAPS capsule Take 1 capsule by mouth every morning (before breakfast) 4 capsule 0    atorvastatin (LIPITOR) 80 MG tablet TAKE 1 TABLET BY MOUTH EVERY DAY 90 tablet 0    metoprolol succinate (TOPROL XL) 25 MG extended release tablet TAKE 1/2 TABLET BY MOUTH ONE TIME A DAY 45 tablet 0    potassium chloride (KLOR-CON M) 20 MEQ extended release tablet TAKE 1 TABLET BY MOUTH EVERY DAY 90 tablet 0    spironolactone-hydroCHLOROthiazide (ALDACTAZIDE) 25-25 MG per tablet TAKE 1/2 TABLET BY MOUTH ONE TIME A DAY 45 tablet 3    warfarin (JANTOVEN) 5 MG tablet TAKE AS DIRECTED BY COUMADIN CLINIC 90 tablet 2    folic acid (FOLVITE) 1 MG tablet TAKE 1 TABLET BY MOUTH EVERY DAY 60 tablet 5    tamsulosin (FLOMAX) 0.4 MG capsule Take 1 capsule by mouth daily 30 capsule 0    tiZANidine (ZANAFLEX) 2 MG tablet Take 1 tablet by mouth 3 times daily as needed (BACK SPASM) 30 tablet 0    clotrimazole-betamethasone (LOTRISONE) 1-0.05 % cream Apply topically 2 times daily. Lotrisone 1-0.05% Cream 45 g 3    nitroGLYCERIN (NITROSTAT) 0.4 MG SL tablet up to max of 3 total doses.  If no relief after 1 dose, call 911. 25 tablet 3    Ascorbic Acid (VITAMIN C) 1000 MG tablet Take 1,000 mg by mouth daily      Multiple Vitamins-Minerals (MULTI COMPLETE PO) Take 1 tablet by mouth daily      Omega-3 Fatty Acids (FISH OIL) 1000 MG CAPS Take 3,000 mg by mouth daily      aspirin 81 MG EC tablet Take 81 mg by mouth daily. pantoprazole (PROTONIX) 40 MG tablet TAKE 1 TABLET BY MOUTH EVERY DAY (Patient not taking: Reported on 9/12/2022) 90 tablet 0     No current facility-administered medications for this visit.           Orders Placed This Encounter   Procedures    CBC with Auto Differential     Standing Status:   Future     Standing Expiration Date:   9/12/2023    C-Reactive Protein     Standing Status:   Future     Standing Expiration Date:   9/12/2023    Sedimentation Rate     Standing Status:   Future     Standing Expiration Date:   9/12/2023    POC URINE with Microscopic          Get lab  and  linzess  for  4  days  and  use  miralax a nd  check lab    Karolina Abdalla MD

## 2022-09-13 ENCOUNTER — TELEPHONE (OUTPATIENT)
Dept: FAMILY MEDICINE CLINIC | Age: 77
End: 2022-09-13

## 2022-09-13 NOTE — TELEPHONE ENCOUNTER
----- Message from Roseanne Matt MD sent at 9/13/2022  1:06 AM EDT -----  Call all labs are normal and sed rate so do not feel infection

## 2022-09-17 ENCOUNTER — HOSPITAL ENCOUNTER (EMERGENCY)
Age: 77
Discharge: HOME | End: 2022-09-17
Attending: STUDENT IN AN ORGANIZED HEALTH CARE EDUCATION/TRAINING PROGRAM
Payer: MEDICARE

## 2022-09-17 ENCOUNTER — APPOINTMENT (OUTPATIENT)
Dept: CT IMAGING | Age: 77
End: 2022-09-17
Payer: MEDICARE

## 2022-09-17 VITALS
RESPIRATION RATE: 14 BRPM | SYSTOLIC BLOOD PRESSURE: 99 MMHG | OXYGEN SATURATION: 98 % | TEMPERATURE: 98 F | DIASTOLIC BLOOD PRESSURE: 71 MMHG | HEART RATE: 74 BPM

## 2022-09-17 DIAGNOSIS — N30.00 ACUTE CYSTITIS WITHOUT HEMATURIA: Primary | ICD-10-CM

## 2022-09-17 DIAGNOSIS — R10.84 GENERALIZED ABDOMINAL PAIN: ICD-10-CM

## 2022-09-17 DIAGNOSIS — K59.00 CONSTIPATION, UNSPECIFIED CONSTIPATION TYPE: ICD-10-CM

## 2022-09-17 LAB
ALBUMIN SERPL-MCNC: 3.9 G/DL (ref 3.5–5.1)
ALP BLD-CCNC: 80 U/L (ref 38–126)
ALT SERPL-CCNC: 23 U/L (ref 11–66)
ANION GAP SERPL CALCULATED.3IONS-SCNC: 11 MEQ/L (ref 8–16)
AST SERPL-CCNC: 31 U/L (ref 5–40)
BACTERIA: ABNORMAL /HPF
BASOPHILS # BLD: 0.8 %
BASOPHILS ABSOLUTE: 0.1 THOU/MM3 (ref 0–0.1)
BILIRUB SERPL-MCNC: 1.1 MG/DL (ref 0.3–1.2)
BILIRUBIN URINE: NEGATIVE
BLOOD, URINE: NEGATIVE
BUN BLDV-MCNC: 15 MG/DL (ref 7–22)
CALCIUM SERPL-MCNC: 9.8 MG/DL (ref 8.5–10.5)
CASTS 2: ABNORMAL /LPF
CASTS UA: ABNORMAL /LPF
CHARACTER, URINE: CLEAR
CHLORIDE BLD-SCNC: 100 MEQ/L (ref 98–111)
CO2: 25 MEQ/L (ref 23–33)
COLOR: ABNORMAL
CREAT SERPL-MCNC: 0.9 MG/DL (ref 0.4–1.2)
CRYSTALS, UA: ABNORMAL
EOSINOPHIL # BLD: 1.6 %
EOSINOPHILS ABSOLUTE: 0.1 THOU/MM3 (ref 0–0.4)
EPITHELIAL CELLS, UA: ABNORMAL /HPF
ERYTHROCYTE [DISTWIDTH] IN BLOOD BY AUTOMATED COUNT: 12.8 % (ref 11.5–14.5)
ERYTHROCYTE [DISTWIDTH] IN BLOOD BY AUTOMATED COUNT: 42.6 FL (ref 35–45)
GFR SERPL CREATININE-BSD FRML MDRD: 82 ML/MIN/1.73M2
GLUCOSE BLD-MCNC: 118 MG/DL (ref 70–108)
GLUCOSE URINE: NEGATIVE MG/DL
HCT VFR BLD CALC: 47 % (ref 42–52)
HEMOGLOBIN: 16.3 GM/DL (ref 14–18)
IMMATURE GRANS (ABS): 0.03 THOU/MM3 (ref 0–0.07)
IMMATURE GRANULOCYTES: 0.3 %
KETONES, URINE: NEGATIVE
LEUKOCYTE ESTERASE, URINE: ABNORMAL
LIPASE: 46.4 U/L (ref 5.6–51.3)
LYMPHOCYTES # BLD: 15.3 %
LYMPHOCYTES ABSOLUTE: 1.3 THOU/MM3 (ref 1–4.8)
MCH RBC QN AUTO: 32 PG (ref 26–33)
MCHC RBC AUTO-ENTMCNC: 34.7 GM/DL (ref 32.2–35.5)
MCV RBC AUTO: 92.2 FL (ref 80–94)
MISCELLANEOUS 2: ABNORMAL
MONOCYTES # BLD: 12.7 %
MONOCYTES ABSOLUTE: 1.1 THOU/MM3 (ref 0.4–1.3)
NITRITE, URINE: NEGATIVE
NUCLEATED RED BLOOD CELLS: 0 /100 WBC
OSMOLALITY CALCULATION: 273.9 MOSMOL/KG (ref 275–300)
PH UA: 5.5 (ref 5–9)
PLATELET # BLD: 185 THOU/MM3 (ref 130–400)
PMV BLD AUTO: 10.3 FL (ref 9.4–12.4)
POTASSIUM REFLEX MAGNESIUM: 4 MEQ/L (ref 3.5–5.2)
PROTEIN UA: NEGATIVE
RBC # BLD: 5.1 MILL/MM3 (ref 4.7–6.1)
RBC URINE: ABNORMAL /HPF
REASON FOR REJECTION: NORMAL
REJECTED TEST: NORMAL
RENAL EPITHELIAL, UA: ABNORMAL
SEG NEUTROPHILS: 69.3 %
SEGMENTED NEUTROPHILS ABSOLUTE COUNT: 6.1 THOU/MM3 (ref 1.8–7.7)
SODIUM BLD-SCNC: 136 MEQ/L (ref 135–145)
SPECIFIC GRAVITY, URINE: 1.02 (ref 1–1.03)
TOTAL PROTEIN: 6.6 G/DL (ref 6.1–8)
TROPONIN T: < 0.01 NG/ML
UROBILINOGEN, URINE: 1 EU/DL (ref 0–1)
WBC # BLD: 8.8 THOU/MM3 (ref 4.8–10.8)
WBC UA: ABNORMAL /HPF
YEAST: ABNORMAL

## 2022-09-17 PROCEDURE — 6370000000 HC RX 637 (ALT 250 FOR IP): Performed by: STUDENT IN AN ORGANIZED HEALTH CARE EDUCATION/TRAINING PROGRAM

## 2022-09-17 PROCEDURE — 99215 OFFICE O/P EST HI 40 MIN: CPT

## 2022-09-17 PROCEDURE — 83690 ASSAY OF LIPASE: CPT

## 2022-09-17 PROCEDURE — 93005 ELECTROCARDIOGRAM TRACING: CPT | Performed by: NURSE PRACTITIONER

## 2022-09-17 PROCEDURE — 74176 CT ABD & PELVIS W/O CONTRAST: CPT

## 2022-09-17 PROCEDURE — 87086 URINE CULTURE/COLONY COUNT: CPT

## 2022-09-17 PROCEDURE — 85025 COMPLETE CBC W/AUTO DIFF WBC: CPT

## 2022-09-17 PROCEDURE — 81001 URINALYSIS AUTO W/SCOPE: CPT

## 2022-09-17 PROCEDURE — 99214 OFFICE O/P EST MOD 30 MIN: CPT | Performed by: NURSE PRACTITIONER

## 2022-09-17 PROCEDURE — 36415 COLL VENOUS BLD VENIPUNCTURE: CPT

## 2022-09-17 PROCEDURE — 80053 COMPREHEN METABOLIC PANEL: CPT

## 2022-09-17 PROCEDURE — 84484 ASSAY OF TROPONIN QUANT: CPT

## 2022-09-17 PROCEDURE — 6360000002 HC RX W HCPCS: Performed by: STUDENT IN AN ORGANIZED HEALTH CARE EDUCATION/TRAINING PROGRAM

## 2022-09-17 PROCEDURE — 96372 THER/PROPH/DIAG INJ SC/IM: CPT

## 2022-09-17 PROCEDURE — 99284 EMERGENCY DEPT VISIT MOD MDM: CPT

## 2022-09-17 RX ORDER — CEPHALEXIN 500 MG/1
500 CAPSULE ORAL 4 TIMES DAILY
Qty: 28 CAPSULE | Refills: 0 | Status: SHIPPED | OUTPATIENT
Start: 2022-09-17 | End: 2022-09-24

## 2022-09-17 RX ORDER — ACETAMINOPHEN 500 MG
1000 TABLET ORAL ONCE
Status: COMPLETED | OUTPATIENT
Start: 2022-09-17 | End: 2022-09-17

## 2022-09-17 RX ORDER — DICYCLOMINE HYDROCHLORIDE 10 MG/ML
20 INJECTION INTRAMUSCULAR ONCE
Status: COMPLETED | OUTPATIENT
Start: 2022-09-17 | End: 2022-09-17

## 2022-09-17 RX ADMIN — ACETAMINOPHEN 1000 MG: 500 TABLET ORAL at 19:17

## 2022-09-17 RX ADMIN — DICYCLOMINE HYDROCHLORIDE 20 MG: 20 INJECTION INTRAMUSCULAR at 16:11

## 2022-09-17 ASSESSMENT — PAIN DESCRIPTION - LOCATION
LOCATION: ABDOMEN

## 2022-09-17 ASSESSMENT — ENCOUNTER SYMPTOMS
RHINORRHEA: 0
COUGH: 0
EYE REDNESS: 0
EYE PAIN: 0
RECTAL PAIN: 0
EYE DISCHARGE: 0
WHEEZING: 0
TROUBLE SWALLOWING: 0
VOMITING: 0
SHORTNESS OF BREATH: 0
DIARRHEA: 0
SORE THROAT: 0
ALLERGIC/IMMUNOLOGIC NEGATIVE: 1
BACK PAIN: 0
ANAL BLEEDING: 0
ABDOMINAL DISTENTION: 1
NAUSEA: 1
NAUSEA: 0
BLOOD IN STOOL: 0
CONSTIPATION: 1
ABDOMINAL PAIN: 1

## 2022-09-17 ASSESSMENT — PAIN SCALES - GENERAL
PAINLEVEL_OUTOF10: 8
PAINLEVEL_OUTOF10: 5
PAINLEVEL_OUTOF10: 5

## 2022-09-17 ASSESSMENT — PAIN - FUNCTIONAL ASSESSMENT: PAIN_FUNCTIONAL_ASSESSMENT: 0-10

## 2022-09-17 NOTE — ED PROVIDER NOTES
5-2016         MEDICATIONS   No current facility-administered medications for this encounter. Current Outpatient Medications:     cephALEXin (KEFLEX) 500 MG capsule, Take 1 capsule by mouth 4 times daily for 7 days, Disp: 28 capsule, Rfl: 0    polyethylene glycol (GLYCOLAX) 17 GM/SCOOP powder, Take 17 g by mouth daily, Disp: 510 g, Rfl: 0    linaCLOtide (LINZESS) 72 MCG CAPS capsule, Take 1 capsule by mouth every morning (before breakfast), Disp: 4 capsule, Rfl: 0    atorvastatin (LIPITOR) 80 MG tablet, TAKE 1 TABLET BY MOUTH EVERY DAY, Disp: 90 tablet, Rfl: 0    pantoprazole (PROTONIX) 40 MG tablet, TAKE 1 TABLET BY MOUTH EVERY DAY (Patient not taking: Reported on 9/12/2022), Disp: 90 tablet, Rfl: 0    metoprolol succinate (TOPROL XL) 25 MG extended release tablet, TAKE 1/2 TABLET BY MOUTH ONE TIME A DAY, Disp: 45 tablet, Rfl: 0    potassium chloride (KLOR-CON M) 20 MEQ extended release tablet, TAKE 1 TABLET BY MOUTH EVERY DAY, Disp: 90 tablet, Rfl: 0    spironolactone-hydroCHLOROthiazide (ALDACTAZIDE) 25-25 MG per tablet, TAKE 1/2 TABLET BY MOUTH ONE TIME A DAY, Disp: 45 tablet, Rfl: 3    warfarin (JANTOVEN) 5 MG tablet, TAKE AS DIRECTED BY COUMADIN CLINIC, Disp: 90 tablet, Rfl: 2    folic acid (FOLVITE) 1 MG tablet, TAKE 1 TABLET BY MOUTH EVERY DAY, Disp: 60 tablet, Rfl: 5    tamsulosin (FLOMAX) 0.4 MG capsule, Take 1 capsule by mouth daily, Disp: 30 capsule, Rfl: 0    tiZANidine (ZANAFLEX) 2 MG tablet, Take 1 tablet by mouth 3 times daily as needed (BACK SPASM), Disp: 30 tablet, Rfl: 0    clotrimazole-betamethasone (LOTRISONE) 1-0.05 % cream, Apply topically 2 times daily. Lotrisone 1-0.05% Cream, Disp: 45 g, Rfl: 3    nitroGLYCERIN (NITROSTAT) 0.4 MG SL tablet, up to max of 3 total doses.  If no relief after 1 dose, call 911., Disp: 25 tablet, Rfl: 3    Ascorbic Acid (VITAMIN C) 1000 MG tablet, Take 1,000 mg by mouth daily, Disp: , Rfl:     Multiple Vitamins-Minerals (MULTI COMPLETE PO), Take 1 tablet by mouth daily, Disp: , Rfl:     Omega-3 Fatty Acids (FISH OIL) 1000 MG CAPS, Take 3,000 mg by mouth daily, Disp: , Rfl:     aspirin 81 MG EC tablet, Take 81 mg by mouth daily. , Disp: , Rfl:       SOCIAL HISTORY     Social History     Social History Narrative    Not on file     Social History     Tobacco Use    Smoking status: Never    Smokeless tobacco: Never   Vaping Use    Vaping Use: Never used   Substance Use Topics    Alcohol use: Yes     Alcohol/week: 3.3 standard drinks     Types: 4 Standard drinks or equivalent per week    Drug use: No         ALLERGIES   No Known Allergies      FAMILY HISTORY     Family History   Problem Relation Age of Onset    Heart Disease Father          PREVIOUS RECORDS   Previous records reviewed. PHYSICAL EXAM     ED Triage Vitals [09/17/22 1202]   BP Temp Temp src Heart Rate Resp SpO2 Height Weight   (!) 153/76 98 °F (36.7 °C) -- 84 19 97 % -- --     Initial vital signs and nursing assessment reviewed and abnormal from elevated bp . There is no height or weight on file to calculate BMI. Pulsoximetry is normal per my interpretation. Additional Vital Signs:  Vitals:    09/17/22 1733   BP: 96/69   Pulse: 69   Resp: 19   Temp:    SpO2: 93%       Physical Exam  HENT:      Head: Normocephalic and atraumatic. Mouth/Throat:      Mouth: Mucous membranes are moist.      Pharynx: Oropharynx is clear. Eyes:      Extraocular Movements: Extraocular movements intact. Pupils: Pupils are equal, round, and reactive to light. Cardiovascular:      Rate and Rhythm: Normal rate and regular rhythm. Heart sounds: Normal heart sounds. Pulmonary:      Effort: Pulmonary effort is normal.      Breath sounds: Normal breath sounds. Abdominal:      General: Abdomen is flat. Bowel sounds are normal.      Palpations: Abdomen is soft. Tenderness: There is abdominal tenderness in the periumbilical area and suprapubic area.  There is no right CVA tenderness, left CVA tenderness, guarding or rebound. Negative signs include Eddy's sign, Rovsing's sign, McBurney's sign, psoas sign and obturator sign. Hernia: A hernia is present. Hernia is present in the umbilical area (umbilical hernia repair present). Neurological:      Mental Status: He is alert. MEDICAL DECISION MAKING   Initial Assessment:   Mr. Rommel Narvaez is a 71-year-old male who presents to the ED for worsening abdominal pain over the last 3 weeks. [facility] [place]. There they did a abdominal CT that was significant for large gallstone, enlarged prostate, diverticulosis. He was sent to [facility]  for further evaluation. Plan: Will give Bentyl injection for abdominal pain at this time  Check CBC, CMP, lipase, troponin as well as UA and urine culture  EKG not concerning for ACS will not repeat at this time. ED RESULTS   Laboratory results:  Labs Reviewed   COMPREHENSIVE METABOLIC PANEL W/ REFLEX TO MG FOR LOW K - Abnormal; Notable for the following components:       Result Value    Glucose 118 (*)     All other components within normal limits   GLOMERULAR FILTRATION RATE, ESTIMATED - Abnormal; Notable for the following components:    Est, Glom Filt Rate 82 (*)     All other components within normal limits   OSMOLALITY - Abnormal; Notable for the following components:    Osmolality Calc 273.9 (*)     All other components within normal limits   URINE WITH REFLEXED MICRO - Abnormal; Notable for the following components:    Leukocyte Esterase, Urine MODERATE (*)     Color, UA DK YELLOW (*)     All other components within normal limits   CULTURE, REFLEXED, URINE   CBC WITH AUTO DIFFERENTIAL   SPECIMEN REJECTION   LIPASE   TROPONIN   ANION GAP       Radiologic studies results:  CT ABDOMEN PELVIS WO CONTRAST   Final Result      1. 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 2. Stent in the right coronary artery. 3. Large gallstone present.    4. Atrophic pancreas. 5. 3 mm calcification upper pole of left kidney. No associated hydronephrosis. 6. Atherosclerotic calcification in the abdominal aorta, iliac and visceral arteries. 6. Enlarged prostate gland. Slightly thick-walled bladder. 7. Diverticulosis involving the colon with no evidence for acute diverticulitis. 8. Right hip replacement. 9. Lumbar spondylosis. **This report has been created using voice recognition software. It may contain minor errors which are inherent in voice recognition technology. **      Final report electronically signed by DR Kwasi Chan on 9/17/2022 1:17 PM          ED Medications administered this visit:   Medications   dicyclomine (BENTYL) injection 20 mg (20 mg IntraMUSCular Given 9/17/22 1611)         ED COURSE      Laboratory work was significant for slightly decreased GFR 82, UA with moderate leukocyte Estrace as well as 10-15 white blood cells. Due to patient's abdominal pain, constipation, enlarged prostate we will treat as complicated UTI. Due to kidney function will start Keflex at this time and await culture and sensitivities. Strict return precautions and follow up instructions were discussed with the patient prior to discharge, with which the patient agrees. MEDICATION CHANGES     New Prescriptions    CEPHALEXIN (KEFLEX) 500 MG CAPSULE    Take 1 capsule by mouth 4 times daily for 7 days         FINAL DISPOSITION     Final diagnoses:   Generalized abdominal pain   Constipation, unspecified constipation type   Acute cystitis without hematuria     Condition: condition: stable  Dispo: Discharge to home      This transcription was electronically signed. Parts of this transcriptions may have been dictated by use of voice recognition software and electronically transcribed, and parts may have been transcribed with the assistance of an ED scribe. The transcription may contain errors not detected in proofreading.   Please refer to my supervising physician's documentation if my documentation differs.     Electronically Signed: Jhon Jeffrey MD, 09/17/22, 6:23 PM         Jhon Jeffrey MD  Resident  09/17/22 0213

## 2022-09-17 NOTE — ED NOTES
Pt resting on cot with no signs of distress. Updated on POC.       [facility] KYLAH CHAIREZ  09/17/22 3060

## 2022-09-17 NOTE — ED NOTES
Urine sample obtained and sent. Pt back on cot at this time with unlabored respirations.       Magalis CAMP RN  09/17/22 5703

## 2022-09-17 NOTE — ED PROVIDER NOTES
[address]  Urgent Care Encounter      CHIEF COMPLAINT       Chief Complaint   Patient presents with    Abdominal Pain    Constipation       Nurses Notes reviewed and I agree except as noted in the HPI. HISTORY OF PRESENT ILLNESS   Naomi Hooker is a 65 y.o. male who presents with complaint of ongoing generalized abdominal pain and constipation. Patient states he has been seen 3 or 4 times in the last month for this complaint and was recently put on narcotics for pain management. CRP was completed and it was found to be normal.  Patient states that his laxative occasionally provide him relief of the constipation, he denies any hematochezia, bloody or foamy stools. States he is lost at least 35 pounds in the last 5 weeks. Also has episodes of mid back pain when he lies down at nighttime that radiates up into the back of his neck. Denies fever or chills but often feels cold. REVIEW OF SYSTEMS     Review of Systems   Constitutional:  Positive for unexpected weight change. Negative for activity change, fatigue and fever. HENT:  Negative for congestion, ear pain, rhinorrhea, sore throat and trouble swallowing. Eyes:  Negative for pain, discharge and redness. Respiratory:  Negative for cough, shortness of breath and wheezing. Cardiovascular: Negative. Gastrointestinal:  Positive for abdominal pain and constipation. Negative for diarrhea, nausea and vomiting. Endocrine: Negative. Genitourinary:  Negative for dysuria, frequency and urgency. Musculoskeletal:  Negative for arthralgias, back pain and myalgias. Skin:  Negative for rash. Allergic/Immunologic: Negative. Neurological:  Negative for dizziness, tremors, weakness and headaches. Hematological: Negative. Psychiatric/Behavioral:  Negative for dysphoric mood and sleep disturbance. The patient is not nervous/anxious.       PAST MEDICAL HISTORY         Diagnosis Date    CAD (coronary artery disease) 01/2006 cath with stent lad    Chronic back pain 2013      epidural block    Colon polyps 10/2005    Diverticulosis of colon     DVT of proximal leg (deep vein thrombosis) ([address] 12/04  2013    left     right  in 2013    DVT of proximal leg (deep vein thrombosis) ([facility])     left     GERD (gastroesophageal reflux disease)     Kidney stone on left side 12/2020    left  side  in  2009  also    Pulmonary embolus ([address] 6-2013 and  1-2015     had   right  leg  dvt    S/P cardiac cath 2014 at [facility]- patent LAD stent done 2006 and 40 to 50% lesions in other coronaries 04/12/2019    S/P cardiac cath 4/17/19- large all ectatic vessel, lm-p, lad prox 40%, mid 50%, distal 40 distal stent -patent, lcx prox 40%, rca prox 30%, dist 40%, edp 11, ef 50%- med  RX 04/17/2019    SOB (shortness of breath) on exertion 06/08/2020    Unspecified sleep apnea 2013    cpap       SURGICAL HISTORY     Patient  has a past surgical history that includes Coronary angioplasty with stent (2006); hernia repair (11/06); eye surgery (2011); Cardiac catheterization (2014); Colonoscopy (2012 2020); back surgery (5-); Vena Cava Filter Placement (2016); joint replacement (Right, 04/2019); and Cardiac catheterization (04/2019). CURRENT MEDICATIONS       Previous Medications    ASCORBIC ACID (VITAMIN C) 1000 MG TABLET    Take 1,000 mg by mouth daily    ASPIRIN 81 MG EC TABLET    Take 81 mg by mouth daily. ATORVASTATIN (LIPITOR) 80 MG TABLET    TAKE 1 TABLET BY MOUTH EVERY DAY    CLOTRIMAZOLE-BETAMETHASONE (LOTRISONE) 1-0.05 % CREAM    Apply topically 2 times daily.  Lotrisone 2-1.71% Cream    FOLIC ACID (FOLVITE) 1 MG TABLET    TAKE 1 TABLET BY MOUTH EVERY DAY    LINACLOTIDE (LINZESS) 72 MCG CAPS CAPSULE    Take 1 capsule by mouth every morning (before breakfast)    METOPROLOL SUCCINATE (TOPROL XL) 25 MG EXTENDED RELEASE TABLET    TAKE 1/2 TABLET BY MOUTH ONE TIME A DAY    MULTIPLE VITAMINS-MINERALS (MULTI COMPLETE PO)    Take 1 tablet by mouth daily NITROGLYCERIN (NITROSTAT) 0.4 MG SL TABLET    up to max of 3 total doses. If no relief after 1 dose, call 911. OMEGA-3 FATTY ACIDS (FISH OIL) 1000 MG CAPS    Take 3,000 mg by mouth daily    PANTOPRAZOLE (PROTONIX) 40 MG TABLET    TAKE 1 TABLET BY MOUTH EVERY DAY    POLYETHYLENE GLYCOL (GLYCOLAX) 17 GM/SCOOP POWDER    Take 17 g by mouth daily    POTASSIUM CHLORIDE (KLOR-CON M) 20 MEQ EXTENDED RELEASE TABLET    TAKE 1 TABLET BY MOUTH EVERY DAY    SPIRONOLACTONE-HYDROCHLOROTHIAZIDE (ALDACTAZIDE) 25-25 MG PER TABLET    TAKE 1/2 TABLET BY MOUTH ONE TIME A DAY    TAMSULOSIN (FLOMAX) 0.4 MG CAPSULE    Take 1 capsule by mouth daily    TIZANIDINE (ZANAFLEX) 2 MG TABLET    Take 1 tablet by mouth 3 times daily as needed (BACK SPASM)    WARFARIN (JANTOVEN) 5 MG TABLET    TAKE AS DIRECTED BY COUMADIN CLINIC       ALLERGIES     Patient is has No Known Allergies. FAMILY HISTORY     Patient'sfamily history includes Heart Disease in his father. SOCIAL HISTORY     Patient  reports that he has never smoked. He has never used smokeless tobacco. He reports current alcohol use of about 3.3 standard drinks per week. He reports that he does not use drugs. PHYSICAL EXAM     ED TRIAGE VITALS  BP: (!) 153/76, Temp: 98 °F (36.7 °C), Heart Rate: 84, Resp: 19, SpO2: 97 %  Physical Exam  Vitals reviewed. Constitutional:       General: He is not in acute distress. Appearance: Normal appearance. He is well-developed. He is ill-appearing. He is not toxic-appearing or diaphoretic. HENT:      Head: Normocephalic. No right periorbital erythema or left periorbital erythema. Jaw: No trismus. Right Ear: Hearing, tympanic membrane, ear canal and external ear normal.      Left Ear: Hearing, tympanic membrane, ear canal and external ear normal.      Nose: Nose normal. No mucosal edema or rhinorrhea. Mouth/Throat:      Mouth: Mucous membranes are moist.      Dentition: Normal dentition. Pharynx: Uvula midline.  No posterior oropharyngeal erythema. Tonsils: No tonsillar exudate. 0 on the right. 0 on the left. Eyes:      General: Lids are normal.         Right eye: No discharge. Left eye: No discharge. Conjunctiva/sclera: Conjunctivae normal.      Right eye: Right conjunctiva is not injected. No chemosis. Left eye: No chemosis. Pupils: Pupils are equal, round, and reactive to light. Neck:      Vascular: No JVD. Trachea: Trachea normal.   Cardiovascular:      Rate and Rhythm: Normal rate and regular rhythm. Heart sounds: Normal heart sounds. No murmur heard. Pulmonary:      Effort: Pulmonary effort is normal. No respiratory distress. Breath sounds: Normal breath sounds. No stridor. No wheezing. Abdominal:      General: Bowel sounds are normal. There is no distension. Palpations: Abdomen is soft. Tenderness: There is generalized abdominal tenderness. There is no right CVA tenderness, left CVA tenderness or guarding. Musculoskeletal:         General: No tenderness or signs of injury. Normal range of motion. Cervical back: Full passive range of motion without pain and normal range of motion. Lymphadenopathy:      Cervical: No cervical adenopathy. Skin:     General: Skin is warm and dry. Capillary Refill: Capillary refill takes less than 2 seconds. Findings: No rash. Neurological:      Mental Status: He is alert and oriented to person, place, and time. GCS: GCS eye subscore is 4. GCS verbal subscore is 5. GCS motor subscore is 6. Cranial Nerves: No cranial nerve deficit. Coordination: Coordination normal.      Gait: Gait normal.   Psychiatric:         Mood and Affect: Mood is not anxious or depressed. Speech: Speech normal.         Behavior: Behavior normal. Behavior is not withdrawn or hyperactive. Behavior is cooperative. Thought Content:  Thought content normal.         Judgment: Judgment normal.       DIAGNOSTIC RESULTS Labs:   Results for orders placed or performed during the hospital encounter of 09/17/22   EKG 12 Lead   Result Value Ref Range    Ventricular Rate 64 BPM    Atrial Rate 64 BPM    P-R Interval 168 ms    QRS Duration 72 ms    Q-T Interval 396 ms    QTc Calculation (Bazett) 408 ms    R Axis -12 degrees    T Axis -2 degrees       IMAGING:    URGENT CARE COURSE:     Vitals:    09/17/22 1202   BP: (!) 153/76   Pulse: 84   Resp: 19   Temp: 98 °F (36.7 °C)   SpO2: 97%       Medications - No data to display  PROCEDURES:  None  FINAL IMPRESSION      1. Generalized abdominal pain    2. Unexplained weight loss    3. Constipation, unspecified constipation type        DISPOSITION/PLAN   DISPOSITION Decision To Transfer 09/17/2022 01:34:12 PM    CT scan completed while at the urgent care; only concerning finding was that of large gallstone of undetermined size. EKG completed with no acute concerning findings. Due to patient's ongoing pain, significant weight loss, and generally ill appearance, he is transferred to the emergency department at [facility] for further evaluation. He is taken by squad per his request.  He is very stable on discharge by [email or web address]. Attempted call to the emergency department for report with no answer after 10 minutes, assuming very busy. PATIENT REFERRED TO:  No follow-up provider specified.   DISCHARGE MEDICATIONS:  New Prescriptions    No medications on file     Current Discharge Medication List          MELI Randle CNP, APRN - CNP  09/17/22 2710

## 2022-09-17 NOTE — ED NOTES
Pt resting on cot with no signs of distress.       Loren Curiel [facility] Clinic[facility], RN  09/17/22 9182

## 2022-09-17 NOTE — ED NOTES
This RN into discharge pt. Pt states that his stomach hurts even more now. Provider informed.       Gilford Hane AMQEBM, RN  09/17/22 2770

## 2022-09-17 NOTE — ED NOTES
Pt ambulatory to bathroom at this time. Tolerated well. Pt medicated per MAR.       Maria Del Rosario KUMARI RN  09/17/22 1859

## 2022-09-17 NOTE — DISCHARGE INSTRUCTIONS
Please take antibiotics 4 times a day for 7 days. Please continue to take MiraLAX daily. Okay to take Tylenol 500 mg every 4 hours as needed for abdominal pain. Please continue to drink lots of water. Please follow up with your pcp in the next couple of days.

## 2022-09-17 NOTE — ED NOTES
Pt to ED from  c/o abdominal pain and weight loss. Pt states in the last month his abdomen has been hurting and his appetite has been decreased resulting in weight loss. Pt reports generalized abdominal pain 5/10. Pt respirations unlabored. Monitor applied. IV inserted.       Jay ORTIZ RN  09/17/22 [id number]

## 2022-09-18 LAB
EKG ATRIAL RATE: 64 BPM
EKG P-R INTERVAL: 168 MS
EKG Q-T INTERVAL: 396 MS
EKG QRS DURATION: 72 MS
EKG QTC CALCULATION (BAZETT): 408 MS
EKG R AXIS: -12 DEGREES
EKG T AXIS: -2 DEGREES
EKG VENTRICULAR RATE: 64 BPM

## 2022-09-18 PROCEDURE — 93010 ELECTROCARDIOGRAM REPORT: CPT | Performed by: INTERNAL MEDICINE

## 2022-09-19 LAB
ORGANISM: ABNORMAL
URINE CULTURE REFLEX: ABNORMAL

## 2022-09-30 ENCOUNTER — HOSPITAL ENCOUNTER (EMERGENCY)
Age: 77
Discharge: HOME | End: 2022-09-30
Payer: MEDICARE

## 2022-09-30 ENCOUNTER — APPOINTMENT (OUTPATIENT)
Dept: CT IMAGING | Age: 77
End: 2022-09-30
Payer: MEDICARE

## 2022-09-30 ENCOUNTER — TELEPHONE (OUTPATIENT)
Dept: FAMILY MEDICINE CLINIC | Age: 77
End: 2022-09-30

## 2022-09-30 VITALS
SYSTOLIC BLOOD PRESSURE: 101 MMHG | OXYGEN SATURATION: 97 % | BODY MASS INDEX: 30.21 KG/M2 | HEART RATE: 75 BPM | TEMPERATURE: 98.3 F | RESPIRATION RATE: 16 BRPM | DIASTOLIC BLOOD PRESSURE: 63 MMHG | HEIGHT: 69 IN | WEIGHT: 204 LBS

## 2022-09-30 DIAGNOSIS — R10.84 GENERALIZED ABDOMINAL PAIN: Primary | ICD-10-CM

## 2022-09-30 DIAGNOSIS — R79.1 SUPRATHERAPEUTIC INR: ICD-10-CM

## 2022-09-30 DIAGNOSIS — R97.20 ELEVATED PSA: ICD-10-CM

## 2022-09-30 DIAGNOSIS — R19.5 LOOSE STOOLS: ICD-10-CM

## 2022-09-30 DIAGNOSIS — K57.30 DIVERTICULOSIS OF COLON: Primary | ICD-10-CM

## 2022-09-30 LAB
ALBUMIN SERPL-MCNC: 3.8 G/DL (ref 3.5–5.1)
ALP BLD-CCNC: 80 U/L (ref 38–126)
ALT SERPL-CCNC: 27 U/L (ref 11–66)
ANION GAP SERPL CALCULATED.3IONS-SCNC: 11 MEQ/L (ref 8–16)
AST SERPL-CCNC: 30 U/L (ref 5–40)
BACTERIA: ABNORMAL /HPF
BASOPHILS # BLD: 0.4 %
BASOPHILS ABSOLUTE: 0 THOU/MM3 (ref 0–0.1)
BILIRUB SERPL-MCNC: 0.9 MG/DL (ref 0.3–1.2)
BILIRUBIN URINE: NEGATIVE
BLOOD, URINE: NEGATIVE
BUN BLDV-MCNC: 14 MG/DL (ref 7–22)
CALCIUM SERPL-MCNC: 9.7 MG/DL (ref 8.5–10.5)
CASTS 2: ABNORMAL /LPF
CASTS UA: ABNORMAL /LPF
CHARACTER, URINE: CLEAR
CHLORIDE BLD-SCNC: 101 MEQ/L (ref 98–111)
CO2: 26 MEQ/L (ref 23–33)
COLOR: ABNORMAL
CREAT SERPL-MCNC: 0.8 MG/DL (ref 0.4–1.2)
CRYSTALS, UA: ABNORMAL
EKG ATRIAL RATE: 74 BPM
EKG P AXIS: 20 DEGREES
EKG P-R INTERVAL: 150 MS
EKG Q-T INTERVAL: 366 MS
EKG QRS DURATION: 68 MS
EKG QTC CALCULATION (BAZETT): 406 MS
EKG R AXIS: 38 DEGREES
EKG T AXIS: -3 DEGREES
EKG VENTRICULAR RATE: 74 BPM
EOSINOPHIL # BLD: 1.5 %
EOSINOPHILS ABSOLUTE: 0.2 THOU/MM3 (ref 0–0.4)
EPITHELIAL CELLS, UA: ABNORMAL /HPF
ERYTHROCYTE [DISTWIDTH] IN BLOOD BY AUTOMATED COUNT: 13.1 % (ref 11.5–14.5)
ERYTHROCYTE [DISTWIDTH] IN BLOOD BY AUTOMATED COUNT: 44.1 FL (ref 35–45)
GFR SERPL CREATININE-BSD FRML MDRD: > 90 ML/MIN/1.73M2
GLUCOSE BLD-MCNC: 116 MG/DL (ref 70–108)
GLUCOSE URINE: NEGATIVE MG/DL
HCT VFR BLD CALC: 44 % (ref 42–52)
HEMOGLOBIN: 15.2 GM/DL (ref 14–18)
IMMATURE GRANS (ABS): 0.03 THOU/MM3 (ref 0–0.07)
IMMATURE GRANULOCYTES: 0.3 %
INR BLD: 3.7 (ref 0.85–1.13)
KETONES, URINE: ABNORMAL
LACTIC ACID: 2.1 MMOL/L (ref 0.5–2)
LEUKOCYTE ESTERASE, URINE: ABNORMAL
LIPASE: 63.3 U/L (ref 5.6–51.3)
LYMPHOCYTES # BLD: 9.4 %
LYMPHOCYTES ABSOLUTE: 1 THOU/MM3 (ref 1–4.8)
MCH RBC QN AUTO: 31.9 PG (ref 26–33)
MCHC RBC AUTO-ENTMCNC: 34.5 GM/DL (ref 32.2–35.5)
MCV RBC AUTO: 92.2 FL (ref 80–94)
MISCELLANEOUS 2: ABNORMAL
MONOCYTES # BLD: 7.3 %
MONOCYTES ABSOLUTE: 0.8 THOU/MM3 (ref 0.4–1.3)
NITRITE, URINE: NEGATIVE
NUCLEATED RED BLOOD CELLS: 0 /100 WBC
OSMOLALITY CALCULATION: 277.1 MOSMOL/KG (ref 275–300)
PH UA: 5.5 (ref 5–9)
PLATELET # BLD: 154 THOU/MM3 (ref 130–400)
PMV BLD AUTO: 9.9 FL (ref 9.4–12.4)
POTASSIUM REFLEX MAGNESIUM: 4.1 MEQ/L (ref 3.5–5.2)
PROSTATE SPECIFIC ANTIGEN: 4.94 NG/ML (ref 0–1)
PROTEIN UA: NEGATIVE
RBC # BLD: 4.77 MILL/MM3 (ref 4.7–6.1)
RBC URINE: ABNORMAL /HPF
RENAL EPITHELIAL, UA: ABNORMAL
SEG NEUTROPHILS: 81.1 %
SEGMENTED NEUTROPHILS ABSOLUTE COUNT: 8.8 THOU/MM3 (ref 1.8–7.7)
SODIUM BLD-SCNC: 138 MEQ/L (ref 135–145)
SPECIFIC GRAVITY, URINE: 1.02 (ref 1–1.03)
TOTAL PROTEIN: 6 G/DL (ref 6.1–8)
UROBILINOGEN, URINE: 1 EU/DL (ref 0–1)
WBC # BLD: 10.9 THOU/MM3 (ref 4.8–10.8)
WBC UA: ABNORMAL /HPF
YEAST: ABNORMAL

## 2022-09-30 PROCEDURE — 74174 CTA ABD&PLVS W/CONTRAST: CPT

## 2022-09-30 PROCEDURE — 99284 EMERGENCY DEPT VISIT MOD MDM: CPT

## 2022-09-30 PROCEDURE — 96374 THER/PROPH/DIAG INJ IV PUSH: CPT

## 2022-09-30 PROCEDURE — 36415 COLL VENOUS BLD VENIPUNCTURE: CPT

## 2022-09-30 PROCEDURE — C9113 INJ PANTOPRAZOLE SODIUM, VIA: HCPCS | Performed by: PHYSICIAN ASSISTANT

## 2022-09-30 PROCEDURE — 93005 ELECTROCARDIOGRAM TRACING: CPT | Performed by: PHYSICIAN ASSISTANT

## 2022-09-30 PROCEDURE — 2580000003 HC RX 258: Performed by: PHYSICIAN ASSISTANT

## 2022-09-30 PROCEDURE — 85610 PROTHROMBIN TIME: CPT

## 2022-09-30 PROCEDURE — 80053 COMPREHEN METABOLIC PANEL: CPT

## 2022-09-30 PROCEDURE — 6360000004 HC RX CONTRAST MEDICATION: Performed by: PHYSICIAN ASSISTANT

## 2022-09-30 PROCEDURE — 93010 ELECTROCARDIOGRAM REPORT: CPT | Performed by: NUCLEAR MEDICINE

## 2022-09-30 PROCEDURE — 85025 COMPLETE CBC W/AUTO DIFF WBC: CPT

## 2022-09-30 PROCEDURE — 83605 ASSAY OF LACTIC ACID: CPT

## 2022-09-30 PROCEDURE — 84153 ASSAY OF PSA TOTAL: CPT

## 2022-09-30 PROCEDURE — 6360000002 HC RX W HCPCS: Performed by: PHYSICIAN ASSISTANT

## 2022-09-30 PROCEDURE — 81001 URINALYSIS AUTO W/SCOPE: CPT

## 2022-09-30 PROCEDURE — 83690 ASSAY OF LIPASE: CPT

## 2022-09-30 PROCEDURE — 51798 US URINE CAPACITY MEASURE: CPT

## 2022-09-30 PROCEDURE — 6370000000 HC RX 637 (ALT 250 FOR IP): Performed by: PHYSICIAN ASSISTANT

## 2022-09-30 RX ORDER — ALUMINUM HYDROXIDE, MAGNESIUM HYDROXIDE, SIMETHICONE 400; 400; 40 MG/10ML; MG/10ML; MG/10ML
5 SUSPENSION ORAL 4 TIMES DAILY PRN
Qty: 355 ML | Refills: 0 | Status: SHIPPED | OUTPATIENT
Start: 2022-09-30

## 2022-09-30 RX ORDER — PANTOPRAZOLE SODIUM 40 MG/10ML
40 INJECTION, POWDER, LYOPHILIZED, FOR SOLUTION INTRAVENOUS ONCE
Status: COMPLETED | OUTPATIENT
Start: 2022-09-30 | End: 2022-09-30

## 2022-09-30 RX ORDER — PANTOPRAZOLE SODIUM 20 MG/1
40 TABLET, DELAYED RELEASE ORAL DAILY
Qty: 60 TABLET | Refills: 0 | Status: SHIPPED | OUTPATIENT
Start: 2022-09-30

## 2022-09-30 RX ORDER — 0.9 % SODIUM CHLORIDE 0.9 %
500 INTRAVENOUS SOLUTION INTRAVENOUS ONCE
Status: COMPLETED | OUTPATIENT
Start: 2022-09-30 | End: 2022-09-30

## 2022-09-30 RX ORDER — AMOXICILLIN AND CLAVULANATE POTASSIUM 875; 125 MG/1; MG/1
1 TABLET, FILM COATED ORAL 2 TIMES DAILY
Qty: 14 TABLET | Refills: 0 | Status: SHIPPED | OUTPATIENT
Start: 2022-09-30 | End: 2022-10-07

## 2022-09-30 RX ORDER — SUCRALFATE ORAL 1 G/10ML
1 SUSPENSION ORAL 4 TIMES DAILY
Qty: 1200 ML | Refills: 3 | Status: SHIPPED | OUTPATIENT
Start: 2022-09-30

## 2022-09-30 RX ADMIN — IOPAMIDOL 85 ML: 755 INJECTION, SOLUTION INTRAVENOUS at 15:25

## 2022-09-30 RX ADMIN — LIDOCAINE HYDROCHLORIDE: 20 SOLUTION ORAL; TOPICAL at 12:35

## 2022-09-30 RX ADMIN — PANTOPRAZOLE SODIUM 40 MG: 40 INJECTION, POWDER, FOR SOLUTION INTRAVENOUS at 12:33

## 2022-09-30 RX ADMIN — SODIUM CHLORIDE 500 ML: 9 INJECTION, SOLUTION INTRAVENOUS at 13:49

## 2022-09-30 ASSESSMENT — PAIN SCALES - GENERAL
PAINLEVEL_OUTOF10: 5
PAINLEVEL_OUTOF10: 6

## 2022-09-30 ASSESSMENT — ENCOUNTER SYMPTOMS: ABDOMINAL PAIN: 1

## 2022-09-30 ASSESSMENT — PAIN DESCRIPTION - LOCATION
LOCATION: ABDOMEN
LOCATION: ABDOMEN

## 2022-09-30 ASSESSMENT — PAIN - FUNCTIONAL ASSESSMENT
PAIN_FUNCTIONAL_ASSESSMENT: 0-10
PAIN_FUNCTIONAL_ASSESSMENT: 0-10

## 2022-09-30 NOTE — ED PROVIDER NOTES
[facility]  eMERGENCY dEPARTMENT eNCOUnter          CHIEF COMPLAINT       Chief Complaint   Patient presents with    Abdominal Pain       Nurses Notes reviewed and I agree except as noted inthe HPI. HISTORY OF PRESENT ILLNESS    Timothy Brower is a 65 y.o. male who presents to the Emergency Department for the evaluation of abdominal pain. Patient states that has been dealing with intermittent abdominal pain for about 4 weeks. He was initially being started on treatment for constipation and developed some subsequent back pain for which he was seen in the ED and diagnosed with a UTI. He was started on antibiotics, completed the course with resolution of the pain at that time. States that yesterday the pain recurred and got worse last night. He is now having multiple episodes of loose stools associated. States the pain is constant and generally associated any anterior part of the abdomen, not so much on the flanks. It is associated with decreased appetite, new bloating and just a general feeling of upset stomach. Patient was unable to sleep last night due to the pain. Reports he has had chills for the past few days with intermittent dysuria at times. States the pain is worse in the upper abdomen. Reports 30 pound weight loss over the past 4 to 5 weeks. Denies any visible bleeding. Reports rare nocturia and chronically decreased urine volume and stream.  Denies any associated fever, nausea, vomiting, melena, urinary frequency or urgency. The HPI was provided by the patient. REVIEW OF SYSTEMS     Review of Systems   Gastrointestinal:  Positive for abdominal pain. All other systems reviewed and are negative.     PAST MEDICAL HISTORY    has a past medical history of CAD (coronary artery disease), Chronic back pain, Colon polyps, Diverticulosis of colon, DVT of proximal leg (deep vein thrombosis) ([address] DVT of proximal leg (deep vein thrombosis) ([address] GERD (gastroesophageal reflux disease), Kidney stone on left side, Pulmonary embolus ([address] S/P cardiac cath 2014 at [facility]- patent LAD stent done 2006 and 40 to 50% lesions in other coronaries, S/P cardiac cath 4/17/19- large all ectatic vessel, lm-p, lad prox 40%, mid 50%, distal 40 distal stent -patent, lcx prox 40%, rca prox 30%, dist 40%, edp 11, ef 50%- med  RX, SOB (shortness of breath) on exertion, and Unspecified sleep apnea. SURGICAL HISTORY      has a past surgical history that includes Coronary angioplasty with stent (2006); hernia repair (11/06); eye surgery (2011); Cardiac catheterization (2014); Colonoscopy (2012 2020); back surgery (5-); Vena Cava Filter Placement (2016); joint replacement (Right, 04/2019); and Cardiac catheterization (04/2019).     CURRENT MEDICATIONS       Discharge Medication List as of 9/30/2022  4:33 PM        CONTINUE these medications which have NOT CHANGED    Details   polyethylene glycol (GLYCOLAX) 17 GM/SCOOP powder Take 17 g by mouth daily, Disp-510 g, R-0Normal      linaCLOtide (LINZESS) 72 MCG CAPS capsule Take 1 capsule by mouth every morning (before breakfast), Disp-4 capsule, R-0NO PRINT      atorvastatin (LIPITOR) 80 MG tablet TAKE 1 TABLET BY MOUTH EVERY DAY, Disp-90 tablet, R-0Normal      metoprolol succinate (TOPROL XL) 25 MG extended release tablet TAKE 1/2 TABLET BY MOUTH ONE TIME A DAY, Disp-45 tablet, R-0Normal      potassium chloride (KLOR-CON M) 20 MEQ extended release tablet TAKE 1 TABLET BY MOUTH EVERY DAY, Disp-90 tablet, R-0Normal      spironolactone-hydroCHLOROthiazide (ALDACTAZIDE) 25-25 MG per tablet TAKE 1/2 TABLET BY MOUTH ONE TIME A DAY, Disp-45 tablet, R-3Normal      warfarin (JANTOVEN) 5 MG tablet TAKE AS DIRECTED BY COUMADIN CLINIC, Disp-90 tablet, [id number]      folic acid (FOLVITE) 1 MG tablet TAKE 1 TABLET BY MOUTH EVERY DAY, Disp-60 tablet, R-5Normal      tamsulosin (FLOMAX) 0.4 MG capsule Take 1 capsule by mouth daily, Disp-30 capsule, R-0Normal tiZANidine (ZANAFLEX) 2 MG tablet Take 1 tablet by mouth 3 times daily as needed (BACK SPASM), Disp-30 tablet, R-0Normal      clotrimazole-betamethasone (LOTRISONE) 1-0.05 % cream Apply topically 2 times daily. Lotrisone 1-0.05% Cream, Disp-45 g,R-3, Normal      nitroGLYCERIN (NITROSTAT) 0.4 MG SL tablet up to max of 3 total doses. If no relief after 1 dose, call 911., Disp-25 tablet, R-3Normal      Ascorbic Acid (VITAMIN C) 1000 MG tablet Take 1,000 mg by mouth daily      Multiple Vitamins-Minerals (MULTI COMPLETE PO) Take 1 tablet by mouth daily      Omega-3 Fatty Acids (FISH OIL) 1000 MG CAPS Take 3,000 mg by mouth daily      aspirin 81 MG EC tablet Take 81 mg by mouth daily. ALLERGIES     has No Known Allergies. FAMILY HISTORY     He indicated that his mother is . He indicated that his father is . He indicated that his sister is alive. family history includes Heart Disease in his father. SOCIAL HISTORY      reports that he has never smoked. He has never used smokeless tobacco. He reports current alcohol use of about 3.3 standard drinks per week. He reports that he does not use drugs. PHYSICAL EXAM     INITIAL VITALS:  height is 5' 9\" (1.753 m) and weight is 204 lb (92.5 kg). His oral temperature is 98.3 °F (36.8 °C). His blood pressure is 101/63 and his pulse is 75. His respiration is 16 and oxygen saturation is 97%. Physical Exam  Vitals and nursing note reviewed. Constitutional:       Appearance: He is well-developed. HENT:      Head: Normocephalic and atraumatic. Cardiovascular:      Rate and Rhythm: Normal rate. Pulmonary:      Effort: Pulmonary effort is normal. No respiratory distress. Abdominal:      General: There is distension. Tenderness: There is generalized abdominal tenderness (worse in the upper abdomen). There is guarding. There is no right CVA tenderness or left CVA tenderness. Negative signs include Eddy's sign.       Hernia: A hernia is present. Hernia is present in the ventral area (tender but soft and reducible without overlying skin changes). Skin:     General: Skin is warm and dry. Neurological:      General: No focal deficit present. Mental Status: He is alert. Psychiatric:         Mood and Affect: Mood normal.       DIFFERENTIAL DIAGNOSIS:   Differential diagnoses are discussed    DIAGNOSTIC RESULTS     EKG: All EKG's are interpreted by the Emergency Department Physician who either signs or Co-signsthis chart in the absence of a cardiologist.    Vent. Rate: 74 bpm  PRinterval: 150 ms  QRS duration: 68 ms  QTc: 406 ms  P-R-T axes: 20, 38, -3  Sinus rhythm with PACS. No STEMI  Compared to old EKG on 9-17-22      RADIOLOGY: non-plain film images(s) such as CT, Ultrasound and MRI are read by the radiologist.    [address]   Final Result   1. Dilated loops of small bowel are seen centrally. Diffuse fluid distention of colonic loops are seen. The findings are felt to relate to an underlying diarrhea syndrome rather than small bowel obstruction. Clinical correlation is recommended. 2. Colonic diverticulosis. 3. Cholelithiasis. 4. No abdominal aortic aneurysm or dissection. 5. Ectasia of the infrarenal abdominal aorta at 2.7 cm.   6. 4 mm left renal calculus. 7. Other findings as described above. **This report has been created using voice recognition software. It may contain minor errors which are inherent in voice recognition technology. **      Final report electronically signed by Dr Chinedu Melgoza on 9/30/2022 4:06 PM          LABS:      Labs Reviewed   CBC WITH AUTO DIFFERENTIAL - Abnormal; Notable for the following components:       Result Value    WBC 10.9 (*)     Segs Absolute 8.8 (*)     All other components within normal limits   COMPREHENSIVE METABOLIC PANEL W/ REFLEX TO MG FOR LOW K - Abnormal; Notable for the following components:    Glucose 116 (*)     Total Protein 6.0 (*) All other components within normal limits   LIPASE - Abnormal; Notable for the following components:    Lipase 63.3 (*)     All other components within normal limits   LACTIC ACID - Abnormal; Notable for the following components:    Lactic Acid 2.1 (*)     All other components within normal limits   PROTIME-INR - Abnormal; Notable for the following components:    INR 3.70 (*)     All other components within normal limits   URINE WITH REFLEXED MICRO - Abnormal; Notable for the following components:    Ketones, Urine TRACE (*)     Leukocyte Esterase, Urine MODERATE (*)     Color, UA DK YELLOW (*)     All other components within normal limits   PSA PROSTATIC SPECIFIC ANTIGEN - Abnormal; Notable for the following components:    PSA 4.94 (*)     All other components within normal limits   ANION GAP   GLOMERULAR FILTRATION RATE, ESTIMATED   OSMOLALITY       EMERGENCY DEPARTMENT COURSE:   Vitals:    Vitals:    09/30/22 1232 09/30/22 1319 09/30/22 1420 09/30/22 1546   BP: 111/71 101/70 115/86 101/63   Pulse: 82 87 77 75   Resp: 16 18 16 16   Temp:       TempSrc:       SpO2: 100% 99% 99% 97%   Weight:       Height:          3:13 PM EDT: The patient was seen and evaluated. Patient presents for repeat evaluation of abdominal pain. He arrives with reassuring vital signs. He does have notably tender abdomen on examination. Reports history of similar episodes in the past.  Urinalysis did reveal moderate amount of leukocytes with 2-4 white blood cells with no bacteria on microscopy. He had 10,900 white blood cell count was mildly elevated lipase at 63 and lactic acid 2.1. Supratherapeutic INR 3.7 and PSA upward trending at 4.94.  CT of the abdomen showed dilated loops of small bowel seen centrally with diffuse fluid distention of colonic loops felt to relate to underlying diarrhea syndrome rather than small bowel obstruction. Colonic diverticulosis without diverticulitis was noted as well as cholelithiasis.   A 4 mm left renal calculus was noted as well. Patient was treated in the department with GI cocktail and Protonix as well as IV fluids. He did have some improvement in his pain with these medications. Given recent use of antibiotics, we did discuss potential benefit but also the risks of utilizing an antispasmodic like Bentyl as he is slightly higher risk for C. difficile. Nursing staff reports his stools here have been largely formed, not a good candidate for stool analysis at this time. However, should they progress to diarrhea, testing is advised. We discussed supportive treatment for discharge and patient is feeling much better, agreeable with this plan. Return precautions were discussed and he verbalized understanding. He will be discharged with Protonix, Carafate, Maalox. CRITICAL CARE:   None    CONSULTS:  None    PROCEDURES:  None    FINAL IMPRESSION      1. Generalized abdominal pain    2. Loose stools    3. Supratherapeutic INR    4.  Elevated PSA          DISPOSITION/PLAN   Discharge    PATIENT REFERRED TO:  Miguel Quinteros MD  [address]  [phone]    Call in 3 days      [address] [facility] [address] [address] EMERGENCY DEPT  [facility] [place] [address]  [phone]    If symptoms worsen    DISCHARGEMEDICATIONS:  Discharge Medication List as of 9/30/2022  4:33 PM        START taking these medications    Details   sucralfate (CARAFATE) 1 GM/10ML suspension Take 10 mLs by mouth 4 times daily, Disp-1200 mL, R-3Normal      Alum & Mag Hydroxide-Simeth (ALUMINUM-MAGNESIUM-SIMETHICONE) 306-769-46 MG/5ML SUSP Take 5 mLs by mouth 4 times daily as needed (stomach pain), Disp-355 mL, R-0Normal      pantoprazole (PROTONIX) 20 MG tablet Take 2 tablets by mouth daily, Disp-60 tablet, R-0Normal                    (Please note that portions of this note were completedwith a voice recognition program.  Efforts were made to edit the dictations but occasionally words are mis-transcribed.)       Eugenie Res Gallito Gomez PA-C  10/03/22 1813

## 2022-09-30 NOTE — ED NOTES
Upon first contact with patient this RN receives bedside shift report from Nguyen Mejias [facility]. Pt resting on cot with easy respirations. Updated on POC and need for urine sample.         Dameon TODD RN  09/30/22 5250

## 2022-09-30 NOTE — TELEPHONE ENCOUNTER
Per Verbal order of Dr. Mitchell Billing prescription to pharmacy for pt.      Date of last visit:  9/12/2022  Date of next visit:  10/4/2022    Requested Prescriptions     Signed Prescriptions Disp Refills    amoxicillin-clavulanate (AUGMENTIN) 875-125 MG per tablet 14 tablet 0     Sig: Take 1 tablet by mouth 2 times daily for 7 days     Authorizing Provider: Alexandr Redd     Ordering User: Ailin Loredo

## 2022-09-30 NOTE — TELEPHONE ENCOUNTER
Pt called said that he is having abdominal discomfort that makes him about double over in pain. Jasen Blood is having bowel movements, they are somewhat loose. Jasen Blood he is not having any urinary frequency but it is yellowish most of the time. However he said he does not drink water. I told him to definitely drink water. He was seen in ER on 9/17/22 and he was given an antibiotic and felt a lot better.     Amarjit Adkins

## 2022-09-30 NOTE — ED NOTES
Pt resting on cot with unlabored respirations. Pt updated on POC.       Suzanne JEFFERSXXJPIERCE RN  09/30/22 6393

## 2022-09-30 NOTE — ED NOTES
Pt up to bathroom with no signs of distress. Urine sample obtained. Pt reports having large BM with some abdominal pain relief.      Cookie Patient DENAE, KYLAH  09/30/22 9961

## 2022-09-30 NOTE — ED NOTES
Patient resting in bed. Respirations easy and unlabored. No distress noted. Call light within reach.        Brea Mode, RN  09/30/22 6745

## 2022-09-30 NOTE — ED TRIAGE NOTES
Presents to ED with c/o abdominal pain that started this morning. Denies nausea. Alert and oriented. Respirations easy and unlabored.

## 2022-10-03 NOTE — TELEPHONE ENCOUNTER
Patient went to er and ct scan abdomen no blockage  or bowel obstruction . Is he better . He needs to go to gi most likely as sees dr Luz Maria Amos. please call patient

## 2022-10-03 NOTE — TELEPHONE ENCOUNTER
Leroy Salmon informed by Jose M. He stated he is feeling better. The meds are really helping.  He will make an appt with Dr Lola Boeck @ [facility]

## 2022-10-04 ENCOUNTER — OFFICE VISIT (OUTPATIENT)
Dept: FAMILY MEDICINE CLINIC | Age: 77
End: 2022-10-04

## 2022-10-04 VITALS
HEART RATE: 76 BPM | WEIGHT: 216.5 LBS | RESPIRATION RATE: 16 BRPM | DIASTOLIC BLOOD PRESSURE: 76 MMHG | HEIGHT: 69 IN | SYSTOLIC BLOOD PRESSURE: 124 MMHG | BODY MASS INDEX: 32.07 KG/M2

## 2022-10-04 DIAGNOSIS — I10 ESSENTIAL HYPERTENSION: ICD-10-CM

## 2022-10-04 DIAGNOSIS — I25.10 CORONARY ARTERY DISEASE INVOLVING NATIVE CORONARY ARTERY OF NATIVE HEART WITHOUT ANGINA PECTORIS: ICD-10-CM

## 2022-10-04 DIAGNOSIS — R10.84 GENERALIZED ABDOMINAL PAIN: Primary | ICD-10-CM

## 2022-10-04 DIAGNOSIS — K80.20 CALCULUS OF GALLBLADDER WITHOUT CHOLECYSTITIS WITHOUT OBSTRUCTION: ICD-10-CM

## 2022-10-04 DIAGNOSIS — K57.30 DIVERTICULOSIS OF COLON: ICD-10-CM

## 2022-10-04 DIAGNOSIS — K21.9 GASTROESOPHAGEAL REFLUX DISEASE WITHOUT ESOPHAGITIS: ICD-10-CM

## 2022-10-04 PROCEDURE — 1123F ACP DISCUSS/DSCN MKR DOCD: CPT | Performed by: FAMILY MEDICINE

## 2022-10-04 PROCEDURE — 99213 OFFICE O/P EST LOW 20 MIN: CPT | Performed by: FAMILY MEDICINE

## 2022-10-04 ASSESSMENT — ENCOUNTER SYMPTOMS
EYE PAIN: 0
NAUSEA: 0
ABDOMINAL PAIN: 1
CHEST TIGHTNESS: 0
BACK PAIN: 0
CONSTIPATION: 0
BLOOD IN STOOL: 0
SORE THROAT: 0
TROUBLE SWALLOWING: 0
COUGH: 0
SHORTNESS OF BREATH: 0

## 2022-10-04 NOTE — PROGRESS NOTES
Subjective:      Patient ID: Manish Rodríguez is a 65 y.o. male. Weight  loss of    20  pounds  since  July      Does  have  gallstones     Abdominal Pain  This is a recurrent problem. The current episode started 1 to 4 weeks ago. The onset quality is sudden. The problem has been resolved. The pain is located in the generalized abdominal region. The pain is at a severity of 4/10. The pain is mild. The quality of the pain is aching and cramping. The abdominal pain radiates to the periumbilical region. Pertinent negatives include no arthralgias, constipation, fever, frequency, headaches or nausea. Associated symptoms comments: Alternating  stools  and  with  some  stool  changes . Nothing aggravates the pain. The pain is relieved by Passing flatus. He has tried antibiotics and proton pump inhibitors for the symptoms. The treatment provided moderate relief. Prior diagnostic workup includes CT scan. Past Medical History:   Diagnosis Date    CAD (coronary artery disease) 01/2006    cath with stent lad    Chronic back pain 2013      epidural block    Colon polyps 10/2005    Diverticulosis of colon     DVT of proximal leg (deep vein thrombosis) ([address] 12/04 2013    left     right  in 2013    DVT of proximal leg (deep vein thrombosis) ([facility])     left     GERD (gastroesophageal reflux disease)     Kidney stone on left side 12/2020    left  side  in  2009  also    Pulmonary embolus ([address] 6-2013 and  1-2015     had   right  leg  dvt    S/P cardiac cath 2014 at [facility]- patent LAD stent done 2006 and 40 to 50% lesions in other coronaries 04/12/2019    S/P cardiac cath 4/17/19- large all ectatic vessel, lm-p, lad prox 40%, mid 50%, distal 40 distal stent -patent, lcx prox 40%, rca prox 30%, dist 40%, edp 11, ef 50%- med  RX 04/17/2019    SOB (shortness of breath) on exertion 06/08/2020    Unspecified sleep apnea 2013    cpap      Review of Systems   Constitutional:  Negative for fatigue and fever.    HENT:  Negative for congestion, ear pain, postnasal drip, sore throat and trouble swallowing. Eyes:  Negative for pain. Respiratory:  Negative for cough, chest tightness and shortness of breath. Cardiovascular:  Negative for chest pain, palpitations and leg swelling. Gastrointestinal:  Positive for abdominal pain. Negative for blood in stool, constipation and nausea. Genitourinary:  Negative for difficulty urinating, frequency and urgency. Musculoskeletal:  Negative for arthralgias, back pain, joint swelling and neck stiffness. Skin:  Negative for rash. Neurological:  Negative for dizziness, weakness and headaches. Hematological:  Negative for adenopathy. Does not bruise/bleed easily. Psychiatric/Behavioral:  Negative for behavioral problems, dysphoric mood and sleep disturbance. /76 (Site: Right Upper Arm, Position: Sitting, Cuff Size: Medium Adult)   Pulse 76   Resp 16   Ht 5' 9\" (1.753 m)   Wt 216 lb 8 oz (98.2 kg)   BMI 31.97 kg/m²    Physical Exam  Vitals and nursing note reviewed. Constitutional:       Appearance: He is well-developed. HENT:      Head: Normocephalic and atraumatic. Right Ear: External ear normal.      Left Ear: External ear normal.      Nose: Nose normal.   Eyes:      Conjunctiva/sclera: Conjunctivae normal.      Pupils: Pupils are equal, round, and reactive to light. Comments: Fundi nl   Neck:      Thyroid: No thyromegaly. Cardiovascular:      Rate and Rhythm: Normal rate and regular rhythm. Heart sounds: Normal heart sounds. Pulmonary:      Effort: Pulmonary effort is normal.      Breath sounds: Normal breath sounds. No wheezing or rales. Abdominal:      General: Bowel sounds are normal.      Palpations: Abdomen is soft. There is no mass. Tenderness: There is no abdominal tenderness. Comments:   No pain  and  with  obesity   Musculoskeletal:         General: Normal range of motion.       Cervical back: Normal range of motion and neck supple. Lymphadenopathy:      Cervical: No cervical adenopathy. Skin:     General: Skin is warm and dry. Findings: No rash. Neurological:      Mental Status: He is alert and oriented to person, place, and time. Cranial Nerves: No cranial nerve deficit. Deep Tendon Reflexes: Reflexes are normal and symmetric. Assessment:        ICD-10-CM    1. Generalized abdominal pain  R10.84       2. Essential hypertension  I10       3. Diverticulosis of colon  K57.30       4. Coronary artery disease involving native coronary artery of native heart without angina pectoris  I25.10       5. Gastroesophageal reflux disease without esophagitis  K21.9       6.  Calculus of gallbladder without cholecystitis without obstruction  K80.20              Plan:      Current Outpatient Medications   Medication Sig Dispense Refill    amoxicillin-clavulanate (AUGMENTIN) 875-125 MG per tablet Take 1 tablet by mouth 2 times daily for 7 days 14 tablet 0    sucralfate (CARAFATE) 1 GM/10ML suspension Take 10 mLs by mouth 4 times daily 1200 mL 3    Alum & Mag Hydroxide-Simeth (ALUMINUM-MAGNESIUM-SIMETHICONE) 200-200-20 MG/5ML SUSP Take 5 mLs by mouth 4 times daily as needed (stomach pain) 355 mL 0    pantoprazole (PROTONIX) 20 MG tablet Take 2 tablets by mouth daily 60 tablet 0    polyethylene glycol (GLYCOLAX) 17 GM/SCOOP powder Take 17 g by mouth daily 510 g 0    linaCLOtide (LINZESS) 72 MCG CAPS capsule Take 1 capsule by mouth every morning (before breakfast) 4 capsule 0    atorvastatin (LIPITOR) 80 MG tablet TAKE 1 TABLET BY MOUTH EVERY DAY 90 tablet 0    metoprolol succinate (TOPROL XL) 25 MG extended release tablet TAKE 1/2 TABLET BY MOUTH ONE TIME A DAY 45 tablet 0    potassium chloride (KLOR-CON M) 20 MEQ extended release tablet TAKE 1 TABLET BY MOUTH EVERY DAY 90 tablet 0    spironolactone-hydroCHLOROthiazide (ALDACTAZIDE) 25-25 MG per tablet TAKE 1/2 TABLET BY MOUTH ONE TIME A DAY 45 tablet 3    warfarin (JANTOVEN) 5 MG tablet TAKE AS DIRECTED BY COUMADIN CLINIC 90 tablet 2    folic acid (FOLVITE) 1 MG tablet TAKE 1 TABLET BY MOUTH EVERY DAY 60 tablet 5    tamsulosin (FLOMAX) 0.4 MG capsule Take 1 capsule by mouth daily 30 capsule 0    tiZANidine (ZANAFLEX) 2 MG tablet Take 1 tablet by mouth 3 times daily as needed (BACK SPASM) 30 tablet 0    clotrimazole-betamethasone (LOTRISONE) 1-0.05 % cream Apply topically 2 times daily. Lotrisone 1-0.05% Cream 45 g 3    nitroGLYCERIN (NITROSTAT) 0.4 MG SL tablet up to max of 3 total doses. If no relief after 1 dose, call 911. 25 tablet 3    Ascorbic Acid (VITAMIN C) 1000 MG tablet Take 1,000 mg by mouth daily      Multiple Vitamins-Minerals (MULTI COMPLETE PO) Take 1 tablet by mouth daily      Omega-3 Fatty Acids (FISH OIL) 1000 MG CAPS Take 3,000 mg by mouth daily      aspirin 81 MG EC tablet Take 81 mg by mouth daily. No current facility-administered medications for this visit. Orders Placed This Encounter   Procedures    AFL - Mercedez Solorio MD, Gastroenterology, [facility] [address]          Pain  since  mid  august and  see  gi as  ?   Diverticulosis     Preston Juarez MD

## 2022-10-04 NOTE — PROGRESS NOTES
Referral with records faxed to [phone], they will contact the patient with an appt day and time.

## 2022-10-10 ENCOUNTER — TELEPHONE (OUTPATIENT)
Dept: FAMILY MEDICINE CLINIC | Age: 77
End: 2022-10-10

## 2022-10-10 NOTE — TELEPHONE ENCOUNTER
Bautista Anne (#: [phone]) and left a message to return call to Office to see if they received the Referral and Records faxed over on 10-4-2022

## 2022-10-10 NOTE — TELEPHONE ENCOUNTER
Pt called said that he has not heard from Dr Krissy Alfonso office regarding the referral that was done on 10/4/22.

## 2022-10-10 NOTE — TELEPHONE ENCOUNTER
[facility] Called back, they did receive the Referral and he is already an Established Patient so they will contact the patient with an appt day and time.

## 2022-11-18 ENCOUNTER — OFFICE VISIT (OUTPATIENT)
Dept: FAMILY MEDICINE CLINIC | Age: 77
End: 2022-11-18

## 2022-11-18 VITALS
WEIGHT: 223.25 LBS | SYSTOLIC BLOOD PRESSURE: 116 MMHG | BODY MASS INDEX: 33.06 KG/M2 | HEART RATE: 60 BPM | DIASTOLIC BLOOD PRESSURE: 68 MMHG | RESPIRATION RATE: 12 BRPM | HEIGHT: 69 IN

## 2022-11-18 DIAGNOSIS — I25.10 CORONARY ARTERY DISEASE INVOLVING NATIVE CORONARY ARTERY OF NATIVE HEART WITHOUT ANGINA PECTORIS: ICD-10-CM

## 2022-11-18 DIAGNOSIS — I10 ESSENTIAL HYPERTENSION: ICD-10-CM

## 2022-11-18 DIAGNOSIS — I50.32 CHRONIC DIASTOLIC CONGESTIVE HEART FAILURE (HCC): ICD-10-CM

## 2022-11-18 DIAGNOSIS — E78.00 PURE HYPERCHOLESTEROLEMIA: Chronic | ICD-10-CM

## 2022-11-18 DIAGNOSIS — M54.50 CHRONIC MIDLINE LOW BACK PAIN WITHOUT SCIATICA: ICD-10-CM

## 2022-11-18 DIAGNOSIS — Z95.820 S/P ANGIOPLASTY WITH STENT: ICD-10-CM

## 2022-11-18 DIAGNOSIS — K21.9 GASTROESOPHAGEAL REFLUX DISEASE WITHOUT ESOPHAGITIS: Primary | ICD-10-CM

## 2022-11-18 DIAGNOSIS — G89.29 CHRONIC MIDLINE LOW BACK PAIN WITHOUT SCIATICA: ICD-10-CM

## 2022-11-18 PROCEDURE — 1123F ACP DISCUSS/DSCN MKR DOCD: CPT | Performed by: FAMILY MEDICINE

## 2022-11-18 PROCEDURE — 99213 OFFICE O/P EST LOW 20 MIN: CPT | Performed by: FAMILY MEDICINE

## 2022-11-18 ASSESSMENT — ENCOUNTER SYMPTOMS
ABDOMINAL PAIN: 1
TROUBLE SWALLOWING: 0
EYE PAIN: 0
COUGH: 0
BELCHING: 0
BACK PAIN: 0
SORE THROAT: 0
NAUSEA: 0
CHEST TIGHTNESS: 0
BLOOD IN STOOL: 0
CONSTIPATION: 0
SHORTNESS OF BREATH: 0
DIARRHEA: 0

## 2022-11-18 NOTE — PROGRESS NOTES
Subjective:      Patient ID: Bhargavi Crowley is a 65 y.o. male. Egd  done and    gastric  polyp and  stable       Ashd   stable      Htn  stable     Abdominal Pain  This is a recurrent problem. Episode onset: now  resolved. The problem occurs rarely. The problem has been resolved. Pain location: pain  now  better. Pertinent negatives include no anorexia, arthralgias, belching, constipation, diarrhea, fever, frequency, headaches or nausea. Associated symptoms comments:   Stools  are now  better . Exacerbated by: overall beter. The treatment provided significant relief. Past Medical History:   Diagnosis Date    CAD (coronary artery disease) 01/2006    cath with stent lad    Chronic back pain 2013      epidural block    Colon polyps 10/2005    Diverticulosis of colon     DVT of proximal leg (deep vein thrombosis) ([address] 12/04 2013    left     right  in 2013    DVT of proximal leg (deep vein thrombosis) ([facility])     left     GERD (gastroesophageal reflux disease)     Kidney stone on left side 12/2020    left  side  in  2009  also    Pulmonary embolus ([address] 6-2013 and  1-2015     had   right  leg  dvt    S/P cardiac cath [facility] at [facility]- patent LAD stent done 2006 and 40 to 50% lesions in other coronaries 04/12/2019    S/P cardiac cath 4/17/19- large all ectatic vessel, lm-p, lad prox 40%, mid 50%, distal 40 distal stent -patent, lcx prox 40%, rca prox 30%, dist 40%, edp 11, ef 50%- med  RX 04/17/2019    SOB (shortness of breath) on exertion 06/08/2020    Unspecified sleep apnea 2013    cpap      Review of Systems   Constitutional:  Negative for fatigue and fever. HENT:  Negative for congestion, ear pain, postnasal drip, sore throat and trouble swallowing. Eyes:  Negative for pain. Respiratory:  Negative for cough, chest tightness and shortness of breath. Cardiovascular:  Negative for chest pain, palpitations and leg swelling. Gastrointestinal:  Positive for abdominal pain.  Negative for anorexia, blood in stool, constipation, diarrhea and nausea. Weight  now     normal  weight    Genitourinary:  Negative for difficulty urinating, frequency and urgency. Musculoskeletal:  Negative for arthralgias, back pain, joint swelling and neck stiffness. Skin:  Negative for rash. Neurological:  Negative for dizziness, weakness and headaches. Hematological:  Negative for adenopathy. Does not bruise/bleed easily. Psychiatric/Behavioral:  Negative for behavioral problems, dysphoric mood and sleep disturbance. /68 (Site: Right Upper Arm, Position: Sitting, Cuff Size: Medium Adult)   Pulse 60   Resp 12   Ht 5' 9\" (1.753 m)   Wt 223 lb 4 oz (101.3 kg)   BMI 32.97 kg/m²    Objective:   Physical Exam  Vitals and nursing note reviewed. Constitutional:       Appearance: He is well-developed. HENT:      Head: Normocephalic and atraumatic. Right Ear: External ear normal.      Left Ear: External ear normal.      Nose: Nose normal.   Eyes:      Conjunctiva/sclera: Conjunctivae normal.      Pupils: Pupils are equal, round, and reactive to light. Comments: Fundi nl   Neck:      Thyroid: No thyromegaly. Cardiovascular:      Rate and Rhythm: Normal rate and regular rhythm. Heart sounds: Normal heart sounds. Pulmonary:      Effort: Pulmonary effort is normal.      Breath sounds: Normal breath sounds. No wheezing or rales. Abdominal:      General: Bowel sounds are normal.      Palpations: Abdomen is soft. There is no mass. Tenderness: There is no abdominal tenderness. Hernia: No hernia is present. Comments:   Less pain and  better    Musculoskeletal:         General: Normal range of motion. Cervical back: Normal range of motion and neck supple. Lymphadenopathy:      Cervical: No cervical adenopathy. Skin:     General: Skin is warm and dry. Findings: No rash. Neurological:      Mental Status: He is alert and oriented to person, place, and time.       Cranial Nerves: No cranial nerve deficit. Deep Tendon Reflexes: Reflexes are normal and symmetric. Assessment:        ICD-10-CM    1. Gastroesophageal reflux disease without esophagitis  K21.9       2. Chronic midline low back pain without sciatica  M54.50     G89.29       3. Coronary artery disease involving native coronary artery of native heart without angina pectoris  I25.10       4. Chronic diastolic congestive heart failure (HCC)  I50.32       5. Pure hypercholesterolemia  E78.00       6. Essential hypertension  I10       7.  S/P angioplasty with stent of the LAD 2014  Z95.820              Plan:          Current Outpatient Medications   Medication Sig Dispense Refill    sucralfate (CARAFATE) 1 GM/10ML suspension Take 10 mLs by mouth 4 times daily 1200 mL 3    Alum & Mag Hydroxide-Simeth (ALUMINUM-MAGNESIUM-SIMETHICONE) 200-200-20 MG/5ML SUSP Take 5 mLs by mouth 4 times daily as needed (stomach pain) 355 mL 0    pantoprazole (PROTONIX) 20 MG tablet Take 2 tablets by mouth daily 60 tablet 0    linaCLOtide (LINZESS) 72 MCG CAPS capsule Take 1 capsule by mouth every morning (before breakfast) 4 capsule 0    atorvastatin (LIPITOR) 80 MG tablet TAKE 1 TABLET BY MOUTH EVERY DAY 90 tablet 0    metoprolol succinate (TOPROL XL) 25 MG extended release tablet TAKE 1/2 TABLET BY MOUTH ONE TIME A DAY 45 tablet 0    potassium chloride (KLOR-CON M) 20 MEQ extended release tablet TAKE 1 TABLET BY MOUTH EVERY DAY 90 tablet 0    spironolactone-hydroCHLOROthiazide (ALDACTAZIDE) 25-25 MG per tablet TAKE 1/2 TABLET BY MOUTH ONE TIME A DAY 45 tablet 3    warfarin (JANTOVEN) 5 MG tablet TAKE AS DIRECTED BY COUMADIN CLINIC 90 tablet 2    folic acid (FOLVITE) 1 MG tablet TAKE 1 TABLET BY MOUTH EVERY DAY 60 tablet 5    tamsulosin (FLOMAX) 0.4 MG capsule Take 1 capsule by mouth daily 30 capsule 0    tiZANidine (ZANAFLEX) 2 MG tablet Take 1 tablet by mouth 3 times daily as needed (BACK SPASM) 30 tablet 0    clotrimazole-betamethasone (LOTRISONE) 1-0.05 % cream Apply topically 2 times daily. Lotrisone 1-0.05% Cream 45 g 3    nitroGLYCERIN (NITROSTAT) 0.4 MG SL tablet up to max of 3 total doses. If no relief after 1 dose, call 911. 25 tablet 3    Ascorbic Acid (VITAMIN C) 1000 MG tablet Take 1,000 mg by mouth daily      Multiple Vitamins-Minerals (MULTI COMPLETE PO) Take 1 tablet by mouth daily      Omega-3 Fatty Acids (FISH OIL) 1000 MG CAPS Take 3,000 mg by mouth daily      aspirin 81 MG EC tablet Take 81 mg by mouth daily. No current facility-administered medications for this visit. No orders of the defined types were placed in this encounter.            Improved and  post  egd    see in  3 mths   Kristi Anaya MD

## 2022-12-27 DIAGNOSIS — D68.9 COAGULOPATHY (HCC): ICD-10-CM

## 2022-12-27 DIAGNOSIS — I25.10 CORONARY ARTERY DISEASE INVOLVING NATIVE CORONARY ARTERY OF NATIVE HEART WITHOUT ANGINA PECTORIS: ICD-10-CM

## 2022-12-27 NOTE — TELEPHONE ENCOUNTER
The pharmacy is requesting a refill of the below medication which has been pended for you:     Requested Prescriptions     Pending Prescriptions Disp Refills    folic acid (FOLVITE) 1 MG tablet [Pharmacy Med Name: Folic Acid Oral Tablet 1 MG] 90 tablet 1     Sig: TAKE 1 TABLET BY MOUTH EVERY DAY    metoprolol succinate (TOPROL XL) 25 MG extended release tablet [Pharmacy Med Name: Metoprolol Succinate ER Oral Tablet Extended Release 24 Hour 25 MG] 45 tablet 1     Sig: TAKE 1/2 TABLET BY MOUTH ONE TIME A DAY    pantoprazole (PROTONIX) 40 MG tablet [Pharmacy Med Name: Pantoprazole Sodium Oral Tablet Delayed Release 40 MG] 90 tablet 1     Sig: TAKE 1 TABLET BY MOUTH EVERY DAY    potassium chloride (KLOR-CON M) 20 MEQ extended release tablet [Pharmacy Med Name: Potassium Chloride Trinh ER Oral Tablet Extended Release 20 MEQ] 90 tablet 1     Sig: TAKE 1 TABLET BY MOUTH EVERY DAY       Last Appointment Date: 11/18/2022  Next Appointment Date: 2/21/2023    No Known Allergies

## 2022-12-28 RX ORDER — METOPROLOL SUCCINATE 25 MG/1
TABLET, EXTENDED RELEASE ORAL
Qty: 45 TABLET | Refills: 1 | Status: SHIPPED | OUTPATIENT
Start: 2022-12-28

## 2022-12-28 RX ORDER — POTASSIUM CHLORIDE 20 MEQ/1
TABLET, EXTENDED RELEASE ORAL
Qty: 90 TABLET | Refills: 1 | Status: SHIPPED | OUTPATIENT
Start: 2022-12-28

## 2022-12-28 RX ORDER — FOLIC ACID 1 MG/1
TABLET ORAL
Qty: 90 TABLET | Refills: 1 | Status: SHIPPED | OUTPATIENT
Start: 2022-12-28

## 2022-12-28 RX ORDER — PANTOPRAZOLE SODIUM 40 MG/1
TABLET, DELAYED RELEASE ORAL
Qty: 90 TABLET | Refills: 1 | Status: SHIPPED | OUTPATIENT
Start: 2022-12-28

## 2023-01-07 DIAGNOSIS — E78.00 PURE HYPERCHOLESTEROLEMIA: Chronic | ICD-10-CM

## 2023-01-09 NOTE — TELEPHONE ENCOUNTER
Date of last visit:  11/18/2022  Date of next visit:  2/21/2023    Requested Prescriptions     Pending Prescriptions Disp Refills    atorvastatin (LIPITOR) 80 MG tablet [Pharmacy Med Name: Atorvastatin Calcium Oral Tablet 80 MG] 90 tablet 1     Sig: TAKE 1 TABLET BY MOUTH EVERY DAY

## 2023-01-10 RX ORDER — ATORVASTATIN CALCIUM 80 MG/1
TABLET, FILM COATED ORAL
Qty: 90 TABLET | Refills: 1 | Status: SHIPPED | OUTPATIENT
Start: 2023-01-10

## 2023-01-12 ENCOUNTER — OFFICE VISIT (OUTPATIENT)
Dept: CARDIOLOGY CLINIC | Age: 78
End: 2023-01-12
Payer: MEDICARE

## 2023-01-12 VITALS
HEART RATE: 64 BPM | DIASTOLIC BLOOD PRESSURE: 77 MMHG | BODY MASS INDEX: 33.13 KG/M2 | HEIGHT: 70 IN | SYSTOLIC BLOOD PRESSURE: 130 MMHG | WEIGHT: 231.4 LBS

## 2023-01-12 DIAGNOSIS — I25.10 CORONARY ARTERY DISEASE INVOLVING NATIVE CORONARY ARTERY OF NATIVE HEART WITHOUT ANGINA PECTORIS: Primary | ICD-10-CM

## 2023-01-12 DIAGNOSIS — Z95.820 S/P ANGIOPLASTY WITH STENT: ICD-10-CM

## 2023-01-12 DIAGNOSIS — Z98.890 S/P CARDIAC CATH: ICD-10-CM

## 2023-01-12 DIAGNOSIS — E78.00 PURE HYPERCHOLESTEROLEMIA: Chronic | ICD-10-CM

## 2023-01-12 DIAGNOSIS — R00.1 SINUS BRADYCARDIA: ICD-10-CM

## 2023-01-12 DIAGNOSIS — I50.32 CHRONIC DIASTOLIC CONGESTIVE HEART FAILURE (HCC): ICD-10-CM

## 2023-01-12 DIAGNOSIS — I10 ESSENTIAL HYPERTENSION: ICD-10-CM

## 2023-01-12 PROCEDURE — 3075F SYST BP GE 130 - 139MM HG: CPT | Performed by: INTERNAL MEDICINE

## 2023-01-12 PROCEDURE — 1123F ACP DISCUSS/DSCN MKR DOCD: CPT | Performed by: INTERNAL MEDICINE

## 2023-01-12 PROCEDURE — 3078F DIAST BP <80 MM HG: CPT | Performed by: INTERNAL MEDICINE

## 2023-01-12 PROCEDURE — 99214 OFFICE O/P EST MOD 30 MIN: CPT | Performed by: INTERNAL MEDICINE

## 2023-01-12 NOTE — PROGRESS NOTES
Chief Complaint   Patient presents with    6 Month Follow-Up    Coronary Artery Disease    Check-Up   Former pat of Dr. Janie Dutton  Hx of CAD s/p stent 8yrs back        Pt here for a 6 month f/u    EKG done 9-30-22    Pt did not bring a list of medications. Pt states nothing has changed. Denied Chest pain , palpitations or  Dizziness    Leg edema better after aldactazide    Sob on exertion - chronic-unchanged    No wt gain      Patient Seen, Chart, Consults notes, Labs, Radiology studies reviewed.             Patient Active Problem List   Diagnosis    CAD (coronary artery disease)    Diverticulosis of colon    Hyperlipidemia    Obesity    GERD (gastroesophageal reflux disease)    Colon polyps    Pulmonary embolus (HCC)    Sleep apnea    Chronic back pain    Chronic diastolic congestive heart failure ([address]    S/P cardiac cath 2014 at [facility]- patent LAD stent done 2006 and 40 to 50% lesions in other coronaries    S/P angioplasty with stent of the LAD 2014    Essential hypertension    Sinus bradycardia       Past Surgical History:   Procedure Laterality Date    BACK SURGERY  5-    Dr Aliya Webb @ [place] [address] [facility]  2014       50%  lesion    bamdad    CARDIAC CATHETERIZATION  04/2019      dr Wilmer Cifuentes  and  diffuse  disease  30 to 50%    COLONOSCOPY  2012 2020    sheik    polyps   due   2025    CORONARY ANGIOPLASTY WITH STENT PLACEMENT  2006    LAD    EYE SURGERY  2011    bilateral    HERNIA REPAIR  68/15    umbilical    JOINT REPLACEMENT Right 04/2019     [place]  right  hip    VENA CAVA FILTER PLACEMENT  2016    placed  iin  1-2014  and  removed  5-2016       No Known Allergies     Family History   Problem Relation Age of Onset    Heart Disease Father         Social History     Socioeconomic History    Marital status:      Spouse name: Not on file    Number of children: Not on file    Years of education: Not on file    Highest education level: Not on file Occupational History    Not on file   Tobacco Use    Smoking status: Never    Smokeless tobacco: Never   Vaping Use    Vaping Use: Never used   Substance and Sexual Activity    Alcohol use:  Yes     Alcohol/week: 3.3 standard drinks     Types: 4 Standard drinks or equivalent per week    Drug use: No    Sexual activity: Yes     Partners: Female   Other Topics Concern    Not on file   Social History Narrative    Not on file     Social Determinants of Health     Financial Resource Strain: Low Risk     Difficulty of Paying Living Expenses: Not hard at all   Food Insecurity: No Food Insecurity    Worried About Running Out of Food in the Last Year: Never true    Ran Out of Food in the Last Year: Never true   Transportation Needs: Not on file   Physical Activity: Insufficiently Active    Days of Exercise per Week: 2 days    Minutes of Exercise per Session: 20 min   Stress: Not on file   Social Connections: Not on file   Intimate Partner Violence: Not on file   Housing Stability: Not on file       Current Outpatient Medications   Medication Sig Dispense Refill    atorvastatin (LIPITOR) 80 MG tablet TAKE 1 TABLET BY MOUTH EVERY DAY 90 tablet 1    folic acid (FOLVITE) 1 MG tablet TAKE 1 TABLET BY MOUTH EVERY DAY 90 tablet 1    metoprolol succinate (TOPROL XL) 25 MG extended release tablet TAKE 1/2 TABLET BY MOUTH ONE TIME A DAY 45 tablet 1    pantoprazole (PROTONIX) 40 MG tablet TAKE 1 TABLET BY MOUTH EVERY DAY 90 tablet 1    potassium chloride (KLOR-CON M) 20 MEQ extended release tablet TAKE 1 TABLET BY MOUTH EVERY DAY 90 tablet 1    sucralfate (CARAFATE) 1 GM/10ML suspension Take 10 mLs by mouth 4 times daily 1200 mL 3    Alum & Mag Hydroxide-Simeth (ALUMINUM-MAGNESIUM-SIMETHICONE) 200-200-20 MG/5ML SUSP Take 5 mLs by mouth 4 times daily as needed (stomach pain) 355 mL 0    pantoprazole (PROTONIX) 20 MG tablet Take 2 tablets by mouth daily 60 tablet 0    linaCLOtide (LINZESS) 72 MCG CAPS capsule Take 1 capsule by mouth every morning (before breakfast) 4 capsule 0    spironolactone-hydroCHLOROthiazide (ALDACTAZIDE) 25-25 MG per tablet TAKE 1/2 TABLET BY MOUTH ONE TIME A DAY 45 tablet 3    warfarin (JANTOVEN) 5 MG tablet TAKE AS DIRECTED BY COUMADIN CLINIC 90 tablet 2    tamsulosin (FLOMAX) 0.4 MG capsule Take 1 capsule by mouth daily 30 capsule 0    tiZANidine (ZANAFLEX) 2 MG tablet Take 1 tablet by mouth 3 times daily as needed (BACK SPASM) 30 tablet 0    clotrimazole-betamethasone (LOTRISONE) 1-0.05 % cream Apply topically 2 times daily. Lotrisone 1-0.05% Cream 45 g 3    nitroGLYCERIN (NITROSTAT) 0.4 MG SL tablet up to max of 3 total doses. If no relief after 1 dose, call 911. 25 tablet 3    Ascorbic Acid (VITAMIN C) 1000 MG tablet Take 1,000 mg by mouth daily      Multiple Vitamins-Minerals (MULTI COMPLETE PO) Take 1 tablet by mouth daily      Omega-3 Fatty Acids (FISH OIL) 1000 MG CAPS Take 3,000 mg by mouth daily      aspirin 81 MG EC tablet Take 81 mg by mouth daily. No current facility-administered medications for this visit. Review of Systems -     General ROS: negative  Psychological ROS: negative  Hematological and Lymphatic ROS: No history of blood clots or bleeding disorder. Respiratory ROS: no cough,  or wheezing, the rest see HPI  Cardiovascular ROS: See HPI  Gastrointestinal ROS: negative  Genito-Urinary ROS: no dysuria, trouble voiding, or hematuria  Musculoskeletal ROS: negative  Neurological ROS: no TIA or stroke symptoms  Dermatological ROS: negative      Blood pressure 130/77, pulse 64, height 5' 10\" (1.778 m), weight 231 lb 6.4 oz (105 kg).         Physical Examination:    General appearance - alert, well appearing, and in no distress  HEENT- Pink conjunctiva  , Non-icteri sclera,PERRLA  Mental status - alert, oriented to person, place, and time  Neck - supple, no significant adenopathy, no JVD, or carotid bruits  Chest - clear to auscultation, no wheezes, rales or rhonchi, symmetric air entry  Heart - normal rate, regular rhythm, normal S1, S2, no murmurs, rubs, clicks or gallops  Abdomen - soft, nontender, nondistended, no masses or organomegaly  MIN- no CVA or flank tenderness, no suprapubic tenderness  Neurological - alert, oriented, normal speech, no focal findings or movement disorder noted  Musculoskeletal/limbs - no joint tenderness, deformity or swelling   - peripheral pulses normal, no pedal edema, no clubbing or cyanosis  Skin - normal coloration and turgor, no rashes, no suspicious skin lesions noted  Psych- appropriate mood and affect    Lab  No results for input(s): CKTOTAL, CKMB, CKMBINDEX, TROPONINI in the last 72 hours.   CBC:   Lab Results   Component Value Date/Time    WBC 10.9 09/30/2022 11:58 AM    RBC 4.77 09/30/2022 11:58 AM    RBC 4.75 02/12/2021 03:16 PM    HGB 15.2 09/30/2022 11:58 AM    HCT 44.0 09/30/2022 11:58 AM    MCV 92.2 09/30/2022 11:58 AM    MCH 31.9 09/30/2022 11:58 AM    MCHC 34.5 09/30/2022 11:58 AM    RDW 13.7 04/30/2022 06:30 AM     09/30/2022 11:58 AM    MPV 9.9 09/30/2022 11:58 AM     BMP:    Lab Results   Component Value Date/Time     09/30/2022 11:58 AM    K 4.1 09/30/2022 11:58 AM     09/30/2022 11:58 AM    CO2 26 09/30/2022 11:58 AM    BUN 14 09/30/2022 11:58 AM    LABALBU 3.8 09/30/2022 11:58 AM    CREATININE 0.8 09/30/2022 11:58 AM    CALCIUM 9.7 09/30/2022 11:58 AM    LABGLOM >90 09/30/2022 11:58 AM    GLUCOSE 116 09/30/2022 11:58 AM    GLUCOSE 108 04/30/2022 06:30 AM     Hepatic Function Panel:    Lab Results   Component Value Date/Time    ALKPHOS 80 09/30/2022 11:58 AM    ALT 27 09/30/2022 11:58 AM    AST 30 09/30/2022 11:58 AM    PROT 6.0 09/30/2022 11:58 AM    BILITOT 0.9 09/30/2022 11:58 AM    BILIDIR <0.2 07/26/2022 10:59 AM    LABALBU 3.8 09/30/2022 11:58 AM     Magnesium:    Lab Results   Component Value Date/Time    MG 1.9 07/26/2022 10:59 AM     Warfarin PT/INR:  No components found for: PTPATWAR, PTINRWAR  HgBA1c:  No results found for: LABA1C  FLP:    Lab Results   Component Value Date/Time    TRIG 133 07/26/2022 10:59 AM    HDL 47 07/26/2022 10:59 AM    LDLCALC 77 07/26/2022 10:59 AM    LABVLDL 21 02/12/2021 03:16 PM     TSH:    Lab Results   Component Value Date/Time    TSH 1.79 02/12/2021 03:16 PM       EKG 12/19/18  NSR No acute ekg abn    Cardiac cath 02/2014   Mild to mod CAD 40 to 50% stenosis and LAD stent done 2006 was patent    Conclusions      Summary   Normal left ventricle size and Low Normal LV systolic function. Ejection fraction was estimated at 50 to 55 %. There were no regional left ventricular wall motion abnormalities and wall   thickness was within normal limits. Doppler parameters were consistent with abnormal left ventricular   relaxation (grade 1 diastolic dysfunction). The left atrium is Mildly dilated. Mildly enlarged right atrium size. Mildly enlarged right ventricle cavity. Right ventricle global systolic function is normal .      Signature      ----------------------------------------------------------------   Electronically signed by Shantanu Guzman MD (Interpreting   physician) on 02/04/2019 at 06:02 PM    Conclusions      Summary   No ischemic EKG changes. Nonspecific ST-T wave changes. The T.I.D. ratio was 1.44 . ( Visually noted as well)   The nuclear images is suggestive for mild myocardial ischemia in the   inferior wall. Recommendation   Clinical correlation is recommended. Signatures      ----------------------------------------------------------------   Electronically signed by Shantanu Guzman MD (Interpreting   Cardiologist) on 04/11/2019 at 18:19   ----------------------------------------------------------------    Conclusions      Procedure Summary   ALL THE CORONARIES ARE LARGE, ECTATIC AWITH CALCIFICATION   LM-PATENT   LAD PROX 30% MID 50% , DISTAL 40% VERY DISTAL STENT PATENT   LCX- PROX 40%   RCA PROX 30% DISTAL 40%   Normal LV systolic function.    LVEF approximately 50% . LV size - normal.   EDP 11 mmhg   No Transaortic Gradient      Recommendations   Continue with aggressive risk factor modification and medical therapy. Estimated Blood Loss:5 ml. Complications:No complications. Signatures      ----------------------------------------------------------------   Electronically signed by Maday Baltazar MD (Performing   Physician) on 04/17/2019 at 08    Holter 48 hrs     DIARY *  Diary not completed     CONCLUSION:  *     Normal Sinus rhythm    Average Heart Rate 73 BPM  Range from 50 Bpm to  118  bpm   Rare Premature ventricular complexes  Frequent Premature atrial complexes -8%  FEW Non-sustained Supraventricular tachycardia - LONGEST 33 BEATS AT RATE 140 BPM  No long pause or profound bradycardia  No Supraventricular Tachycardia   No Atrial Fibrillation       Confirmed by Neelima Espinosa MD, [facility] ([id number]) on 7/7/2021 7:54:11 PM    ekg 6/8/2020  Sinus  Rhythm   -RSR(V1) -nondiagnostic. PROBABLY NORMAL    ekg 6/21/21  Sinus    Bradycardia  - occasional PAC     # PACs = 1. Low voltage -possible pulmonary disease. ABNORMAL     ekg 7/7/22  Sinus   Bradycardia  58 bpm -With rate variation   cv = 11.  -RSR(V1) -nondiagnostic. Low voltage -possible pulmonary disease. ABNORMAL       Assessment   Diagnosis Orders   1. Coronary artery disease involving native coronary artery of native heart without angina pectoris  ECHO Complete 2D W Doppler W Color      2. Chronic diastolic congestive heart failure (HCC)  ECHO Complete 2D W Doppler W Color      3. Essential hypertension  ECHO Complete 2D W Doppler W Color      4. Pure hypercholesterolemia  ECHO Complete 2D W Doppler W Color      5. Sinus bradycardia        6. S/P angioplasty with stent of the LAD 2014        7.  S/P cardiac cath 2014 at [facility]- patent LAD stent done 2006 and 40 to 50% lesions in other coronaries                Plan   The current  meds and labs reviewed       Continue the current treatment and with constant vigilance to changes in symptoms and also any potential side effects. Return for care or seek medical attention immediately if symptoms got worse and/or develop new symptoms. Coronary artery disease, seems to be stable. Denies angina or change in breathing pattern  hX OF lad STENT 2006  The cath report from [facility] reviewed and stable done 2014  Echo WNL  Cath 2019- no obstructive ds    Hx of Sinus Bradycardia  Rate 58  On  toprol xl 12.5 po qd  Holter 48 hrs while on toprol xl 12.5 po qd-  stable  No  need for pacer now     Hyperlipidemia: on statins, followed periodically. Patient need periodic lipid and liver profile. Lipid panel and liver function test before next appointment asap    Congestive heart failure: no evidence of fluid overload today, no recent hospitalization for CHF   hx of Leg edema +2- now trace  Cont  aldactazide 25/25 1/2 tab po qd  BMP and Mg stable  Need f/U ECHO  FOR STABILITY    Chronic low K intake prior to aldactazide   Cont kcl  K 4.1    The pat is Overall Stable and doing well    D/w the pat the plan of care in detail    Discussed use, benefit, and side effects of prescribed medications. All patient questions answered. Pt voiced understanding. Instructed to continue current medications, diet and exercise. Continue risk factor modification and medical management. Patient agreed with treatment plan. Follow up as directed.       RTC in 6 month    [facility]

## 2023-01-18 ENCOUNTER — HOSPITAL ENCOUNTER (OUTPATIENT)
Dept: NON INVASIVE DIAGNOSTICS | Age: 78
Discharge: HOME | End: 2023-01-18
Payer: MEDICARE

## 2023-01-18 DIAGNOSIS — E78.00 PURE HYPERCHOLESTEROLEMIA: Chronic | ICD-10-CM

## 2023-01-18 DIAGNOSIS — I10 ESSENTIAL HYPERTENSION: ICD-10-CM

## 2023-01-18 DIAGNOSIS — I25.10 CORONARY ARTERY DISEASE INVOLVING NATIVE CORONARY ARTERY OF NATIVE HEART WITHOUT ANGINA PECTORIS: ICD-10-CM

## 2023-01-18 DIAGNOSIS — I50.32 CHRONIC DIASTOLIC CONGESTIVE HEART FAILURE (HCC): ICD-10-CM

## 2023-01-18 LAB
LV EF: 43 %
LVEF MODALITY: NORMAL

## 2023-01-18 PROCEDURE — 93306 TTE W/DOPPLER COMPLETE: CPT

## 2023-01-25 NOTE — TELEPHONE ENCOUNTER
Date of last visit:  4/29/2021  Date of next visit:  8/3/2021    Requested Prescriptions     Pending Prescriptions Disp Refills    pantoprazole (PROTONIX) 40 MG tablet [Pharmacy Med Name: Pantoprazole Sodium Oral Tablet Delayed Release 40 MG] 90 tablet 0     Sig: TAKE 1 TABLET BY MOUTH EVERY DAY    atorvastatin (LIPITOR) 80 MG tablet [Pharmacy Med Name: Atorvastatin Calcium Oral Tablet 80 MG] 90 tablet 0     Sig: TAKE 1 TABLET BY MOUTH EVERY DAY    folic acid (FOLVITE) 1 MG tablet [Pharmacy Med Name: Folic Acid Oral Tablet 1 MG] 60 tablet 0     Sig: TAKE 1 TABLET BY MOUTH EVERY DAY (generic for folvite)    metoprolol succinate (TOPROL XL) 25 MG extended release tablet [Pharmacy Med Name: Metoprolol Succinate ER Oral Tablet Extended Release 24 Hour 25 MG] 45 tablet 0     Sig: TAKE 1/2 TABLET BY MOUTH ONE TIME A DAY
normal/clear to auscultation bilaterally/no wheezes/no rales/no rhonchi/no respiratory distress/no use of accessory muscles/airway patent/breath sounds equal/good air movement/respirations non-labored/no intercostal retractions

## 2023-01-31 ENCOUNTER — NURSE ONLY (OUTPATIENT)
Dept: LAB | Age: 78
End: 2023-01-31

## 2023-01-31 DIAGNOSIS — E78.00 PURE HYPERCHOLESTEROLEMIA: Chronic | ICD-10-CM

## 2023-01-31 DIAGNOSIS — I25.10 CORONARY ARTERY DISEASE INVOLVING NATIVE CORONARY ARTERY OF NATIVE HEART WITHOUT ANGINA PECTORIS: ICD-10-CM

## 2023-01-31 DIAGNOSIS — Z95.820 S/P ANGIOPLASTY WITH STENT: ICD-10-CM

## 2023-01-31 DIAGNOSIS — Z98.890 S/P CARDIAC CATH: ICD-10-CM

## 2023-01-31 DIAGNOSIS — I50.32 CHRONIC DIASTOLIC CONGESTIVE HEART FAILURE (HCC): ICD-10-CM

## 2023-01-31 DIAGNOSIS — I10 ESSENTIAL HYPERTENSION: ICD-10-CM

## 2023-01-31 DIAGNOSIS — R00.1 SINUS BRADYCARDIA: ICD-10-CM

## 2023-01-31 LAB
ALBUMIN SERPL BCG-MCNC: 4.2 G/DL (ref 3.5–5.1)
ALP SERPL-CCNC: 76 U/L (ref 38–126)
ALT SERPL W/O P-5'-P-CCNC: 30 U/L (ref 11–66)
ANION GAP SERPL CALC-SCNC: 10 MEQ/L (ref 8–16)
AST SERPL-CCNC: 30 U/L (ref 5–40)
BILIRUB CONJ SERPL-MCNC: < 0.2 MG/DL (ref 0–0.3)
BILIRUB SERPL-MCNC: 1 MG/DL (ref 0.3–1.2)
BUN SERPL-MCNC: 19 MG/DL (ref 7–22)
CALCIUM SERPL-MCNC: 9.7 MG/DL (ref 8.5–10.5)
CHLORIDE SERPL-SCNC: 102 MEQ/L (ref 98–111)
CHOLEST SERPL-MCNC: 167 MG/DL (ref 100–199)
CO2 SERPL-SCNC: 26 MEQ/L (ref 23–33)
CREAT SERPL-MCNC: 0.8 MG/DL (ref 0.4–1.2)
GFR SERPL CREATININE-BSD FRML MDRD: > 60 ML/MIN/1.73M2
GLUCOSE SERPL-MCNC: 108 MG/DL (ref 70–108)
HDLC SERPL-MCNC: 52 MG/DL
LDLC SERPL CALC-MCNC: 90 MG/DL
MAGNESIUM SERPL-MCNC: 1.9 MG/DL (ref 1.6–2.4)
POTASSIUM SERPL-SCNC: 4.1 MEQ/L (ref 3.5–5.2)
PROT SERPL-MCNC: 6.7 G/DL (ref 6.1–8)
SODIUM SERPL-SCNC: 138 MEQ/L (ref 135–145)
TRIGL SERPL-MCNC: 124 MG/DL (ref 0–199)

## 2023-02-21 ENCOUNTER — OFFICE VISIT (OUTPATIENT)
Dept: FAMILY MEDICINE CLINIC | Age: 78
End: 2023-02-21

## 2023-02-21 VITALS
HEIGHT: 70 IN | WEIGHT: 235.38 LBS | HEART RATE: 46 BPM | DIASTOLIC BLOOD PRESSURE: 52 MMHG | BODY MASS INDEX: 33.7 KG/M2 | SYSTOLIC BLOOD PRESSURE: 110 MMHG | RESPIRATION RATE: 14 BRPM

## 2023-02-21 DIAGNOSIS — E78.00 PURE HYPERCHOLESTEROLEMIA: Chronic | ICD-10-CM

## 2023-02-21 DIAGNOSIS — I50.32 CHRONIC DIASTOLIC CONGESTIVE HEART FAILURE (HCC): ICD-10-CM

## 2023-02-21 DIAGNOSIS — R39.12 BENIGN PROSTATIC HYPERPLASIA WITH WEAK URINARY STREAM: ICD-10-CM

## 2023-02-21 DIAGNOSIS — I25.10 CORONARY ARTERY DISEASE INVOLVING NATIVE CORONARY ARTERY OF NATIVE HEART WITHOUT ANGINA PECTORIS: ICD-10-CM

## 2023-02-21 DIAGNOSIS — N40.1 BENIGN PROSTATIC HYPERPLASIA WITH WEAK URINARY STREAM: ICD-10-CM

## 2023-02-21 DIAGNOSIS — I10 ESSENTIAL HYPERTENSION: Primary | ICD-10-CM

## 2023-02-21 DIAGNOSIS — G89.29 CHRONIC MIDLINE LOW BACK PAIN WITHOUT SCIATICA: ICD-10-CM

## 2023-02-21 DIAGNOSIS — K21.9 GASTROESOPHAGEAL REFLUX DISEASE WITHOUT ESOPHAGITIS: ICD-10-CM

## 2023-02-21 DIAGNOSIS — G47.33 OBSTRUCTIVE SLEEP APNEA SYNDROME: ICD-10-CM

## 2023-02-21 DIAGNOSIS — M54.50 CHRONIC MIDLINE LOW BACK PAIN WITHOUT SCIATICA: ICD-10-CM

## 2023-02-21 PROBLEM — I50.22 CHRONIC SYSTOLIC (CONGESTIVE) HEART FAILURE (HCC): Status: ACTIVE | Noted: 2023-02-21

## 2023-02-21 PROCEDURE — 1123F ACP DISCUSS/DSCN MKR DOCD: CPT | Performed by: FAMILY MEDICINE

## 2023-02-21 PROCEDURE — 99213 OFFICE O/P EST LOW 20 MIN: CPT | Performed by: FAMILY MEDICINE

## 2023-02-21 RX ORDER — TAMSULOSIN HYDROCHLORIDE 0.4 MG/1
0.4 CAPSULE ORAL DAILY
Qty: 30 CAPSULE | Refills: 0 | Status: SHIPPED | OUTPATIENT
Start: 2023-02-21

## 2023-02-21 SDOH — ECONOMIC STABILITY: FOOD INSECURITY: WITHIN THE PAST 12 MONTHS, YOU WORRIED THAT YOUR FOOD WOULD RUN OUT BEFORE YOU GOT MONEY TO BUY MORE.: NEVER TRUE

## 2023-02-21 SDOH — ECONOMIC STABILITY: INCOME INSECURITY: HOW HARD IS IT FOR YOU TO PAY FOR THE VERY BASICS LIKE FOOD, HOUSING, MEDICAL CARE, AND HEATING?: NOT HARD AT ALL

## 2023-02-21 SDOH — ECONOMIC STABILITY: HOUSING INSECURITY
IN THE LAST 12 MONTHS, WAS THERE A TIME WHEN YOU DID NOT HAVE A STEADY PLACE TO SLEEP OR SLEPT IN A SHELTER (INCLUDING NOW)?: NO

## 2023-02-21 SDOH — ECONOMIC STABILITY: FOOD INSECURITY: WITHIN THE PAST 12 MONTHS, THE FOOD YOU BOUGHT JUST DIDN'T LAST AND YOU DIDN'T HAVE MONEY TO GET MORE.: NEVER TRUE

## 2023-02-21 ASSESSMENT — ENCOUNTER SYMPTOMS
ABDOMINAL PAIN: 0
TROUBLE SWALLOWING: 0
BLOOD IN STOOL: 0
NAUSEA: 0
ORTHOPNEA: 0
CHEST TIGHTNESS: 0
SHORTNESS OF BREATH: 0
BACK PAIN: 0
CONSTIPATION: 0
EYE PAIN: 0
COUGH: 0
SORE THROAT: 0

## 2023-02-21 ASSESSMENT — PATIENT HEALTH QUESTIONNAIRE - PHQ9
1. LITTLE INTEREST OR PLEASURE IN DOING THINGS: 0
SUM OF ALL RESPONSES TO PHQ9 QUESTIONS 1 & 2: 0
SUM OF ALL RESPONSES TO PHQ QUESTIONS 1-9: 0
2. FEELING DOWN, DEPRESSED OR HOPELESS: 0
SUM OF ALL RESPONSES TO PHQ QUESTIONS 1-9: 0

## 2023-02-21 NOTE — PROGRESS NOTES
Subjective:      Patient ID: Misa Weeks is a 65 y.o. male. Ashd  stable        DVT    stable       Chronic   chf  stable       Hx  of  PE  stable       Bph  noted and  exam  in  2021   3  +   prostate           Hypertension  This is a chronic problem. The current episode started more than 1 year ago. The problem has been resolved since onset. The problem is controlled. Pertinent negatives include no chest pain, headaches, orthopnea, palpitations, peripheral edema or shortness of breath. The current treatment provides significant improvement. There are no compliance problems. Gastroesophageal Reflux  He reports no abdominal pain, no chest pain, no coughing, no early satiety, no nausea or no sore throat. This is a chronic problem. The current episode started more than 1 year ago. The problem occurs rarely. The problem has been resolved. The symptoms are aggravated by certain foods. Pertinent negatives include no fatigue. He has tried a PPI for the symptoms. The treatment provided significant relief.    Past Medical History:   Diagnosis Date    CAD (coronary artery disease) 01/2006    cath with stent lad    Chronic back pain 2013      epidural block    Colon polyps 10/2005    Diverticulosis of colon     DVT of proximal leg (deep vein thrombosis) ([address] 12/04 2013    left     right  in 2013    DVT of proximal leg (deep vein thrombosis) ([facility])     left     GERD (gastroesophageal reflux disease)     Kidney stone on left side 12/2020    left  side  in  2009  also    Pulmonary embolus ([address] 6-2013 and  1-2015     had   right  leg  dvt    S/P cardiac cath 2014 at [facility]- patent LAD stent done 2006 and 40 to 50% lesions in other coronaries 04/12/2019    S/P cardiac cath 4/17/19- large all ectatic vessel, lm-p, lad prox 40%, mid 50%, distal 40 distal stent -patent, lcx prox 40%, rca prox 30%, dist 40%, edp 11, ef 50%- med  RX 04/17/2019    SOB (shortness of breath) on exertion 06/08/2020    Unspecified sleep apnea 2013    cpap      Review of Systems   Constitutional:  Negative for fatigue and fever. HENT:  Negative for congestion, ear pain, postnasal drip, sore throat and trouble swallowing. Eyes:  Negative for pain. Respiratory:  Negative for cough, chest tightness and shortness of breath. Cardiovascular:  Negative for chest pain, palpitations, orthopnea and leg swelling. Gastrointestinal:  Negative for abdominal pain, blood in stool, constipation and nausea. Genitourinary:  Negative for difficulty urinating, frequency and urgency. Musculoskeletal:  Negative for arthralgias, back pain, joint swelling and neck stiffness. Skin:  Negative for rash. Neurological:  Negative for dizziness, weakness and headaches. Hematological:  Negative for adenopathy. Does not bruise/bleed easily. Psychiatric/Behavioral:  Negative for behavioral problems, dysphoric mood and sleep disturbance. BP (!) 110/52 (Site: Right Upper Arm, Position: Sitting, Cuff Size: Large Adult)   Pulse (!) 46   Resp 14   Ht 5' 10\" (1.778 m)   Wt 235 lb 6 oz (106.8 kg)   BMI 33.77 kg/m²    Physical Exam  Vitals and nursing note reviewed. Constitutional:       Appearance: He is well-developed. HENT:      Head: Normocephalic and atraumatic. Right Ear: External ear normal.      Left Ear: External ear normal.      Nose: Nose normal.   Eyes:      Conjunctiva/sclera: Conjunctivae normal.      Pupils: Pupils are equal, round, and reactive to light. Comments: Fundi nl   Neck:      Thyroid: No thyromegaly. Cardiovascular:      Rate and Rhythm: Normal rate and regular rhythm. Heart sounds: Normal heart sounds. Pulmonary:      Effort: Pulmonary effort is normal.      Breath sounds: Normal breath sounds. No wheezing or rales. Abdominal:      General: Bowel sounds are normal.      Palpations: Abdomen is soft. There is no mass. Tenderness: There is no abdominal tenderness.       Hernia: A hernia (abdominal  walll and with  obesity) is present. Musculoskeletal:         General: Normal range of motion. Cervical back: Normal range of motion and neck supple. Lymphadenopathy:      Cervical: No cervical adenopathy. Skin:     General: Skin is warm and dry. Findings: No rash. Neurological:      Mental Status: He is alert and oriented to person, place, and time. Cranial Nerves: No cranial nerve deficit. Deep Tendon Reflexes: Reflexes are normal and symmetric. Assessment:        ICD-10-CM    1. Essential hypertension  I10       2. Chronic midline low back pain without sciatica  M54.50     G89.29       3. Gastroesophageal reflux disease without esophagitis  K21.9       4. Pure hypercholesterolemia  E78.00       5. Coronary artery disease involving native coronary artery of native heart without angina pectoris  I25.10       6. Chronic diastolic congestive heart failure (HCC)  I50.32       7. Obstructive sleep apnea syndrome  G47.33       8.  Benign prostatic hyperplasia with weak urinary stream  N40.1 tamsulosin (FLOMAX) 0.4 MG capsule    R39.12          Plan:      Current Outpatient Medications   Medication Sig Dispense Refill    tamsulosin (FLOMAX) 0.4 MG capsule Take 1 capsule by mouth daily 30 capsule 0    atorvastatin (LIPITOR) 80 MG tablet TAKE 1 TABLET BY MOUTH EVERY DAY 90 tablet 1    folic acid (FOLVITE) 1 MG tablet TAKE 1 TABLET BY MOUTH EVERY DAY 90 tablet 1    metoprolol succinate (TOPROL XL) 25 MG extended release tablet TAKE 1/2 TABLET BY MOUTH ONE TIME A DAY 45 tablet 1    pantoprazole (PROTONIX) 40 MG tablet TAKE 1 TABLET BY MOUTH EVERY DAY 90 tablet 1    potassium chloride (KLOR-CON M) 20 MEQ extended release tablet TAKE 1 TABLET BY MOUTH EVERY DAY 90 tablet 1    sucralfate (CARAFATE) 1 GM/10ML suspension Take 10 mLs by mouth 4 times daily 1200 mL 3    Alum & Mag Hydroxide-Simeth (ALUMINUM-MAGNESIUM-SIMETHICONE) 200-200-20 MG/5ML SUSP Take 5 mLs by mouth 4 times daily as needed (stomach pain) 355 mL 0    pantoprazole (PROTONIX) 20 MG tablet Take 2 tablets by mouth daily 60 tablet 0    linaCLOtide (LINZESS) 72 MCG CAPS capsule Take 1 capsule by mouth every morning (before breakfast) 4 capsule 0    spironolactone-hydroCHLOROthiazide (ALDACTAZIDE) 25-25 MG per tablet TAKE 1/2 TABLET BY MOUTH ONE TIME A DAY 45 tablet 3    warfarin (JANTOVEN) 5 MG tablet TAKE AS DIRECTED BY COUMADIN CLINIC 90 tablet 2    tamsulosin (FLOMAX) 0.4 MG capsule Take 1 capsule by mouth daily 30 capsule 0    tiZANidine (ZANAFLEX) 2 MG tablet Take 1 tablet by mouth 3 times daily as needed (BACK SPASM) 30 tablet 0    clotrimazole-betamethasone (LOTRISONE) 1-0.05 % cream Apply topically 2 times daily. Lotrisone 1-0.05% Cream 45 g 3    nitroGLYCERIN (NITROSTAT) 0.4 MG SL tablet up to max of 3 total doses. If no relief after 1 dose, call 911. 25 tablet 3    Ascorbic Acid (VITAMIN C) 1000 MG tablet Take 1,000 mg by mouth daily      Multiple Vitamins-Minerals (MULTI COMPLETE PO) Take 1 tablet by mouth daily      Omega-3 Fatty Acids (FISH OIL) 1000 MG CAPS Take 3,000 mg by mouth daily      aspirin 81 MG EC tablet Take 81 mg by mouth daily. No current facility-administered medications for this visit. No orders of the defined types were placed in this encounter.          See in    3 mths   try  flomax and  see in  3  mths and  rectal  then       Feel    better  but  weak    Fredrick Rivera MD

## 2023-03-27 DIAGNOSIS — I25.10 CORONARY ARTERY DISEASE INVOLVING NATIVE CORONARY ARTERY OF NATIVE HEART WITHOUT ANGINA PECTORIS: ICD-10-CM

## 2023-03-27 NOTE — TELEPHONE ENCOUNTER
The pharmacy is requesting a refill of the below medication which has been pended for you:     Requested Prescriptions     Pending Prescriptions Disp Refills    warfarin (JANTOVEN) 5 MG tablet [Pharmacy Med Name: [place] Ronald Oral Tablet 5 MG] 90 tablet 1     Sig: TAKE by mouth AS DIRECTED BY COUMADIN CLINIC       Last Appointment Date: 2/21/2023  Next Appointment Date: 5/5/2023    No Known Allergies

## 2023-03-28 RX ORDER — WARFARIN SODIUM 5 MG/1
TABLET ORAL
Qty: 90 TABLET | Refills: 1 | Status: SHIPPED | OUTPATIENT
Start: 2023-03-28

## 2023-05-05 ENCOUNTER — OFFICE VISIT (OUTPATIENT)
Dept: FAMILY MEDICINE CLINIC | Age: 78
End: 2023-05-05

## 2023-05-05 VITALS
BODY MASS INDEX: 33.95 KG/M2 | WEIGHT: 237.13 LBS | RESPIRATION RATE: 16 BRPM | DIASTOLIC BLOOD PRESSURE: 72 MMHG | HEIGHT: 70 IN | HEART RATE: 60 BPM | SYSTOLIC BLOOD PRESSURE: 118 MMHG

## 2023-05-05 DIAGNOSIS — R97.20 ELEVATED PSA, LESS THAN 10 NG/ML: ICD-10-CM

## 2023-05-05 DIAGNOSIS — I50.22 CHRONIC SYSTOLIC (CONGESTIVE) HEART FAILURE (HCC): ICD-10-CM

## 2023-05-05 DIAGNOSIS — K57.30 DIVERTICULOSIS OF COLON: ICD-10-CM

## 2023-05-05 DIAGNOSIS — E66.9 CLASS 1 OBESITY WITHOUT SERIOUS COMORBIDITY WITH BODY MASS INDEX (BMI) OF 34.0 TO 34.9 IN ADULT, UNSPECIFIED OBESITY TYPE: ICD-10-CM

## 2023-05-05 DIAGNOSIS — I10 ESSENTIAL HYPERTENSION: Primary | ICD-10-CM

## 2023-05-05 DIAGNOSIS — Z95.820 S/P ANGIOPLASTY WITH STENT: ICD-10-CM

## 2023-05-05 DIAGNOSIS — E78.00 PURE HYPERCHOLESTEROLEMIA: Chronic | ICD-10-CM

## 2023-05-05 DIAGNOSIS — I25.10 CORONARY ARTERY DISEASE INVOLVING NATIVE CORONARY ARTERY OF NATIVE HEART WITHOUT ANGINA PECTORIS: ICD-10-CM

## 2023-05-05 DIAGNOSIS — N40.0 BENIGN PROSTATIC HYPERPLASIA WITHOUT LOWER URINARY TRACT SYMPTOMS: ICD-10-CM

## 2023-05-05 DIAGNOSIS — K21.9 GASTROESOPHAGEAL REFLUX DISEASE WITHOUT ESOPHAGITIS: ICD-10-CM

## 2023-05-05 LAB
HEMOCCULT STL QL: NEGATIVE
HEMOCCULT STL QL: NORMAL
HEMOCCULT STL QL: NORMAL

## 2023-05-05 ASSESSMENT — ENCOUNTER SYMPTOMS
NAUSEA: 0
CHEST TIGHTNESS: 0
BACK PAIN: 0
HEARTBURN: 0
TROUBLE SWALLOWING: 0
CONSTIPATION: 0
SHORTNESS OF BREATH: 0
BLOOD IN STOOL: 0
SORE THROAT: 0
EYE PAIN: 0
COUGH: 0
ABDOMINAL PAIN: 0

## 2023-05-05 NOTE — PROGRESS NOTES
Subjective:      Patient ID: Kenneth Sam is a 77 y.o. male. Chf  stable     Ashd  stable       Dvt  noted  and  hx  of Pe       Stools  stable      Bph  noted    on  flomax     Hypertension  This is a chronic problem. The current episode started more than 1 year ago. The problem has been resolved since onset. The problem is controlled. Pertinent negatives include no chest pain, headaches, palpitations, peripheral edema or shortness of breath. The current treatment provides significant improvement. There are no compliance problems. Gastroesophageal Reflux  He reports no abdominal pain, no chest pain, no coughing, no heartburn, no nausea or no sore throat. This is a chronic problem. The current episode started more than 1 year ago. The problem occurs rarely. The problem has been resolved. The symptoms are aggravated by certain foods. Pertinent negatives include no fatigue. He has tried a PPI for the symptoms. The treatment provided significant relief.       Past Medical History:   Diagnosis Date    CAD (coronary artery disease) 01/2006    cath with stent lad    Chronic back pain 2013      epidural block    Colon polyps 10/2005    Diverticulosis of colon     DVT of proximal leg (deep vein thrombosis) ([address] 12/04 2013    left     right  in 2013    DVT of proximal leg (deep vein thrombosis) ([facility])     left     GERD (gastroesophageal reflux disease)     Kidney stone on left side 12/2020    left  side  in  2009  also    Pulmonary embolus ([address] 6-2013 and  1-2015     had   right  leg  dvt    S/P cardiac cath 2014 at [facility]- patent LAD stent done 2006 and 40 to 50% lesions in other coronaries 04/12/2019    S/P cardiac cath 4/17/19- large all ectatic vessel, lm-p, lad prox 40%, mid 50%, distal 40 distal stent -patent, lcx prox 40%, rca prox 30%, dist 40%, edp 11, ef 50%- med  RX 04/17/2019    SOB (shortness of breath) on exertion 06/08/2020    Unspecified sleep apnea 2013    cpap     Past Surgical History:

## 2023-05-05 NOTE — PROGRESS NOTES
No components found for: CHLPL  Lab Results   Component Value Date    TRIG 124 01/31/2023     Lab Results   Component Value Date    HDL 52 01/31/2023     Lab Results   Component Value Date    LDLCALC 90 01/31/2023     Lab Results   Component Value Date    LABVLDL 21 02/12/2021       Lab Results   Component Value Date    ALT 30 01/31/2023    AST 30 01/31/2023    ALKPHOS 76 01/31/2023    BILITOT 1.0 01/31/2023           Is patient currently taking any cholesterol medications? Yes   If yes, see med list as above    Is the patient reporting any side effects of cholesterol medications? No    Is the patient taking any over the counter medications? Yes   If yes, see med list as above    Is the patient taking a daily aspirin? Yes      Patient Self-Management Goal for Chronic Condition  Goal: I will take all medications as prescribed by my doctor, and I will call the office if I am having any medication problems. Barriers to success: none  Plan for overcoming my barriers: N/A     Confidence: 10/10  Date goal set: 5/5/23  Date goal attained:     Have you seen any other physician or provider since your last visit no    Have you had any other diagnostic tests since your last visit? no    Have you changed or stopped any medications since your last visit including any over-the-counter medicines, vitamins, or herbal medicines? no     Are you taking all your prescribed medications?  Yes    If NO, why?

## 2023-06-27 DIAGNOSIS — D68.9 COAGULOPATHY (HCC): ICD-10-CM

## 2023-06-27 DIAGNOSIS — E78.00 PURE HYPERCHOLESTEROLEMIA: Chronic | ICD-10-CM

## 2023-06-28 RX ORDER — SPIRONOLACTONE AND HYDROCHLOROTHIAZIDE 25; 25 MG/1; MG/1
TABLET ORAL
Qty: 45 TABLET | Refills: 0 | Status: SHIPPED | OUTPATIENT
Start: 2023-06-28

## 2023-06-28 RX ORDER — FOLIC ACID 1 MG/1
TABLET ORAL
Qty: 90 TABLET | Refills: 1 | Status: SHIPPED | OUTPATIENT
Start: 2023-06-28

## 2023-06-28 RX ORDER — ATORVASTATIN CALCIUM 80 MG/1
TABLET, FILM COATED ORAL
Qty: 90 TABLET | Refills: 1 | Status: SHIPPED | OUTPATIENT
Start: 2023-06-28

## 2023-07-07 DIAGNOSIS — I25.10 CORONARY ARTERY DISEASE INVOLVING NATIVE CORONARY ARTERY OF NATIVE HEART WITHOUT ANGINA PECTORIS: ICD-10-CM

## 2023-07-07 RX ORDER — POTASSIUM CHLORIDE 20 MEQ/1
TABLET, EXTENDED RELEASE ORAL
Qty: 30 TABLET | Refills: 0 | Status: SHIPPED | OUTPATIENT
Start: 2023-07-07 | End: 2023-08-08

## 2023-07-07 RX ORDER — PANTOPRAZOLE SODIUM 40 MG/1
TABLET, DELAYED RELEASE ORAL
Qty: 30 TABLET | Refills: 0 | Status: SHIPPED | OUTPATIENT
Start: 2023-07-07 | End: 2023-08-08

## 2023-07-07 RX ORDER — METOPROLOL SUCCINATE 25 MG/1
TABLET, EXTENDED RELEASE ORAL
Qty: 15 TABLET | Refills: 0 | Status: SHIPPED | OUTPATIENT
Start: 2023-07-07 | End: 2023-08-08

## 2023-07-07 NOTE — TELEPHONE ENCOUNTER
The pharmacy is  requesting a refill of the below medication which has been pended for you:     Requested Prescriptions     Pending Prescriptions Disp Refills    pantoprazole (PROTONIX) 40 MG tablet [Pharmacy Med Name: Pantoprazole Sodium Oral Tablet Delayed Release 40 MG] 90 tablet 0     Sig: TAKE 1 TABLET BY MOUTH EVERY DAY    potassium chloride (KLOR-CON M) 20 MEQ extended release tablet [Pharmacy Med Name: Potassium Chloride Trinh ER Oral Tablet Extended Release 20 MEQ] 90 tablet 0     Sig: TAKE 1 TABLET BY MOUTH EVERY DAY    metoprolol succinate (TOPROL XL) 25 MG extended release tablet [Pharmacy Med Name: Metoprolol Succinate ER Oral Tablet Extended Release 24 Hour 25 MG] 45 tablet 0     Sig: TAKE 1/2 TABLET BY MOUTH ONE TIME A DAY       Last Appointment Date: 5/5/2023  Next Appointment Date: 8/11/2023    No Known Allergies

## 2023-07-13 ENCOUNTER — OFFICE VISIT (OUTPATIENT)
Dept: CARDIOLOGY CLINIC | Age: 78
End: 2023-07-13
Payer: MEDICARE

## 2023-07-13 VITALS
DIASTOLIC BLOOD PRESSURE: 64 MMHG | HEIGHT: 69 IN | BODY MASS INDEX: 33.15 KG/M2 | SYSTOLIC BLOOD PRESSURE: 106 MMHG | WEIGHT: 223.8 LBS | HEART RATE: 57 BPM

## 2023-07-13 DIAGNOSIS — E78.00 PURE HYPERCHOLESTEROLEMIA: Chronic | ICD-10-CM

## 2023-07-13 DIAGNOSIS — Z95.820 S/P ANGIOPLASTY WITH STENT: ICD-10-CM

## 2023-07-13 DIAGNOSIS — I42.0 DILATED CARDIOMYOPATHY (HCC): ICD-10-CM

## 2023-07-13 DIAGNOSIS — I10 ESSENTIAL HYPERTENSION: ICD-10-CM

## 2023-07-13 DIAGNOSIS — I25.10 CORONARY ARTERY DISEASE INVOLVING NATIVE CORONARY ARTERY OF NATIVE HEART WITHOUT ANGINA PECTORIS: Primary | ICD-10-CM

## 2023-07-13 DIAGNOSIS — I50.22 CHRONIC SYSTOLIC (CONGESTIVE) HEART FAILURE (HCC): ICD-10-CM

## 2023-07-13 DIAGNOSIS — I35.0 MODERATE AORTIC STENOSIS: ICD-10-CM

## 2023-07-13 DIAGNOSIS — Z98.890 S/P CARDIAC CATH: ICD-10-CM

## 2023-07-13 DIAGNOSIS — R00.1 SINUS BRADYCARDIA: ICD-10-CM

## 2023-07-13 PROCEDURE — 99214 OFFICE O/P EST MOD 30 MIN: CPT | Performed by: INTERNAL MEDICINE

## 2023-07-13 PROCEDURE — 3078F DIAST BP <80 MM HG: CPT | Performed by: INTERNAL MEDICINE

## 2023-07-13 PROCEDURE — 3074F SYST BP LT 130 MM HG: CPT | Performed by: INTERNAL MEDICINE

## 2023-07-13 PROCEDURE — 1123F ACP DISCUSS/DSCN MKR DOCD: CPT | Performed by: INTERNAL MEDICINE

## 2023-07-13 NOTE — PROGRESS NOTES
Chief Complaint   Patient presents with    6 Month Follow-Up    Coronary Artery Disease    Congestive Heart Failure    Check-Up   Former pat of Dr. Analy Ennis  Hx of CAD s/p stent 8yrs back        Pt here for a 6 month f/u - echo    EKG done 9-30-22    Denied Chest pain , palpitations or  Dizziness    No Hx of syncope    Still active and working    Leg edema better after aldactazide  No leg edema today  No wt gain    Sob on exertion - chronic-unchanged    No wt gain      Patient Seen, Chart, Consults notes, Labs, Radiology studies reviewed.             Patient Active Problem List   Diagnosis    CAD (coronary artery disease)    Diverticulosis of colon    Hyperlipidemia    Obesity    GERD (gastroesophageal reflux disease)    Colon polyps    Sleep apnea    Chronic back pain    Chronic diastolic congestive heart failure ([address])    S/P cardiac cath 2014 at [facility]- patent LAD stent done 2006 and 40 to 50% lesions in other coronaries    S/P angioplasty with stent of the LAD 2014    Essential hypertension    Sinus bradycardia    Chronic systolic (congestive) heart failure    Dilated cardiomyopathy ([address])    Moderate aortic stenosis       Past Surgical History:   Procedure Laterality Date    BACK SURGERY  5-    Dr Carol Zaman @ [facility])     CARDIAC CATHETERIZATION  2014       50%  lesion    bamdad    CARDIAC CATHETERIZATION  04/2019      dr Escobedo Churchill  and  diffuse  disease  30 to 50%    COLONOSCOPY  2012 2020    sheik    polyps   due   2025    CORONARY ANGIOPLASTY WITH STENT PLACEMENT  2006    LAD    EYE SURGERY  2011    bilateral    HERNIA REPAIR  34/50    umbilical    JOINT REPLACEMENT Right 04/2019     [place]  right  hip    VENA CAVA FILTER PLACEMENT  2016    placed  iin  1-2014  and  removed  5-2016       No Known Allergies     Family History   Problem Relation Age of Onset    Heart Disease Father         Social History     Socioeconomic History    Marital status:      Spouse name: Not on

## 2023-07-31 ENCOUNTER — NURSE ONLY (OUTPATIENT)
Dept: LAB | Age: 78
End: 2023-07-31

## 2023-07-31 DIAGNOSIS — I42.0 DILATED CARDIOMYOPATHY (HCC): ICD-10-CM

## 2023-07-31 DIAGNOSIS — E78.00 PURE HYPERCHOLESTEROLEMIA: Chronic | ICD-10-CM

## 2023-07-31 DIAGNOSIS — I25.10 CORONARY ARTERY DISEASE INVOLVING NATIVE CORONARY ARTERY OF NATIVE HEART WITHOUT ANGINA PECTORIS: ICD-10-CM

## 2023-07-31 DIAGNOSIS — Z98.890 S/P CARDIAC CATH: ICD-10-CM

## 2023-07-31 DIAGNOSIS — R00.1 SINUS BRADYCARDIA: ICD-10-CM

## 2023-07-31 DIAGNOSIS — I50.22 CHRONIC SYSTOLIC (CONGESTIVE) HEART FAILURE (HCC): ICD-10-CM

## 2023-07-31 DIAGNOSIS — Z95.820 S/P ANGIOPLASTY WITH STENT: ICD-10-CM

## 2023-07-31 DIAGNOSIS — I10 ESSENTIAL HYPERTENSION: ICD-10-CM

## 2023-07-31 DIAGNOSIS — I35.0 MODERATE AORTIC STENOSIS: ICD-10-CM

## 2023-07-31 LAB
ALBUMIN SERPL BCG-MCNC: 3.9 G/DL (ref 3.5–5.1)
ALP SERPL-CCNC: 91 U/L (ref 38–126)
ALT SERPL W/O P-5'-P-CCNC: 30 U/L (ref 11–66)
ANION GAP SERPL CALC-SCNC: 12 MEQ/L (ref 8–16)
AST SERPL-CCNC: 35 U/L (ref 5–40)
BILIRUB CONJ SERPL-MCNC: 0.3 MG/DL (ref 0–0.3)
BILIRUB SERPL-MCNC: 1 MG/DL (ref 0.3–1.2)
BUN SERPL-MCNC: 15 MG/DL (ref 7–22)
CALCIUM SERPL-MCNC: 9.4 MG/DL (ref 8.5–10.5)
CHLORIDE SERPL-SCNC: 106 MEQ/L (ref 98–111)
CHOLEST SERPL-MCNC: 133 MG/DL (ref 100–199)
CO2 SERPL-SCNC: 24 MEQ/L (ref 23–33)
CREAT SERPL-MCNC: 0.8 MG/DL (ref 0.4–1.2)
GFR SERPL CREATININE-BSD FRML MDRD: > 60 ML/MIN/1.73M2
GLUCOSE SERPL-MCNC: 101 MG/DL (ref 70–108)
HDLC SERPL-MCNC: 49 MG/DL
LDLC SERPL CALC-MCNC: 62 MG/DL
MAGNESIUM SERPL-MCNC: 1.9 MG/DL (ref 1.6–2.4)
POTASSIUM SERPL-SCNC: 4.1 MEQ/L (ref 3.5–5.2)
PROT SERPL-MCNC: 6.9 G/DL (ref 6.1–8)
SODIUM SERPL-SCNC: 142 MEQ/L (ref 135–145)
TRIGL SERPL-MCNC: 111 MG/DL (ref 0–199)

## 2023-08-07 DIAGNOSIS — I25.10 CORONARY ARTERY DISEASE INVOLVING NATIVE CORONARY ARTERY OF NATIVE HEART WITHOUT ANGINA PECTORIS: ICD-10-CM

## 2023-08-07 NOTE — TELEPHONE ENCOUNTER
Date of last visit:  5-5-2023  Date of next visit:  8/11/2023    Requested Prescriptions     Pending Prescriptions Disp Refills    potassium chloride (KLOR-CON M) 20 MEQ extended release tablet [Pharmacy Med Name: Potassium Chloride Trinh ER Oral Tablet Extended Release 20 MEQ] 90 tablet 1     Sig: TAKE 1 TABLET BY MOUTH EVERY DAY    metoprolol succinate (TOPROL XL) 25 MG extended release tablet [Pharmacy Med Name: Metoprolol Succinate ER Oral Tablet Extended Release 24 Hour 25 MG] 45 tablet 1     Sig: TAKE 1/2 TABLET BY MOUTH EVERY DAY    pantoprazole (PROTONIX) 40 MG tablet [Pharmacy Med Name: Pantoprazole Sodium Oral Tablet Delayed Release 40 MG] 90 tablet 1     Sig: TAKE 1 TABLET BY MOUTH EVERY DAY

## 2023-08-08 RX ORDER — POTASSIUM CHLORIDE 20 MEQ/1
TABLET, EXTENDED RELEASE ORAL
Qty: 90 TABLET | Refills: 1 | Status: SHIPPED | OUTPATIENT
Start: 2023-08-08

## 2023-08-08 RX ORDER — METOPROLOL SUCCINATE 25 MG/1
TABLET, EXTENDED RELEASE ORAL
Qty: 45 TABLET | Refills: 1 | Status: SHIPPED | OUTPATIENT
Start: 2023-08-08

## 2023-08-08 RX ORDER — PANTOPRAZOLE SODIUM 40 MG/1
TABLET, DELAYED RELEASE ORAL
Qty: 90 TABLET | Refills: 1 | Status: SHIPPED | OUTPATIENT
Start: 2023-08-08

## 2023-08-11 ENCOUNTER — OFFICE VISIT (OUTPATIENT)
Dept: FAMILY MEDICINE CLINIC | Age: 78
End: 2023-08-11

## 2023-08-11 VITALS
SYSTOLIC BLOOD PRESSURE: 110 MMHG | DIASTOLIC BLOOD PRESSURE: 62 MMHG | HEIGHT: 69 IN | RESPIRATION RATE: 12 BRPM | WEIGHT: 222.38 LBS | HEART RATE: 60 BPM | BODY MASS INDEX: 32.94 KG/M2

## 2023-08-11 DIAGNOSIS — I25.10 CORONARY ARTERY DISEASE INVOLVING NATIVE CORONARY ARTERY OF NATIVE HEART WITHOUT ANGINA PECTORIS: ICD-10-CM

## 2023-08-11 DIAGNOSIS — G89.29 CHRONIC MIDLINE LOW BACK PAIN WITHOUT SCIATICA: ICD-10-CM

## 2023-08-11 DIAGNOSIS — Z95.820 S/P ANGIOPLASTY WITH STENT: ICD-10-CM

## 2023-08-11 DIAGNOSIS — I50.32 CHRONIC DIASTOLIC CONGESTIVE HEART FAILURE (HCC): ICD-10-CM

## 2023-08-11 DIAGNOSIS — I35.0 MODERATE AORTIC STENOSIS: ICD-10-CM

## 2023-08-11 DIAGNOSIS — M54.50 CHRONIC MIDLINE LOW BACK PAIN WITHOUT SCIATICA: ICD-10-CM

## 2023-08-11 DIAGNOSIS — R39.12 BENIGN PROSTATIC HYPERPLASIA WITH WEAK URINARY STREAM: ICD-10-CM

## 2023-08-11 DIAGNOSIS — N40.0 BENIGN PROSTATIC HYPERPLASIA WITHOUT LOWER URINARY TRACT SYMPTOMS: ICD-10-CM

## 2023-08-11 DIAGNOSIS — D68.9 COAGULOPATHY (HCC): ICD-10-CM

## 2023-08-11 DIAGNOSIS — N40.1 BENIGN PROSTATIC HYPERPLASIA WITH WEAK URINARY STREAM: ICD-10-CM

## 2023-08-11 DIAGNOSIS — E78.00 PURE HYPERCHOLESTEROLEMIA: ICD-10-CM

## 2023-08-11 DIAGNOSIS — K21.9 GASTROESOPHAGEAL REFLUX DISEASE WITHOUT ESOPHAGITIS: ICD-10-CM

## 2023-08-11 DIAGNOSIS — I10 ESSENTIAL HYPERTENSION: Primary | ICD-10-CM

## 2023-08-13 ASSESSMENT — ENCOUNTER SYMPTOMS
ABDOMINAL PAIN: 0
CHEST TIGHTNESS: 0
ORTHOPNEA: 0
EYE PAIN: 0
SORE THROAT: 0
NAUSEA: 0
SHORTNESS OF BREATH: 0
COUGH: 0
BACK PAIN: 0
TROUBLE SWALLOWING: 0
BLOOD IN STOOL: 0
CONSTIPATION: 0

## 2023-09-25 DIAGNOSIS — I25.10 CORONARY ARTERY DISEASE INVOLVING NATIVE CORONARY ARTERY OF NATIVE HEART WITHOUT ANGINA PECTORIS: ICD-10-CM

## 2023-09-25 NOTE — TELEPHONE ENCOUNTER
Date of last visit:  8/11/2023  Date of next visit:  11/21/2023    Requested Prescriptions     Pending Prescriptions Disp Refills    warfarin (Valerianoberkley Samuelcarolyn) 5 MG tablet [[facility]] 90 tablet 0     Sig: TAKE by mouth AS DIRECTED BY COUMADIN CLINIC

## 2023-09-26 RX ORDER — WARFARIN SODIUM 5 MG/1
TABLET ORAL
Qty: 90 TABLET | Refills: 0 | Status: SHIPPED | OUTPATIENT
Start: 2023-09-26

## 2023-11-09 ENCOUNTER — TELEPHONE (OUTPATIENT)
Dept: FAMILY MEDICINE CLINIC | Age: 78
End: 2023-11-09

## 2023-11-09 ENCOUNTER — OFFICE VISIT (OUTPATIENT)
Dept: FAMILY MEDICINE CLINIC | Age: 78
End: 2023-11-09

## 2023-11-09 ENCOUNTER — HOSPITAL ENCOUNTER (OUTPATIENT)
Age: 78
Discharge: HOME | End: 2023-11-09
Payer: MEDICARE

## 2023-11-09 VITALS
HEIGHT: 69 IN | HEART RATE: 76 BPM | RESPIRATION RATE: 16 BRPM | WEIGHT: 226.38 LBS | DIASTOLIC BLOOD PRESSURE: 74 MMHG | BODY MASS INDEX: 33.53 KG/M2 | SYSTOLIC BLOOD PRESSURE: 118 MMHG

## 2023-11-09 DIAGNOSIS — J98.8 RESPIRATORY TRACT INFECTION DUE TO COVID-19 VIRUS: Primary | ICD-10-CM

## 2023-11-09 DIAGNOSIS — U07.1 RESPIRATORY TRACT INFECTION DUE TO COVID-19 VIRUS: Primary | ICD-10-CM

## 2023-11-09 DIAGNOSIS — J20.9 ACUTE BRONCHITIS, UNSPECIFIED ORGANISM: Primary | ICD-10-CM

## 2023-11-09 LAB
FLUAV RNA RESP QL NAA+PROBE: NOT DETECTED
FLUBV RNA RESP QL NAA+PROBE: NOT DETECTED
SARS-COV-2 RNA RESP QL NAA+PROBE: DETECTED

## 2023-11-09 PROCEDURE — 87636 SARSCOV2 & INF A&B AMP PRB: CPT

## 2023-11-09 RX ORDER — ZINC GLUCONATE 50 MG
50 TABLET ORAL DAILY
COMMUNITY

## 2023-11-09 RX ORDER — ASCORBIC ACID 500 MG
500 TABLET ORAL 2 TIMES DAILY
COMMUNITY

## 2023-11-09 RX ORDER — ACETAMINOPHEN 160 MG
1 TABLET,DISINTEGRATING ORAL DAILY
COMMUNITY

## 2023-11-09 ASSESSMENT — ENCOUNTER SYMPTOMS
COUGH: 1
CONSTIPATION: 0
BLOOD IN STOOL: 0
TROUBLE SWALLOWING: 0
SORE THROAT: 1
NAUSEA: 0
ABDOMINAL PAIN: 0
CHEST TIGHTNESS: 0
SHORTNESS OF BREATH: 0
BACK PAIN: 0
EYE PAIN: 0

## 2023-11-09 NOTE — TELEPHONE ENCOUNTER
Per verbal order  pt did test postitive for covid. Needs to start a regimen for Vitamin C 500mg twice daily, Zinc 50mg once daily, Vitamin D 2000 units once daily. Called pt no answer, left message for pt call office back. He can get medication over the counter. He should remain in quarantine until Saturday 11/11/23. Then he should wear a mask for 5 days after the above date.

## 2023-11-09 NOTE — TELEPHONE ENCOUNTER
Pt called back was informed. Wanted to know if  was still going to send in the cough syrup for him to pharmacy ?   [address] [facility],[address]

## 2023-11-09 NOTE — PROGRESS NOTES
Subjective:      Patient ID: Nidhi Brush is a 77 y.o. male. Up    with  achiness  noted            Cough  This is a new problem. Episode onset: 3  days. The problem has been gradually improving. The cough is Non-productive. Associated symptoms include nasal congestion, postnasal drip and a sore throat. Pertinent negatives include no chest pain, ear pain, fever, headaches, rash or shortness of breath. Associated symptoms comments: Achiness  and      cough  11-6-23. Past Medical History:   Diagnosis Date    CAD (coronary artery disease) 01/2006    cath with stent lad    Chronic back pain 2013      epidural block    Colon polyps 10/2005    Diverticulosis of colon     DVT of proximal leg (deep vein thrombosis) ([address]) 12/04 2013    left     right  in 2013    DVT of proximal leg (deep vein thrombosis) ([facility])     left     GERD (gastroesophageal reflux disease)     Kidney stone on left side 12/2020    left  side  in  2009  also    Pulmonary embolus ([address]) 6-2013 and  1-2015     had   right  leg  dvt    S/P cardiac cath 2014 at [facility]- patent LAD stent done 2006 and 40 to 50% lesions in other coronaries 04/12/2019    S/P cardiac cath 4/17/19- large all ectatic vessel, lm-p, lad prox 40%, mid 50%, distal 40 distal stent -patent, lcx prox 40%, rca prox 30%, dist 40%, edp 11, ef 50%- med  RX 04/17/2019    SOB (shortness of breath) on exertion 06/08/2020    Unspecified sleep apnea 2013    cpap        Review of Systems   Constitutional:  Negative for fatigue and fever. HENT:  Positive for congestion, postnasal drip and sore throat. Negative for ear pain and trouble swallowing. Eyes:  Negative for pain. Respiratory:  Positive for cough. Negative for chest tightness and shortness of breath. Cardiovascular:  Negative for chest pain, palpitations and leg swelling. Gastrointestinal:  Negative for abdominal pain, blood in stool, constipation and nausea.    Genitourinary:  Negative for difficulty urinating,

## 2023-11-10 ENCOUNTER — TELEPHONE (OUTPATIENT)
Dept: FAMILY MEDICINE CLINIC | Age: 78
End: 2023-11-10

## 2023-11-10 RX ORDER — DEXTROMETHORPHAN HYDROBROMIDE AND PROMETHAZINE HYDROCHLORIDE 15; 6.25 MG/5ML; MG/5ML
5 SYRUP ORAL EVERY 6 HOURS PRN
Qty: 240 ML | Refills: 1 | Status: SHIPPED | OUTPATIENT
Start: 2023-11-10

## 2023-11-10 NOTE — TELEPHONE ENCOUNTER
----- Message from Bradley Neves MD sent at 11/9/2023 11:34 PM EST -----    Positive covid so   vit d 2000 units a day and vit c 500 mg bid andz inc 40 mcg for 4 weeks     On coumadin so no paxlovid      Limit contact till day 5 or Saturday and  then ok out as long as keep a mask on for another 5 days

## 2023-11-20 RX ORDER — SPIRONOLACTONE AND HYDROCHLOROTHIAZIDE 25; 25 MG/1; MG/1
TABLET ORAL
Qty: 45 TABLET | Refills: 0 | Status: SHIPPED | OUTPATIENT
Start: 2023-11-20

## 2023-11-21 ENCOUNTER — OFFICE VISIT (OUTPATIENT)
Dept: FAMILY MEDICINE CLINIC | Age: 78
End: 2023-11-21

## 2023-11-21 VITALS
HEIGHT: 69 IN | DIASTOLIC BLOOD PRESSURE: 72 MMHG | HEART RATE: 68 BPM | SYSTOLIC BLOOD PRESSURE: 122 MMHG | BODY MASS INDEX: 33.92 KG/M2 | WEIGHT: 229 LBS | RESPIRATION RATE: 16 BRPM

## 2023-11-21 DIAGNOSIS — I10 ESSENTIAL HYPERTENSION: Primary | ICD-10-CM

## 2023-11-21 DIAGNOSIS — I35.0 MODERATE AORTIC STENOSIS: ICD-10-CM

## 2023-11-21 DIAGNOSIS — G47.33 OBSTRUCTIVE SLEEP APNEA SYNDROME: ICD-10-CM

## 2023-11-21 DIAGNOSIS — E78.00 PURE HYPERCHOLESTEROLEMIA: Chronic | ICD-10-CM

## 2023-11-21 DIAGNOSIS — I50.32 CHRONIC DIASTOLIC CONGESTIVE HEART FAILURE (HCC): ICD-10-CM

## 2023-11-21 DIAGNOSIS — M54.50 CHRONIC MIDLINE LOW BACK PAIN WITHOUT SCIATICA: ICD-10-CM

## 2023-11-21 DIAGNOSIS — K21.9 GASTROESOPHAGEAL REFLUX DISEASE WITHOUT ESOPHAGITIS: ICD-10-CM

## 2023-11-21 DIAGNOSIS — G89.29 CHRONIC MIDLINE LOW BACK PAIN WITHOUT SCIATICA: ICD-10-CM

## 2023-11-21 ASSESSMENT — ENCOUNTER SYMPTOMS
CONSTIPATION: 0
SORE THROAT: 0
ABDOMINAL PAIN: 0
SHORTNESS OF BREATH: 0
ORTHOPNEA: 0
BLOOD IN STOOL: 0
BACK PAIN: 0
COUGH: 0
EYE PAIN: 0
TROUBLE SWALLOWING: 0
CHEST TIGHTNESS: 0
NAUSEA: 0

## 2023-11-21 NOTE — PROGRESS NOTES
Assessment:        ICD-10-CM    1. Essential hypertension  I10       2. Chronic midline low back pain without sciatica  M54.50     G89.29       3. Gastroesophageal reflux disease without esophagitis  K21.9       4. Pure hypercholesterolemia  E78.00       5. Moderate aortic stenosis  I35.0       6. Obstructive sleep apnea syndrome  G47.33       7. Chronic diastolic congestive heart failure (HCC)  I50.32              Plan:      Current Outpatient Medications   Medication Sig Dispense Refill    spironolactone-hydroCHLOROthiazide (ALDACTAZIDE) 25-25 MG per tablet TAKE 1/2 TABLET BY MOUTH ONE TIME A DAY 45 tablet 0    promethazine-dextromethorphan (PROMETHAZINE-DM) 6.25-15 MG/5ML syrup Take 5 mLs by mouth every 6 hours as needed for Cough 240 mL 1    vitamin C (ASCORBIC ACID) 500 MG tablet Take 1 tablet by mouth 2 times daily      Cholecalciferol (VITAMIN D3) 50 MCG (2000 UT) CAPS Take 1 capsule by mouth daily      zinc gluconate 50 MG tablet Take 1 tablet by mouth daily      warfarin (JANTOVEN) 5 MG tablet TAKE by mouth AS DIRECTED BY COUMADIN CLINIC 90 tablet 0    potassium chloride (KLOR-CON M) 20 MEQ extended release tablet TAKE 1 TABLET BY MOUTH EVERY DAY 90 tablet 1    metoprolol succinate (TOPROL XL) 25 MG extended release tablet TAKE 1/2 TABLET BY MOUTH EVERY DAY 45 tablet 1    pantoprazole (PROTONIX) 40 MG tablet TAKE 1 TABLET BY MOUTH EVERY DAY 90 tablet 1    tiZANidine HCl (ZANAFLEX PO) Take by mouth      atorvastatin (LIPITOR) 80 MG tablet TAKE 1 TABLET BY MOUTH EVERY DAY 90 tablet 1    folic acid (FOLVITE) 1 MG tablet TAKE 1 TABLET BY MOUTH EVERY DAY 90 tablet 1    tamsulosin (FLOMAX) 0.4 MG capsule Take 1 capsule by mouth daily 30 capsule 0    clotrimazole-betamethasone (LOTRISONE) 1-0.05 % cream Apply topically 2 times daily. Lotrisone 1-0.05% Cream 45 g 3    nitroGLYCERIN (NITROSTAT) 0.4 MG SL tablet up to max of 3 total doses.  If no relief after 1 dose, call [phone]. 25 tablet 3    Ascorbic Acid

## 2024-01-04 ENCOUNTER — OFFICE VISIT (OUTPATIENT)
Dept: CARDIOLOGY CLINIC | Age: 79
End: 2024-01-04

## 2024-01-04 VITALS
WEIGHT: 230.6 LBS | SYSTOLIC BLOOD PRESSURE: 127 MMHG | HEIGHT: 70 IN | HEART RATE: 61 BPM | BODY MASS INDEX: 33.01 KG/M2 | DIASTOLIC BLOOD PRESSURE: 74 MMHG

## 2024-01-04 DIAGNOSIS — R00.1 SINUS BRADYCARDIA: ICD-10-CM

## 2024-01-04 DIAGNOSIS — R06.09 DOE (DYSPNEA ON EXERTION): ICD-10-CM

## 2024-01-04 DIAGNOSIS — Z95.820 S/P ANGIOPLASTY WITH STENT: ICD-10-CM

## 2024-01-04 DIAGNOSIS — I35.0 MODERATE AORTIC STENOSIS: ICD-10-CM

## 2024-01-04 DIAGNOSIS — E78.00 PURE HYPERCHOLESTEROLEMIA: Chronic | ICD-10-CM

## 2024-01-04 DIAGNOSIS — I25.10 CORONARY ARTERY DISEASE INVOLVING NATIVE CORONARY ARTERY OF NATIVE HEART WITHOUT ANGINA PECTORIS: Primary | ICD-10-CM

## 2024-01-04 DIAGNOSIS — I42.0 DILATED CARDIOMYOPATHY (HCC): ICD-10-CM

## 2024-01-04 DIAGNOSIS — I10 ESSENTIAL HYPERTENSION: ICD-10-CM

## 2024-01-04 DIAGNOSIS — Z98.890 S/P CARDIAC CATH: ICD-10-CM

## 2024-01-04 DIAGNOSIS — I50.22 CHRONIC SYSTOLIC (CONGESTIVE) HEART FAILURE (HCC): ICD-10-CM

## 2024-01-04 PROBLEM — I50.32 CHRONIC DIASTOLIC CONGESTIVE HEART FAILURE (HCC): Status: RESOLVED | Noted: 2018-12-19 | Resolved: 2024-01-04

## 2024-01-04 NOTE — PROGRESS NOTES
Chief Complaint   Patient presents with    6 Month Follow-Up    Coronary Artery Disease   Former pat of Dr. Gregory  Hx of CAD s/p stent 8yrs back        Pt here for a 6 month f/u -    EKG done today.    Losing wt    Denied Chest pain , palpitations or  Dizziness    No Hx of syncope    Still active and working    Leg edema better after aldactazide  No leg edema today  No wt gain    Sob on exertion - chronic-unchanged    No wt gain      Patient Seen, Chart, Consults notes, Labs, Radiology studies reviewed.            Patient Active Problem List   Diagnosis    CAD (coronary artery disease)    Diverticulosis of colon    Hyperlipidemia    Obesity    GERD (gastroesophageal reflux disease)    Colon polyps    Sleep apnea    Chronic back pain    S/P cardiac cath 2014 at [facility]- patent LAD stent done 2006 and 40 to 50% lesions in other coronaries    S/P angioplasty with stent of the LAD 2014    Essential hypertension    Sinus bradycardia    Chronic systolic (congestive) heart failure    Dilated cardiomyopathy (HCC)    Moderate aortic stenosis       Past Surgical History:   Procedure Laterality Date    BACK SURGERY  5-    Dr Mayen @ [facility])     CARDIAC CATHETERIZATION  2014       50%  lesion    joyce    CARDIAC CATHETERIZATION  04/2019      dr lam  and  diffuse  disease  30 to 50%    COLONOSCOPY  2012 2020    sheik    polyps   due   2025    CORONARY ANGIOPLASTY WITH STENT PLACEMENT  2006    LAD    EYE SURGERY  2011    bilateral    HERNIA REPAIR  11/06    umbilical    JOINT REPLACEMENT Right 04/2019     [place]  right  hip    VENA CAVA FILTER PLACEMENT  2016    placed  iin  1-2014  and  removed  5-2016       No Known Allergies     Family History   Problem Relation Age of Onset    Heart Disease Father         Social History     Socioeconomic History    Marital status:      Spouse name: Not on file    Number of children: Not on file    Years of education: Not on file    Highest

## 2024-01-10 ENCOUNTER — NURSE ONLY (OUTPATIENT)
Dept: LAB | Age: 79
End: 2024-01-10

## 2024-01-10 DIAGNOSIS — R06.09 DOE (DYSPNEA ON EXERTION): ICD-10-CM

## 2024-01-10 DIAGNOSIS — Z95.820 S/P ANGIOPLASTY WITH STENT: ICD-10-CM

## 2024-01-10 DIAGNOSIS — I25.10 CORONARY ARTERY DISEASE INVOLVING NATIVE CORONARY ARTERY OF NATIVE HEART WITHOUT ANGINA PECTORIS: ICD-10-CM

## 2024-01-10 DIAGNOSIS — R00.1 SINUS BRADYCARDIA: ICD-10-CM

## 2024-01-10 DIAGNOSIS — I42.0 DILATED CARDIOMYOPATHY (HCC): ICD-10-CM

## 2024-01-10 DIAGNOSIS — E78.00 PURE HYPERCHOLESTEROLEMIA: Chronic | ICD-10-CM

## 2024-01-10 DIAGNOSIS — I35.0 MODERATE AORTIC STENOSIS: ICD-10-CM

## 2024-01-10 DIAGNOSIS — I50.22 CHRONIC SYSTOLIC (CONGESTIVE) HEART FAILURE (HCC): ICD-10-CM

## 2024-01-10 DIAGNOSIS — I10 ESSENTIAL HYPERTENSION: ICD-10-CM

## 2024-01-10 DIAGNOSIS — Z98.890 S/P CARDIAC CATH: ICD-10-CM

## 2024-01-10 LAB
ALBUMIN SERPL BCG-MCNC: 3.8 G/DL (ref 3.5–5.1)
ALP SERPL-CCNC: 82 U/L (ref 38–126)
ALT SERPL W/O P-5'-P-CCNC: 29 U/L (ref 11–66)
ANION GAP SERPL CALC-SCNC: 10 MEQ/L (ref 8–16)
AST SERPL-CCNC: 31 U/L (ref 5–40)
BILIRUB CONJ SERPL-MCNC: < 0.2 MG/DL (ref 0–0.3)
BILIRUB SERPL-MCNC: 0.8 MG/DL (ref 0.3–1.2)
BUN SERPL-MCNC: 16 MG/DL (ref 7–22)
CALCIUM SERPL-MCNC: 9.5 MG/DL (ref 8.5–10.5)
CHLORIDE SERPL-SCNC: 104 MEQ/L (ref 98–111)
CHOLEST SERPL-MCNC: 153 MG/DL (ref 100–199)
CO2 SERPL-SCNC: 25 MEQ/L (ref 23–33)
CREAT SERPL-MCNC: 0.9 MG/DL (ref 0.4–1.2)
GFR SERPL CREATININE-BSD FRML MDRD: > 60 ML/MIN/1.73M2
GLUCOSE SERPL-MCNC: 111 MG/DL (ref 70–108)
HDLC SERPL-MCNC: 48 MG/DL
LDLC SERPL CALC-MCNC: 85 MG/DL
MAGNESIUM SERPL-MCNC: 2 MG/DL (ref 1.6–2.4)
POTASSIUM SERPL-SCNC: 4 MEQ/L (ref 3.5–5.2)
PROT SERPL-MCNC: 6.8 G/DL (ref 6.1–8)
SODIUM SERPL-SCNC: 139 MEQ/L (ref 135–145)
TRIGL SERPL-MCNC: 99 MG/DL (ref 0–199)

## 2024-01-14 DIAGNOSIS — E78.00 PURE HYPERCHOLESTEROLEMIA: Chronic | ICD-10-CM

## 2024-01-14 DIAGNOSIS — D68.9 COAGULOPATHY (HCC): ICD-10-CM

## 2024-01-15 NOTE — TELEPHONE ENCOUNTER
The pharmacy is  requesting a refill of the below medication which has been pended for you:     Requested Prescriptions     Pending Prescriptions Disp Refills    atorvastatin (LIPITOR) 80 MG tablet [Pharmacy Med Name: Atorvastatin Calcium Oral Tablet 80 MG] 90 tablet 1     Sig: TAKE 1 TABLET BY MOUTH EVERY DAY    folic acid (FOLVITE) 1 MG tablet [Pharmacy Med Name: Folic Acid Oral Tablet 1 MG] 90 tablet 1     Sig: TAKE 1 TABLET BY MOUTH EVERY DAY       Last Appointment Date: 11/21/2023  Next Appointment Date: 2/22/2024    No Known Allergies

## 2024-01-16 RX ORDER — ATORVASTATIN CALCIUM 80 MG/1
TABLET, FILM COATED ORAL
Qty: 90 TABLET | Refills: 1 | Status: SHIPPED | OUTPATIENT
Start: 2024-01-16

## 2024-01-16 RX ORDER — FOLIC ACID 1 MG/1
TABLET ORAL
Qty: 90 TABLET | Refills: 1 | Status: SHIPPED | OUTPATIENT
Start: 2024-01-16

## 2024-02-02 RX ORDER — PANTOPRAZOLE SODIUM 40 MG/1
TABLET, DELAYED RELEASE ORAL
Qty: 90 TABLET | Refills: 1 | Status: SHIPPED | OUTPATIENT
Start: 2024-02-02

## 2024-02-02 NOTE — TELEPHONE ENCOUNTER
Date of last visit:  11/21/2023  Date of next visit:  2/22/2024    Requested Prescriptions     Pending Prescriptions Disp Refills    pantoprazole (PROTONIX) 40 MG tablet [Pharmacy Med Name: Pantoprazole Sodium Oral Tablet Delayed Release 40 MG] 90 tablet 1     Sig: TAKE 1 TABLET BY MOUTH EVERY DAY

## 2024-02-07 DIAGNOSIS — I25.10 CORONARY ARTERY DISEASE INVOLVING NATIVE CORONARY ARTERY OF NATIVE HEART WITHOUT ANGINA PECTORIS: ICD-10-CM

## 2024-02-07 RX ORDER — METOPROLOL SUCCINATE 25 MG/1
TABLET, EXTENDED RELEASE ORAL
Qty: 45 TABLET | Refills: 0 | Status: SHIPPED | OUTPATIENT
Start: 2024-02-07

## 2024-02-07 RX ORDER — POTASSIUM CHLORIDE 20 MEQ/1
TABLET, EXTENDED RELEASE ORAL
Qty: 90 TABLET | Refills: 0 | Status: SHIPPED | OUTPATIENT
Start: 2024-02-07

## 2024-02-07 NOTE — TELEPHONE ENCOUNTER
Date of last visit:  11/21/2023  Date of next visit:  2/22/2024    Requested Prescriptions     Pending Prescriptions Disp Refills    potassium chloride (KLOR-CON M) 20 MEQ extended release tablet [Pharmacy Med Name: Potassium Chloride Trinh ER Oral Tablet Extended Release 20 MEQ] 90 tablet 0     Sig: TAKE 1 TABLET BY MOUTH EVERY DAY    metoprolol succinate (TOPROL XL) 25 MG extended release tablet [Pharmacy Med Name: Metoprolol Succinate ER Oral Tablet Extended Release 24 Hour 25 MG] 45 tablet 0     Sig: TAKE 1/2 TABLET BY MOUTH EVERY DAY

## 2024-02-13 DIAGNOSIS — I25.10 CORONARY ARTERY DISEASE INVOLVING NATIVE CORONARY ARTERY OF NATIVE HEART WITHOUT ANGINA PECTORIS: ICD-10-CM

## 2024-02-13 NOTE — TELEPHONE ENCOUNTER
Date of last visit:  11/21/2023  Date of next visit:  2/22/2024    Requested Prescriptions     Pending Prescriptions Disp Refills    warfarin (JANTOVEN) 5 MG tablet [Pharmacy Med Name: Jantoven Oral Tablet 5 MG] 90 tablet 0     Sig: TAKE by mouth AS DIRECTED BY COUMADIN CLINIC

## 2024-02-14 RX ORDER — WARFARIN SODIUM 5 MG/1
TABLET ORAL
Qty: 90 TABLET | Refills: 0 | Status: SHIPPED | OUTPATIENT
Start: 2024-02-14

## 2024-02-14 RX ORDER — SPIRONOLACTONE AND HYDROCHLOROTHIAZIDE 25; 25 MG/1; MG/1
0.5 TABLET ORAL DAILY
Qty: 45 TABLET | Refills: 3 | Status: SHIPPED | OUTPATIENT
Start: 2024-02-14

## 2024-02-22 ENCOUNTER — OFFICE VISIT (OUTPATIENT)
Dept: FAMILY MEDICINE CLINIC | Age: 79
End: 2024-02-22

## 2024-02-22 VITALS
SYSTOLIC BLOOD PRESSURE: 118 MMHG | DIASTOLIC BLOOD PRESSURE: 74 MMHG | BODY MASS INDEX: 33.52 KG/M2 | RESPIRATION RATE: 16 BRPM | WEIGHT: 234.13 LBS | HEART RATE: 60 BPM | HEIGHT: 70 IN

## 2024-02-22 DIAGNOSIS — I25.10 CORONARY ARTERY DISEASE INVOLVING NATIVE CORONARY ARTERY OF NATIVE HEART WITHOUT ANGINA PECTORIS: ICD-10-CM

## 2024-02-22 DIAGNOSIS — K21.9 GASTROESOPHAGEAL REFLUX DISEASE WITHOUT ESOPHAGITIS: ICD-10-CM

## 2024-02-22 DIAGNOSIS — E78.00 PURE HYPERCHOLESTEROLEMIA: Chronic | ICD-10-CM

## 2024-02-22 DIAGNOSIS — Z00.00 MEDICARE ANNUAL WELLNESS VISIT, SUBSEQUENT: Primary | ICD-10-CM

## 2024-02-22 DIAGNOSIS — D68.9 COAGULOPATHY (HCC): ICD-10-CM

## 2024-02-22 DIAGNOSIS — G47.33 OBSTRUCTIVE SLEEP APNEA SYNDROME: ICD-10-CM

## 2024-02-22 DIAGNOSIS — I35.0 MODERATE AORTIC STENOSIS: ICD-10-CM

## 2024-02-22 DIAGNOSIS — I10 ESSENTIAL HYPERTENSION: ICD-10-CM

## 2024-02-22 DIAGNOSIS — Z91.89 STREPTOCOCCUS PNEUMONIAE VACCINATION INDICATED: ICD-10-CM

## 2024-02-22 DIAGNOSIS — K63.5 POLYP OF COLON, UNSPECIFIED PART OF COLON, UNSPECIFIED TYPE: ICD-10-CM

## 2024-02-22 SDOH — ECONOMIC STABILITY: INCOME INSECURITY: HOW HARD IS IT FOR YOU TO PAY FOR THE VERY BASICS LIKE FOOD, HOUSING, MEDICAL CARE, AND HEATING?: NOT HARD AT ALL

## 2024-02-22 SDOH — ECONOMIC STABILITY: FOOD INSECURITY: WITHIN THE PAST 12 MONTHS, YOU WORRIED THAT YOUR FOOD WOULD RUN OUT BEFORE YOU GOT MONEY TO BUY MORE.: NEVER TRUE

## 2024-02-22 SDOH — ECONOMIC STABILITY: FOOD INSECURITY: WITHIN THE PAST 12 MONTHS, THE FOOD YOU BOUGHT JUST DIDN'T LAST AND YOU DIDN'T HAVE MONEY TO GET MORE.: NEVER TRUE

## 2024-02-22 ASSESSMENT — ENCOUNTER SYMPTOMS
CONSTIPATION: 0
ABDOMINAL PAIN: 0
EYE PAIN: 0
BACK PAIN: 0
TROUBLE SWALLOWING: 0
BLOOD IN STOOL: 0
SHORTNESS OF BREATH: 0
SORE THROAT: 0
NAUSEA: 0
CHEST TIGHTNESS: 0
COUGH: 0

## 2024-02-22 ASSESSMENT — PATIENT HEALTH QUESTIONNAIRE - PHQ9
SUM OF ALL RESPONSES TO PHQ QUESTIONS 1-9: 0
1. LITTLE INTEREST OR PLEASURE IN DOING THINGS: 0
SUM OF ALL RESPONSES TO PHQ QUESTIONS 1-9: 0
2. FEELING DOWN, DEPRESSED OR HOPELESS: 0
SUM OF ALL RESPONSES TO PHQ9 QUESTIONS 1 & 2: 0

## 2024-02-22 ASSESSMENT — LIFESTYLE VARIABLES
HOW MANY STANDARD DRINKS CONTAINING ALCOHOL DO YOU HAVE ON A TYPICAL DAY: 1 OR 2
HOW OFTEN DO YOU HAVE A DRINK CONTAINING ALCOHOL: 2-3 TIMES A WEEK

## 2024-02-22 NOTE — PROGRESS NOTES
Immunizations Administered       Name Date Dose Route    Pneumococcal, PCV20, PREVNAR 20, (age 6w+), IM, 0.5mL 2/22/2024 0.5 mL Intramuscular    Site: Deltoid- Right    Lot: [id number]    NDC: 3097-7270-15          
difficulty urinating, frequency and urgency.   Musculoskeletal:  Negative for arthralgias, back pain, joint swelling and neck stiffness.   Skin:  Negative for rash.   Neurological:  Negative for dizziness, weakness and headaches.   Hematological:  Negative for adenopathy. Does not bruise/bleed easily.   Psychiatric/Behavioral:  Negative for behavioral problems, dysphoric mood and sleep disturbance.         /74 (Site: Right Upper Arm, Position: Sitting, Cuff Size: Medium Adult)   Pulse 60   Resp 16   Ht 1.778 m (5' 10\")   Wt 106.2 kg (234 lb 2 oz)   BMI 33.59 kg/m²    Objective   Physical Exam  Vitals and nursing note reviewed.   Constitutional:       Appearance: He is well-developed.   HENT:      Head: Normocephalic and atraumatic.      Right Ear: External ear normal.      Left Ear: External ear normal.      Nose: Nose normal.   Eyes:      Conjunctiva/sclera: Conjunctivae normal.      Pupils: Pupils are equal, round, and reactive to light.      Comments: Fundi nl   Neck:      Thyroid: No thyromegaly.   Cardiovascular:      Rate and Rhythm: Normal rate and regular rhythm.      Heart sounds: Normal heart sounds. No murmur heard.  Pulmonary:      Effort: Pulmonary effort is normal.      Breath sounds: Normal breath sounds. No wheezing or rales.   Abdominal:      General: Bowel sounds are normal.      Palpations: Abdomen is soft. There is no mass.      Tenderness: There is no abdominal tenderness.   Musculoskeletal:         General: Normal range of motion.      Cervical back: Normal range of motion and neck supple.   Lymphadenopathy:      Cervical: No cervical adenopathy.   Skin:     General: Skin is warm and dry.      Findings: No rash.   Neurological:      Mental Status: He is alert and oriented to person, place, and time.      Cranial Nerves: No cranial nerve deficit.      Deep Tendon Reflexes: Reflexes are normal and symmetric.                  An electronic signature was used to authenticate this

## 2024-03-11 ENCOUNTER — OFFICE VISIT (OUTPATIENT)
Dept: FAMILY MEDICINE CLINIC | Age: 79
End: 2024-03-11

## 2024-03-11 VITALS
WEIGHT: 224 LBS | HEIGHT: 70 IN | BODY MASS INDEX: 32.07 KG/M2 | SYSTOLIC BLOOD PRESSURE: 118 MMHG | RESPIRATION RATE: 18 BRPM | DIASTOLIC BLOOD PRESSURE: 72 MMHG | HEART RATE: 84 BPM

## 2024-03-11 DIAGNOSIS — K59.01 SLOW TRANSIT CONSTIPATION: ICD-10-CM

## 2024-03-11 DIAGNOSIS — I25.10 CORONARY ARTERY DISEASE INVOLVING NATIVE CORONARY ARTERY OF NATIVE HEART WITHOUT ANGINA PECTORIS: ICD-10-CM

## 2024-03-11 DIAGNOSIS — R10.84 GENERALIZED ABDOMINAL PAIN: Primary | ICD-10-CM

## 2024-03-11 PROCEDURE — 99213 OFFICE O/P EST LOW 20 MIN: CPT | Performed by: FAMILY MEDICINE

## 2024-03-11 PROCEDURE — 1123F ACP DISCUSS/DSCN MKR DOCD: CPT | Performed by: FAMILY MEDICINE

## 2024-03-11 RX ORDER — POLYETHYLENE GLYCOL 3350 17 G/17G
17 POWDER, FOR SOLUTION ORAL DAILY PRN
Qty: 510 G | Refills: 0 | Status: SHIPPED | OUTPATIENT
Start: 2024-03-11 | End: 2024-04-10

## 2024-03-11 ASSESSMENT — ENCOUNTER SYMPTOMS
CONSTIPATION: 1
BLOOD IN STOOL: 0
CHEST TIGHTNESS: 0
ABDOMINAL PAIN: 0
TROUBLE SWALLOWING: 0
SORE THROAT: 0
NAUSEA: 0
COUGH: 0
BACK PAIN: 0
ABDOMINAL DISTENTION: 1
EYE PAIN: 0
SHORTNESS OF BREATH: 0

## 2024-03-11 NOTE — PROGRESS NOTES
max of 3 total doses. If no relief after 1 dose, call 911. 25 tablet 3    Ascorbic Acid (VITAMIN C) 1000 MG tablet Take 1 tablet by mouth daily      Multiple Vitamins-Minerals (MULTI COMPLETE PO) Take 1 tablet by mouth daily      Omega-3 Fatty Acids (FISH OIL) 1000 MG CAPS Take 3 capsules by mouth daily      aspirin 81 MG EC tablet Take 1 tablet by mouth daily       No current facility-administered medications for this visit.            Miralax   daily  for  3   to 5  days and  observe  as  no  fever  or pain   .  If  not  better   then  lab   Santiago Tabor MD

## 2024-05-14 DIAGNOSIS — I25.10 CORONARY ARTERY DISEASE INVOLVING NATIVE CORONARY ARTERY OF NATIVE HEART WITHOUT ANGINA PECTORIS: ICD-10-CM

## 2024-05-14 RX ORDER — METOPROLOL SUCCINATE 25 MG/1
TABLET, EXTENDED RELEASE ORAL
Qty: 45 TABLET | Refills: 1 | Status: SHIPPED | OUTPATIENT
Start: 2024-05-14

## 2024-05-14 RX ORDER — POTASSIUM CHLORIDE 20 MEQ/1
TABLET, EXTENDED RELEASE ORAL
Qty: 90 TABLET | Refills: 1 | Status: SHIPPED | OUTPATIENT
Start: 2024-05-14

## 2024-05-14 NOTE — TELEPHONE ENCOUNTER
Date of last visit:  3/11/2024  Date of next visit:  5/28/2024    Requested Prescriptions     Pending Prescriptions Disp Refills    metoprolol succinate (TOPROL XL) 25 MG extended release tablet [Pharmacy Med Name: Metoprolol Succinate ER Oral Tablet Extended Release 24 Hour 25 MG] 45 tablet 1     Sig: TAKE 1/2 TABLET BY MOUTH EVERY DAY    potassium chloride (KLOR-CON M) 20 MEQ extended release tablet [Pharmacy Med Name: Potassium Chloride Trinh ER Oral Tablet Extended Release 20 MEQ] 90 tablet 1     Sig: TAKE 1 TABLET BY MOUTH EVERY DAY

## 2024-05-18 DIAGNOSIS — I25.10 CORONARY ARTERY DISEASE INVOLVING NATIVE CORONARY ARTERY OF NATIVE HEART WITHOUT ANGINA PECTORIS: ICD-10-CM

## 2024-05-20 RX ORDER — WARFARIN SODIUM 5 MG/1
TABLET ORAL
Qty: 90 TABLET | Refills: 0 | Status: SHIPPED | OUTPATIENT
Start: 2024-05-20

## 2024-05-20 NOTE — TELEPHONE ENCOUNTER
Date of last visit:  3/11/2024  Date of next visit:  5/28/2024    Requested Prescriptions     Signed Prescriptions Disp Refills    warfarin (JANTOVEN) 5 MG tablet 90 tablet 0     Sig: TAKE by mouth AS DIRECTED BY COUMADIN CLINIC     Authorizing Provider: MASON MONTEIRO     Ordering User: NITO ADAM

## 2024-05-28 ENCOUNTER — OFFICE VISIT (OUTPATIENT)
Dept: FAMILY MEDICINE CLINIC | Age: 79
End: 2024-05-28

## 2024-05-28 VITALS
HEIGHT: 70 IN | RESPIRATION RATE: 16 BRPM | HEART RATE: 56 BPM | DIASTOLIC BLOOD PRESSURE: 68 MMHG | SYSTOLIC BLOOD PRESSURE: 118 MMHG | BODY MASS INDEX: 32.35 KG/M2 | WEIGHT: 226 LBS

## 2024-05-28 DIAGNOSIS — K21.9 GASTROESOPHAGEAL REFLUX DISEASE WITHOUT ESOPHAGITIS: ICD-10-CM

## 2024-05-28 DIAGNOSIS — I50.22 CHRONIC SYSTOLIC (CONGESTIVE) HEART FAILURE (HCC): ICD-10-CM

## 2024-05-28 DIAGNOSIS — I20.9 ANGINA PECTORIS, UNSPECIFIED (HCC): ICD-10-CM

## 2024-05-28 DIAGNOSIS — I25.119 ATHEROSCLEROSIS OF NATIVE CORONARY ARTERY OF NATIVE HEART WITH ANGINA PECTORIS (HCC): ICD-10-CM

## 2024-05-28 DIAGNOSIS — D68.9 COAGULOPATHY (HCC): ICD-10-CM

## 2024-05-28 DIAGNOSIS — G89.29 CHRONIC MIDLINE LOW BACK PAIN WITHOUT SCIATICA: ICD-10-CM

## 2024-05-28 DIAGNOSIS — R97.20 ELEVATED PSA, LESS THAN 10 NG/ML: ICD-10-CM

## 2024-05-28 DIAGNOSIS — I35.0 MODERATE AORTIC STENOSIS: ICD-10-CM

## 2024-05-28 DIAGNOSIS — N41.0 PROSTATITIS, ACUTE: ICD-10-CM

## 2024-05-28 DIAGNOSIS — E78.00 PURE HYPERCHOLESTEROLEMIA: Chronic | ICD-10-CM

## 2024-05-28 DIAGNOSIS — M54.50 CHRONIC MIDLINE LOW BACK PAIN WITHOUT SCIATICA: ICD-10-CM

## 2024-05-28 DIAGNOSIS — I10 ESSENTIAL HYPERTENSION: Primary | ICD-10-CM

## 2024-05-28 DIAGNOSIS — Z95.820 S/P ANGIOPLASTY WITH STENT: ICD-10-CM

## 2024-05-28 LAB
HEMOCCULT STL QL: NEGATIVE
HEMOCCULT STL QL: NORMAL
HEMOCCULT STL QL: NORMAL

## 2024-05-28 PROCEDURE — 99214 OFFICE O/P EST MOD 30 MIN: CPT | Performed by: FAMILY MEDICINE

## 2024-05-28 PROCEDURE — 82270 OCCULT BLOOD FECES: CPT | Performed by: FAMILY MEDICINE

## 2024-05-28 PROCEDURE — 1123F ACP DISCUSS/DSCN MKR DOCD: CPT | Performed by: FAMILY MEDICINE

## 2024-05-28 RX ORDER — DOXYCYCLINE HYCLATE 100 MG
100 TABLET ORAL 2 TIMES DAILY
Qty: 28 TABLET | Refills: 0 | Status: SHIPPED | OUTPATIENT
Start: 2024-05-28 | End: 2024-06-11

## 2024-05-28 ASSESSMENT — ENCOUNTER SYMPTOMS
CONSTIPATION: 0
TROUBLE SWALLOWING: 0
COUGH: 0
EYE PAIN: 0
STRIDOR: 0
ABDOMINAL PAIN: 0
NAUSEA: 0
BLOOD IN STOOL: 0
SHORTNESS OF BREATH: 0
BACK PAIN: 0
SORE THROAT: 0
CHEST TIGHTNESS: 0

## 2024-05-28 NOTE — PROGRESS NOTES
No components found for: \"CHLPL\"  Lab Results   Component Value Date    TRIG 139 05/04/2024     Lab Results   Component Value Date    HDL 48 05/04/2024     No components found for: \"LDLCALC\"  No components found for: \"LABVLDL\"    Lab Results   Component Value Date    ALT 30 05/04/2024    AST 29 05/04/2024    ALKPHOS 69 05/04/2024    BILITOT 1.4 (H) 05/04/2024           Is patient currently taking any cholesterol medications?     Yes   If yes, see med list as above    Is the patient reporting any side effects of cholesterol medications?   No    Is the patient taking any over the counter medications?    Yes   If yes, see med list as above    Is the patient taking a daily aspirin?    Yes      Patient Self-Management Goal for Chronic Condition  Goal: I will take all medications as prescribed by my doctor, and I will call the office if I am having any medication problems.  Barriers to success: none  Plan for overcoming my barriers: N/A     Confidence: 10/10  Date goal set: 5/28/24  Date goal attained:     Have you seen any other physician or provider since your last visit no    Have you had any other diagnostic tests since your last visit? no    Have you changed or stopped any medications since your last visit including any over-the-counter medicines, vitamins, or herbal medicines? no     Are you taking all your prescribed medications? Yes    If NO, why?           
mouth daily      Omega-3 Fatty Acids (FISH OIL) 1000 MG CAPS Take 3 capsules by mouth daily      aspirin 81 MG EC tablet Take 1 tablet by mouth daily       No current facility-administered medications for this visit.          Orders Placed This Encounter   Procedures    PSA, Prostatic Specific Antigen     Standing Status:   Future     Standing Expiration Date:   5/28/2025    POCT occult blood stool      Results for orders placed or performed in visit on 05/28/24   POCT occult blood stool   Result Value Ref Range    Occult Blood Fecal negative     Occult Blood Fecal      Occult Blood Fecal        Examination is noted be normal.  PSA was elevated up to 8 and we will treat with doxycycline.  Treat for 2 weeks.  Examination today prostate was normal at 3+ with no masses.  Repeat the PSA after Labor Day see back then at that time       Santiago Tabor MD

## 2024-06-11 ENCOUNTER — HOSPITAL ENCOUNTER (EMERGENCY)
Age: 79
Discharge: HOME | End: 2024-06-11
Payer: MEDICARE

## 2024-06-11 ENCOUNTER — APPOINTMENT (OUTPATIENT)
Dept: CT IMAGING | Age: 79
End: 2024-06-11
Payer: MEDICARE

## 2024-06-11 VITALS
BODY MASS INDEX: 32.58 KG/M2 | RESPIRATION RATE: 16 BRPM | TEMPERATURE: 98.3 F | HEIGHT: 69 IN | HEART RATE: 77 BPM | OXYGEN SATURATION: 99 % | DIASTOLIC BLOOD PRESSURE: 79 MMHG | SYSTOLIC BLOOD PRESSURE: 116 MMHG | WEIGHT: 220 LBS

## 2024-06-11 DIAGNOSIS — N20.0 KIDNEY STONE: Primary | ICD-10-CM

## 2024-06-11 DIAGNOSIS — T45.515A WARFARIN-INDUCED COAGULOPATHY (HCC): ICD-10-CM

## 2024-06-11 DIAGNOSIS — D68.32 WARFARIN-INDUCED COAGULOPATHY (HCC): ICD-10-CM

## 2024-06-11 LAB
ALBUMIN SERPL BCG-MCNC: 3.9 G/DL (ref 3.5–5.1)
ALP SERPL-CCNC: 87 U/L (ref 38–126)
ALT SERPL W/O P-5'-P-CCNC: 28 U/L (ref 11–66)
ANION GAP SERPL CALC-SCNC: 14 MEQ/L (ref 8–16)
AST SERPL-CCNC: 36 U/L (ref 5–40)
BACTERIA URNS QL MICRO: ABNORMAL /HPF
BASOPHILS ABSOLUTE: 0.1 THOU/MM3 (ref 0–0.1)
BASOPHILS NFR BLD AUTO: 1.1 %
BILIRUB CONJ SERPL-MCNC: < 0.2 MG/DL (ref 0–0.3)
BILIRUB SERPL-MCNC: 0.7 MG/DL (ref 0.3–1.2)
BILIRUB UR QL STRIP.AUTO: NEGATIVE
BUN SERPL-MCNC: 15 MG/DL (ref 7–22)
CALCIUM SERPL-MCNC: 9.6 MG/DL (ref 8.5–10.5)
CASTS #/AREA URNS LPF: ABNORMAL /LPF
CASTS 2: ABNORMAL /LPF
CHARACTER UR: CLEAR
CHLORIDE SERPL-SCNC: 106 MEQ/L (ref 98–111)
CO2 SERPL-SCNC: 23 MEQ/L (ref 23–33)
COLOR: YELLOW
CREAT SERPL-MCNC: 0.9 MG/DL (ref 0.4–1.2)
CRYSTALS URNS MICRO: ABNORMAL
DEPRECATED RDW RBC AUTO: 46 FL (ref 35–45)
EOSINOPHIL NFR BLD AUTO: 4.8 %
EOSINOPHILS ABSOLUTE: 0.3 THOU/MM3 (ref 0–0.4)
EPITHELIAL CELLS, UA: ABNORMAL /HPF
ERYTHROCYTE [DISTWIDTH] IN BLOOD BY AUTOMATED COUNT: 13.4 % (ref 11.5–14.5)
GFR SERPL CREATININE-BSD FRML MDRD: 86 ML/MIN/1.73M2
GLUCOSE SERPL-MCNC: 111 MG/DL (ref 70–108)
GLUCOSE UR QL STRIP.AUTO: NEGATIVE MG/DL
HCT VFR BLD AUTO: 46.1 % (ref 42–52)
HGB BLD-MCNC: 15.6 GM/DL (ref 14–18)
HGB UR QL STRIP.AUTO: ABNORMAL
IMM GRANULOCYTES # BLD AUTO: 0.03 THOU/MM3 (ref 0–0.07)
IMM GRANULOCYTES NFR BLD AUTO: 0.4 %
INR PPP: 3.62 (ref 0.85–1.13)
KETONES UR QL STRIP.AUTO: NEGATIVE
LIPASE SERPL-CCNC: 31.8 U/L (ref 5.6–51.3)
LYMPHOCYTES ABSOLUTE: 2 THOU/MM3 (ref 1–4.8)
LYMPHOCYTES NFR BLD AUTO: 27.7 %
MCH RBC QN AUTO: 31.6 PG (ref 26–33)
MCHC RBC AUTO-ENTMCNC: 33.8 GM/DL (ref 32.2–35.5)
MCV RBC AUTO: 93.3 FL (ref 80–94)
MISCELLANEOUS 2: ABNORMAL
MONOCYTES ABSOLUTE: 0.9 THOU/MM3 (ref 0.4–1.3)
MONOCYTES NFR BLD AUTO: 12.4 %
NEUTROPHILS ABSOLUTE: 3.8 THOU/MM3 (ref 1.8–7.7)
NEUTROPHILS NFR BLD AUTO: 53.6 %
NITRITE UR QL STRIP: NEGATIVE
NRBC BLD AUTO-RTO: 0 /100 WBC
OSMOLALITY SERPL CALC.SUM OF ELEC: 286.5 MOSMOL/KG (ref 275–300)
PH UR STRIP.AUTO: 5 [PH] (ref 5–9)
PLATELET # BLD AUTO: 161 THOU/MM3 (ref 130–400)
PMV BLD AUTO: 10.4 FL (ref 9.4–12.4)
POTASSIUM SERPL-SCNC: 3.5 MEQ/L (ref 3.5–5.2)
PROT SERPL-MCNC: 7.3 G/DL (ref 6.1–8)
PROT UR STRIP.AUTO-MCNC: ABNORMAL MG/DL
RBC # BLD AUTO: 4.94 MILL/MM3 (ref 4.7–6.1)
RBC URINE: ABNORMAL /HPF
RENAL EPI CELLS #/AREA URNS HPF: ABNORMAL /[HPF]
SODIUM SERPL-SCNC: 143 MEQ/L (ref 135–145)
SP GR UR REFRACT.AUTO: 1.02 (ref 1–1.03)
UROBILINOGEN, URINE: 1 EU/DL (ref 0–1)
WBC # BLD AUTO: 7.1 THOU/MM3 (ref 4.8–10.8)
WBC #/AREA URNS HPF: ABNORMAL /HPF
WBC #/AREA URNS HPF: ABNORMAL /[HPF]
YEAST LIKE FUNGI URNS QL MICRO: ABNORMAL

## 2024-06-11 PROCEDURE — 82248 BILIRUBIN DIRECT: CPT

## 2024-06-11 PROCEDURE — 51798 US URINE CAPACITY MEASURE: CPT

## 2024-06-11 PROCEDURE — 6360000004 HC RX CONTRAST MEDICATION: Performed by: NURSE PRACTITIONER

## 2024-06-11 PROCEDURE — 96375 TX/PRO/DX INJ NEW DRUG ADDON: CPT

## 2024-06-11 PROCEDURE — 6360000002 HC RX W HCPCS: Performed by: PHYSICIAN ASSISTANT

## 2024-06-11 PROCEDURE — 6370000000 HC RX 637 (ALT 250 FOR IP): Performed by: PHYSICIAN ASSISTANT

## 2024-06-11 PROCEDURE — 6360000002 HC RX W HCPCS: Performed by: NURSE PRACTITIONER

## 2024-06-11 PROCEDURE — 80053 COMPREHEN METABOLIC PANEL: CPT

## 2024-06-11 PROCEDURE — 81001 URINALYSIS AUTO W/SCOPE: CPT

## 2024-06-11 PROCEDURE — 85610 PROTHROMBIN TIME: CPT

## 2024-06-11 PROCEDURE — 99285 EMERGENCY DEPT VISIT HI MDM: CPT

## 2024-06-11 PROCEDURE — 96376 TX/PRO/DX INJ SAME DRUG ADON: CPT

## 2024-06-11 PROCEDURE — 83690 ASSAY OF LIPASE: CPT

## 2024-06-11 PROCEDURE — 85025 COMPLETE CBC W/AUTO DIFF WBC: CPT

## 2024-06-11 PROCEDURE — 96374 THER/PROPH/DIAG INJ IV PUSH: CPT

## 2024-06-11 PROCEDURE — 36415 COLL VENOUS BLD VENIPUNCTURE: CPT

## 2024-06-11 PROCEDURE — 74177 CT ABD & PELVIS W/CONTRAST: CPT

## 2024-06-11 RX ORDER — ONDANSETRON 2 MG/ML
4 INJECTION INTRAMUSCULAR; INTRAVENOUS ONCE
Status: COMPLETED | OUTPATIENT
Start: 2024-06-11 | End: 2024-06-11

## 2024-06-11 RX ORDER — TAMSULOSIN HYDROCHLORIDE 0.4 MG/1
0.4 CAPSULE ORAL
Status: COMPLETED | OUTPATIENT
Start: 2024-06-11 | End: 2024-06-11

## 2024-06-11 RX ORDER — MORPHINE SULFATE 2 MG/ML
2 INJECTION, SOLUTION INTRAMUSCULAR; INTRAVENOUS ONCE
Status: COMPLETED | OUTPATIENT
Start: 2024-06-11 | End: 2024-06-11

## 2024-06-11 RX ORDER — MORPHINE SULFATE 4 MG/ML
4 INJECTION, SOLUTION INTRAMUSCULAR; INTRAVENOUS ONCE
Status: COMPLETED | OUTPATIENT
Start: 2024-06-11 | End: 2024-06-11

## 2024-06-11 RX ORDER — ONDANSETRON 4 MG/1
4 TABLET, ORALLY DISINTEGRATING ORAL EVERY 8 HOURS PRN
Qty: 20 TABLET | Refills: 0 | Status: SHIPPED | OUTPATIENT
Start: 2024-06-11

## 2024-06-11 RX ORDER — OXYCODONE HYDROCHLORIDE AND ACETAMINOPHEN 5; 325 MG/1; MG/1
1 TABLET ORAL EVERY 6 HOURS PRN
Qty: 15 TABLET | Refills: 0 | Status: SHIPPED | OUTPATIENT
Start: 2024-06-11 | End: 2024-06-16

## 2024-06-11 RX ADMIN — IOPAMIDOL 80 ML: 755 INJECTION, SOLUTION INTRAVENOUS at 06:18

## 2024-06-11 RX ADMIN — MORPHINE SULFATE 4 MG: 4 INJECTION, SOLUTION INTRAMUSCULAR; INTRAVENOUS at 07:03

## 2024-06-11 RX ADMIN — MORPHINE SULFATE 2 MG: 2 INJECTION, SOLUTION INTRAMUSCULAR; INTRAVENOUS at 06:07

## 2024-06-11 RX ADMIN — ONDANSETRON 4 MG: 2 INJECTION INTRAMUSCULAR; INTRAVENOUS at 06:07

## 2024-06-11 RX ADMIN — TAMSULOSIN HYDROCHLORIDE 0.4 MG: 0.4 CAPSULE ORAL at 07:38

## 2024-06-11 ASSESSMENT — PAIN DESCRIPTION - DESCRIPTORS
DESCRIPTORS: PRESSURE
DESCRIPTORS: PRESSURE

## 2024-06-11 ASSESSMENT — PAIN SCALES - GENERAL
PAINLEVEL_OUTOF10: 8
PAINLEVEL_OUTOF10: 7

## 2024-06-11 ASSESSMENT — PAIN DESCRIPTION - PAIN TYPE
TYPE: ACUTE PAIN
TYPE: ACUTE PAIN

## 2024-06-11 ASSESSMENT — PAIN - FUNCTIONAL ASSESSMENT
PAIN_FUNCTIONAL_ASSESSMENT: 0-10
PAIN_FUNCTIONAL_ASSESSMENT: 0-10

## 2024-06-11 ASSESSMENT — PAIN DESCRIPTION - ORIENTATION
ORIENTATION: LEFT
ORIENTATION: LEFT

## 2024-06-11 ASSESSMENT — PAIN DESCRIPTION - LOCATION
LOCATION: FLANK
LOCATION: FLANK

## 2024-06-11 NOTE — DISCHARGE INSTRUCTIONS
"Controlled on depakote. Has not had a seizure "in years".  · Continue depakote  · Check level  " Your INR was 3.62 today.  You can call the Coumadin clinic for further directions.    We gave you Flomax today.  Otherwise continue Flomax daily.

## 2024-06-11 NOTE — ED PROVIDER NOTES
Transfer of Care Note:   I have personally performed a face to face diagnostic evaluation on this patient. I have personally performed a face to face diagnostic evaluation on this patient. The patient's initial evaluation and plan have been discussed with the prior provider who initially evaluated the patient. Nursing Notes, Past Medical Hx, Past Surgical Hx, Social Hx, Allergies, and Family Hx were all reviewed.    (Please note that portions of this note were completed with a voice recognition program.  Efforts were made to edit the dictations but occasionally words are mis-transcribed.)    7:40 AM EDT: The patient was evaluated.     Epi Franco is a 79 y.o. male who presents to the Emergency Department for the evaluation of left flank pain.  Symptoms woke him up at 0300.  Feels like prior kidney stone.    Exam:  Vital signs reviewed.  Constitutional: Well-nourished, no distress evident.  Morbidly obese.  HEENT: Normocephalic, normal hearing, EOMI, speech clear.  Neck: Soft supple.  Trachea midline.  Heart: Regular rate and rhythm.  Lungs: Respiratory effort normal  Abdomen: Nondistended; soft, nontender.  Left flank tenderness.  No CVAT  Extremities: Well-perfused, movement normal as observed.  Neuro: No focal deficits noted.  Psych: Normal affect and behavior.    EKG: All EKG's are interpreted by the Emergency Department Physician who either signs or Co-signs this chart in the absence of a cardiologist.  None    RADIOLOGY: non-plain film images(s) such as CT, Ultrasound and MRI are read by the radiologist.  CT ABDOMEN PELVIS W IV CONTRAST Additional Contrast? None   Final Result      6-7 mm stone in the distal left ureter just proximal to the left    ureterovesical junction. There is left hydroureter and mild left    hydronephrosis.      Enlarged prostate gland.      Severe diverticulosis of the descending and proximal sigmoid colon.      Gallstones within the gallbladder lumen. There is no CT evidence

## 2024-06-11 NOTE — ED NOTES
Pt resting in bed after medication administration, states no further needs.  Pt breaths easy and unlabored with no signs of distress noted.  Call light in reach.

## 2024-06-11 NOTE — ED NOTES
MEDICARE WELLNESS VISIT NOTE      History of Present Illness:   Fabby Walsh presents for her Subsequent Annual Medicare Wellness Visit.   She has complaints or concerns which include her recent hospitalization. She underwent stress testing due to chest pain in the left precordial area, but it was unchanged from prior results. She remains on long acting nitrate, beta blocker , aldosterone antagonist and statin therapy with Plavix and ASA. Has not had pain since.   She has chronic fatigue and the question arose that possibly she was unstable due to NEGIN. Her current BMI is elevated again.   She is under a lot of stress helping her  who is in very poor health.   Her BP is at goal today.  She has good HGBA1C, and normal glucose however she informed me she has started her 's Metformin to bring her glucose down. Is not having side effects.   Her lower back has not been much of a problem. It always  Aches. Has mild tingling of her toes intermittently.   She has more trouble with her ankle than anything due to loss of cartilage.   She is willing to catch up on primary screens today. Also looking into Tdap and Shingrix at her local pharmacy.       Patient Care Team:  JUAN JOSE Bloom as PCP - General (Nurse Practitioner)  Oscar Avelar MD as Obstetrics & Gynecology (Obstetrics & Gynecology)  Rio Boyle MD as Neurological Surgery (Neurological Surgery)  Dr. Johnson Serna as Dentist- General Practice  Dr. Brownlee as Ophthalmology (Ophthalmology)  Dr. Estevan Hernandez as Physical Medicine- Rehabilitation (Physical Medicine - Rehabilitation)  Matteo Khan MD as Cardiologist (Cardiology)  JUAN JOSE Cooper as Nurse Practitioner (Cardiology)        Patient Active Problem List    Diagnosis Date Noted   • Precordial pain 02/13/2018     Priority: Medium   • Chest pain 02/13/2018     Priority: Medium   • S/P PTCA (percutaneous transluminal coronary angioplasty) 10/09/2017     Priority:  Bladder scan shows 41 mL urine    Low   • Chronic systolic heart failure (CMS/[facility]) 09/19/2017     Priority: Low     Post MI     • Coronary artery disease involving native coronary artery of native heart without angina pectoris 08/01/2017     Priority: Low     Conclusions 7/24/2017:  1. Late presentation of inferior STEMI which occurred 4 days prior to admission in [place] with residual ischemic pain related to total obstruction of the large dominant right coronary artery with insufficient retrograde collaterals.  2. Mild disease of the left anterior descending and circumflex with normal left main  3. Successful intervention and recanalization and stenting of the right coronary artery as described reestablishing MELISSA grade 3 flow reducing the stenosis to 0%.  4. Left ventriculogram showing moderate inferior hypokinesis with ejection fraction of 40-45%.  5. Left heart catheterization showing no gradient across aortic valve and left ventricular end-diastolic pressure 15 mmHg.    Conclusions 10/2017: cath repeated for chest pain.  1. Diagnostic catheterization as described with mild stenosis at the crux of the previously placed tandem right coronary artery stents of 20% with improved flow to the PDA and posterior LV branch. There was moderate stenosis of the LAD distal to the diagonal branch.  2. Abnormal flow wire determination across the LAD distal to the diagonal. The IFR was abnormal at 0.86  3. Successful coronary intervention of the LAD with placement of a 2.5 x 14 mm resolute stent reducing the stenosis to 0% with MELISSA grade 3 flow before and after.  4. Left ventriculogram showing mild reduction of ejection fraction 45% with the inferior basal and mid inferior wall hypokinesis.     • Impaired fasting glucose      Priority: Low   • Peripheral neuropathy      Priority: Low   • Spinal stenosis of lumbar region at multiple levels      Priority: Low     L3-4, L4-5, L5S1     • BPPV (benign paroxysmal positional vertigo)      Priority: Low   • DJD  (degenerative joint disease)      Priority: Low     spine and lower extremities     • Asthma      Priority: Low   • Anxiety      Priority: Low   • Hyperlipidemia, mixed 2014     Priority: Low   • Obesity (BMI 35.0-39.9 without comorbidity) 2014     Priority: Low         Past Medical History:   Diagnosis Date   • Anxiety    • BPPV (benign paroxysmal positional vertigo)    • Bronchitis    • Chronic systolic heart failure (CMS/[facility]) 2017    10/2017 Mild LVEF 45% on October Cardiac catheterization.    • Coronary artery disease    • Diverticulosis     one bout of diverticulitis in the past    • DJD (degenerative joint disease)     spine and lower extremities   • Hyperlipemia    • Impaired fasting glucose    • Myalgia and myositis, unspecified    • Myocardial infarction (CMS/[facility])     Inferior MI   • Obesity    • Osteoporosis    • Peripheral neuropathy    • PONV (postoperative nausea and vomiting)    • Prickly heat    • S/P PTCA (percutaneous transluminal coronary angioplasty) 10/09/2017    LAD, Stent to RCA 2017   • Serous cystadenoma, right 2015   • Spinal stenosis of lumbar region at multiple levels     L3-4, L4-5, L5S1   • Syncope, cardiogenic          Past Surgical History:   Procedure Laterality Date   • Cardiac catherization  10/2017    2017 and 2017 with PTCA and stents   • Colonoscopy  10/22/2008   • Colonoscopy w biopsy  5/7/15   • Esophagogastroduodenoscopy  5/7/15   • Fracture surgery     • Hysterectomy     • Ovarian cyst removal      right   • Removal gallbladder      Cholecystectomy (gallbladder)   • Robotic assisted hysterectomy  7/6/15    BSO, SHAN   • Skin cancer excision      excision bcc   • Tonsillectomy     • Tubal ligation           Social History     Tobacco Use   • Smoking status: Former Smoker     Packs/day: 1.00     Years: 20.00     Pack years: 20.00     Types: Cigarettes     Last attempt to quit: 1990     Years since quittin.5   • Smokeless tobacco:  Never Used   Substance Use Topics   • Alcohol use: Yes     Alcohol/week: 1.8 oz     Types: 3 Glasses of wine per week     Comment: Occasional / Social   • Drug use: No     Drug use:    Drug Use:    No              Family History   Problem Relation Age of Onset   • Cancer Mother         melanoma   • Stroke Mother         TIA   • Heart disease Father    • Cancer Sister         skin   • Diabetes Brother    • Heart disease Brother         CABGx4, valve surgery   • Psychiatric Son    • Depression Son        Current Outpatient Medications   Medication Sig Dispense Refill   • atorvastatin (LIPITOR) 40 MG tablet Take 1 tablet by mouth at bedtime. 90 tablet 3   • metFORMIN (GLUCOPHAGE-XR) 500 MG 24 hr tablet Take 1 tablet by mouth daily (with breakfast). 90 tablet 3   • isosorbide mononitrate (IMDUR) 30 MG 24 hr tablet Take 1 tablet by mouth daily. 90 tablet 3   • nitroGLYcerin (NITROSTAT) 0.4 MG sublingual tablet Place 1 tablet under the tongue every 5 minutes as needed for Chest pain (if after 3 doses chest pain continues,seek medical attention.). 25 tablet 11   • gabapentin (NEURONTIN) 300 MG capsule Take 1 capsule by mouth at bedtime. 90 capsule 3   • losartan (COZAAR) 25 MG tablet TAKE 1 TABLET DAILY 90 tablet 3   • metoPROLOL succinate (TOPROL-XL) 25 MG 24 hr tablet Take 1 tablet by mouth at bedtime. 90 tablet 3   • spironolactone (ALDACTONE) 25 MG tablet TAKE 1/2 TABLET DAILY 45 tablet 3   • venlafaxine XR (EFFEXOR XR) 75 MG 24 hr capsule Take 1 capsule by mouth daily. 90 capsule 3   • clopidogrel (PLAVIX) 75 MG tablet Take 1 tablet by mouth daily. 90 tablet 3   • nystatin (MYCOSTATIN) 588660 UNIT/GM cream Apply 1 application topically 2 times daily as needed (rash).     • aspirin 81 MG EC tablet Take 1 tablet by mouth daily. 90 tablet 3   • meclizine HCl (ANTIVERT) 25 MG tablet Take 1 tablet by mouth 3 times daily as needed for Dizziness. 30 tablet 1     No current facility-administered medications for this visit.          The following items on the Medicare Health Risk Assessment were found to be positive  3.) During the past 4 weeks, how would you rate your health?:  Fair     6 b.) How many servings of High Fiber / Whole Grain Foods to you have each day ( 1 serving = 1 cup cold cereal, 1/2 cup cooked cereal, 1 slice bread):  None     14.) During the past 4 weeks, was someone available to help if you needed and wanted help?:  Yes, a little     15.) How confident are you that you can control and manage most of your health problems?:  Somewhat confident         Vision and hearing screens: not performed    Advance Directive:   The patient has the following documents:  Power of  for Health Care    Cognitive Assessment: no evidence of cognitive dysfunction by direct observation    Recent PHQ 2/9 Score    PHQ 2:  Date Adult PHQ 2 Score   7/26/2019 0       PHQ 9:       DEPRESSION ASSESSMENT/PLAN:  Depression screening is negative no further plan needed.     Body mass index is 41.44 kg/m².    BMI ASSESSMENT/PLAN:  start Metformin to hopefully lose some weight. NEGIN referral.      Needed Screening/Treatment:   Annual mammogram , Bone density and Immunizations reviewed and patient needs: DT and Shingrix.  Needed follow up:  None    ------------------------------------------------------------------------    REVIEW OF SYSTEMS:   Eye Problem(s):negative  ENT Problem(s):tinnitus  Cardiovascular problem(s):negative, chest pain, irregular heart beat, lower extremity edema and palpitations  Respiratory problem(s): +RONDON  Gastro-intestinal problem(s):negative GI  Genito-urinary problem(s):negative  Musculoskeletal problem(s):back pain and chronic ankle pain  Integumentary problem(s):negative  Neurological problem(s):negative  Psychiatric problem(s):anxiety, depression and chronic problems dealing with  and his non compliance with his health management. Medicated with SNRI  Endocrine problem(s):impaired glucose without symptoms.    Hematologic and/or Lymphatic problem(s):negative      PHYSICAL EXAM:    Visit Vitals  /78 (BP Location: [facility], Patient Position: Sitting, Cuff Size: Large Adult)   Pulse 72   Ht 5' 5.5\" (1.664 m)   Wt 114.7 kg   BMI 41.44 kg/m²      GENERAL APPEARANCE: Appears stated age, is in no apparent distress, is well developed and well nourished.  SKIN: Normal color for ethnicity, normal texture, good turgor, no skin rashes, no atypical-appearing skin lesions, no bruises.  LYMPH NODES: No cervical, supraclavicular, axillary or inguinal adenopathy.   HEAD: Normocephalic.  EYES: Pupils are equal and reactive to light and accommodation, extraocular movements are full, sclerae and conjunctivae are normal, lids and lashes are normal.  EARS: Pinnae and external ears are normal bilaterally, external auditory canals are normal, tympanic membranes are normal,  there is no tragal tenderness, auditory acuity is grossly intact.  See above.   NOSE: External nose is normal to inspection, no septal deviation.  MOUTH/THROAT: Tongue is midline and appears normal, oropharynx appears normal, soft palate and uvula are normal, oral mucosa is normal.  Dentition good repair.  NECK: Neck is supple, no thyromegaly, trachea is midline.  CHEST: Configuration normal, nontender to palpation, respiratory effort is not labored.  BREASTS: Breasts symmetric, skin shows no dimpling or induration, no nipple changes, no nipple discharge, no palpable nodules, no breast discomfort.  LUNGS: Lungs are clear to auscultation with normal inspiratory/expiratory sounds, no rales, no rhonchi, no wheezes.  CARDIOVASCULAR: Normal point of maximal impulse, normal rate and rhythm, no palpable heaves or thrills, S1 and S2 normal, no S3 or S4, no murmurs, no extra heart sounds.   No carotid or abdominal bruits.  ABDOMEN: Abdomen is soft, normal active bowel sounds, nontender, without masses, without hepatomegaly or splenomegaly, no pulsatile masses.  BACK: Inspection  shows normal curvature, no discomfort to palpation of the midline, no costovertebral angle discomfort to palpation.  EXTREMITIES: No clubbing, no cyanosis, no edema, normal muscle tone and development bilaterally.  NEUROLOGIC:  Alert, appropriate, oriented x 3.  Speech fluent. Cranial nerves 2-12 intact, motor strength intact and symmetric,   PSYCHIATRIC:  Affect appropriate, insight good into her ongoing problems and difficulty taking care of herself.  mood good.    (Z00.00) Medicare annual wellness visit, subsequent  (primary encounter diagnosis)  Comment:  reviewed with patient and agrees to update. Will check into vaccines at local pharmacy.   Plan: MWV 1 year.     (E78.2) Hyperlipidemia, mixed  Comment:   The ASCVD Risk score (Chavezmadhu FALL Jr., et al., 2013) failed to calculate for the following reasons:    The patient has a prior MI or stroke diagnosis    Plan: atorvastatin (LIPITOR) 40 MG tablet,         OFFICE/OUTPT VISIT EST LEVEL IV        Continue high dose statin therapy due to history.     (R73.01) Impaired fasting glucose  Comment: placed herself on Metformin with good reduction in fasting glucose. Will leave her on it, hopefully to help with weight reduction as well.   Plan: metFORMIN (GLUCOPHAGE-XR) 500 MG 24 hr tablet, 1 po daily.         OFFICE/OUTPT VISIT EST LEVEL IV        Recheck Glucose and HGBA1C 6 months. Will send glucometer and supplies for her to check once daily randomly.     (F41.9) Anxiety  Comment: on SNRI  Plan: OFFICE/OUTPT VISIT EST LEVEL IV        Continue Venlafaxine.     (I50.22) Chronic systolic heart failure (CMS/[facility])  Comment: on multiple medical therapy without evidence of decompensation.   Plan: OFFICE/OUTPT VISIT EST LEVEL IV        Continue to follow with cardiology.     (I25.10) Coronary artery disease involving native coronary artery of native heart without angina pectoris  Comment: recent stress testing was negative for change.   Plan: OFFICE/OUTPT VISIT EST LEVEL IV         Follow with cardiology.     (E66.9) Obesity (BMI 35.0-39.9 without comorbidity)  Comment: weight gain.   Plan: OFFICE/OUTPT VISIT EST LEVEL IV        Discussed strategies to lose weight and also will get NEGIN evaluation.     (Z12.31) Encounter for screening mammogram for malignant neoplasm of breast  Comment: due  Plan: MAMMO SCREENING BILATERAL            (Z78.0) Postmenopausal status (age-related) (natural)  Comment: due. Last done 2014, showed osteopenia  Plan: BD DEXA AXIAL SKELETON            (R53.82) Chronic fatigue  Comment: along with CAD, morbid obesity. Will assess for NEGIN  Plan: SERVICE TO PULMONARY MEDICINE             Return in about 6 months (around 1/26/2020) for 6 month with fasting lab prior to visit.    See Patient Instructions section.   Return in about 1 year (around 7/26/2020) for Medicare Wellness Visit.

## 2024-06-11 NOTE — ED NOTES
Pt resting in bed with call light in reach, states no further needs at this time.  Pt breaths easy and unlabored, no acute signs of distress noted.

## 2024-06-11 NOTE — ED NOTES
Pt to ED for complaint of left sided flank pain rated 8/10 at this time and \"pressure\".  Pt states onset at 0300 waking him from sleep, has not urinated since onset.  Pt states history of kidney stones.  Pt states no further complaints at this time, breaths easy and unlabored with no signs of acute distress noted.

## 2024-06-11 NOTE — ED NOTES
ED nurse-to-nurse bedside report    Chief Complaint   Patient presents with    Flank Pain     Left side       LOC: alert and orientated to name, place, date  Vital signs   Vitals:    06/11/24 0454 06/11/24 0610 06/11/24 0705   BP: (!) 142/77 125/74 116/79   Pulse: 78  77   Resp: 16 16 16   Temp: 98.3 °F (36.8 °C)     TempSrc: Oral     SpO2: 97% 92% 99%   Weight: 99.8 kg (220 lb)     Height: 1.753 m (5' 9\")        Pain:    Pain Interventions: See MAR   Pain Goal: 4/10  Oxygen: No    Current needs required None   Telemetry: Yes  LDAs:   Peripheral IV 06/11/24 Left Antecubital (Active)   Site Assessment Clean, dry & intact 06/11/24 0507   Line Status Blood return noted;Flushed;Normal saline locked;Specimen collected 06/11/24 0507   Phlebitis Assessment No symptoms 06/11/24 0507   Infiltration Assessment 0 06/11/24 0507   Dressing Status New dressing applied 06/11/24 0507   Dressing Type Transparent 06/11/24 0507     Continuous Infusions:   Mobility: Requires assistance * 1  Tovar Fall Risk Score:       2/22/2024     9:16 AM 11/9/2023    12:07 PM 8/30/2022     9:14 AM 8/3/2021     9:46 AM 7/21/2020     8:50 AM 3/20/2020     8:47 AM 12/27/2018    11:58 AM   Fall Risk   2 or more falls in past year? no no no no no no no   Fall with injury in past year? no no no no no no no     Fall Interventions: Side rails up, hourly rounding, call light in reach   Report given to: Merary MONTERO

## 2024-06-13 ENCOUNTER — TELEPHONE (OUTPATIENT)
Dept: UROLOGY | Age: 79
End: 2024-06-13

## 2024-06-13 ENCOUNTER — OFFICE VISIT (OUTPATIENT)
Dept: UROLOGY | Age: 79
End: 2024-06-13

## 2024-06-13 VITALS — TEMPERATURE: 97.3 F | HEIGHT: 69 IN | BODY MASS INDEX: 33.1 KG/M2 | WEIGHT: 223.5 LBS

## 2024-06-13 DIAGNOSIS — N13.8 BPH WITH OBSTRUCTION/LOWER URINARY TRACT SYMPTOMS: ICD-10-CM

## 2024-06-13 DIAGNOSIS — N13.2 HYDRONEPHROSIS CONCURRENT WITH AND DUE TO CALCULI OF KIDNEY AND URETER: ICD-10-CM

## 2024-06-13 DIAGNOSIS — N40.1 BPH WITH OBSTRUCTION/LOWER URINARY TRACT SYMPTOMS: ICD-10-CM

## 2024-06-13 DIAGNOSIS — N20.1 LEFT URETERAL CALCULUS: Primary | ICD-10-CM

## 2024-06-13 DIAGNOSIS — R97.20 ELEVATED PSA: ICD-10-CM

## 2024-06-13 RX ORDER — TAMSULOSIN HYDROCHLORIDE 0.4 MG/1
0.4 CAPSULE ORAL DAILY
Qty: 30 CAPSULE | Refills: 0 | Status: SHIPPED | OUTPATIENT
Start: 2024-06-13

## 2024-06-13 NOTE — PATIENT INSTRUCTIONS
Kidney stone prevention  -Increase water to 60 to 80 ounces a day  -Add mai to water  -Limit salt intake  -Low oxalate diet  -Limit animal protein to 8 ounces a day       Start flomax to facilitate the passage of the stone  Take Zofran as needed for nausea   Increase fluids > 60oz daily.   Strain all urine. Follow-up in 3 weeks  KUB and US.in 2 weeks  Discussed warning signs or fevers, chills, increased pain or just not feeling well need to go ER

## 2024-06-13 NOTE — PROGRESS NOTES
culture as outpatient  PSA elevated.  Pt was given doxy for 2 weeks by PCP and has orders to repeat PSA.  Discussed warning signs or fevers, chills, increased pain or just not feeling well need to go ER          Prescriptions Ordered:  Orders Placed This Encounter   Medications    tamsulosin (FLOMAX) 0.4 MG capsule     Sig: Take 1 capsule by mouth daily     Dispense:  30 capsule     Refill:  0      Orders Placed:  Orders Placed This Encounter   Procedures    Culture, Urine     Order Specific Question:   Specify (ex-cath, midstream, cysto, etc)?     Answer:   midstream    XR ABDOMEN (KUB) (SINGLE AP VIEW)     Standing Status:   Future     Standing Expiration Date:   6/13/2025     Order Specific Question:   Reason for exam:     Answer:   kidney stones    US RENAL COMPLETE           Standing Status:   Future     Standing Expiration Date:   6/13/2025            Return in about 3 weeks (around 7/4/2024) for left ureteral stone.        Margret Foley, APRN - CNP

## 2024-06-13 NOTE — TELEPHONE ENCOUNTER
Patient scheduled for US RENAL COMPLETE  at [facility] on 7/8/24.  Arrival of 815 AM for a 830 AM scan time.  Order mailed with instructions or given to the patient in the office

## 2024-06-14 ENCOUNTER — TELEPHONE (OUTPATIENT)
Dept: UROLOGY | Age: 79
End: 2024-06-14

## 2024-06-14 ASSESSMENT — ENCOUNTER SYMPTOMS
SHORTNESS OF BREATH: 0
COUGH: 0
ABDOMINAL PAIN: 0

## 2024-06-15 LAB — BACTERIA UR CULT: NORMAL

## 2024-06-18 ENCOUNTER — TELEPHONE (OUTPATIENT)
Dept: UROLOGY | Age: 79
End: 2024-06-18

## 2024-06-19 NOTE — ED PROVIDER NOTES
[facility] EMERGENCY DEPT      EMERGENCY MEDICINE     Pt Name: Epi Franco  MRN: [id number]  Birthdate 1945  Date of evaluation: 6/11/2024  Provider: MELI Jones - CNP    CHIEF COMPLAINT       Chief Complaint   Patient presents with    Flank Pain     Left side      HISTORY OF PRESENT ILLNESS   Epi Franco is a pleasant 79 y.o. male who presents to the emergency department from {location:10[address]} with c/o ***      History is obtained from:  {HISTORY SOURCE:111616416}  PASTMEDICAL HISTORY     Past Medical History:   Diagnosis Date    CAD (coronary artery disease) 01/2006    cath with stent lad    Chronic back pain 2013      epidural block    Colon polyps 10/2005    Diverticulosis of colon     DVT of proximal leg (deep vein thrombosis) ([facility]) 12/04 2013    left     right  in 2013    DVT of proximal leg (deep vein thrombosis) ([facility])     left     GERD (gastroesophageal reflux disease)     Kidney stone on left side 12/2020    left  side  in  2009  also    Pulmonary embolus ([facility]) 6-2013 and  1-2015     had   right  leg  dvt    S/P cardiac cath 2014 at [facility]- patent LAD stent done 2006 and 40 to 50% lesions in other coronaries 04/12/2019    S/P cardiac cath 4/17/19- large all ectatic vessel, lm-p, lad prox 40%, mid 50%, distal 40 distal stent -patent, lcx prox 40%, rca prox 30%, dist 40%, edp 11, ef 50%- med  RX 04/17/2019    SOB (shortness of breath) on exertion 06/08/2020    Unspecified sleep apnea 2013    cpap       Patient Active Problem List   Diagnosis Code    CAD (coronary artery disease) I25.10    Diverticulosis of colon K57.30    Hyperlipidemia E78.5    Obesity E66.9    GERD (gastroesophageal reflux disease) K21.9    Colon polyps K63.5    Sleep apnea G47.30    Chronic back pain M54.9, G89.29    S/P cardiac cath 2014 at [facility]- patent LAD stent done 2006 and 40 to 50% lesions in other coronaries Z98.890    S/P angioplasty with stent of the LAD 2014 Z95.820    Essential hypertension I10

## 2024-07-03 ENCOUNTER — HOSPITAL ENCOUNTER (OUTPATIENT)
Age: 79
Discharge: HOME | End: 2024-07-03
Payer: MEDICARE

## 2024-07-03 ENCOUNTER — HOSPITAL ENCOUNTER (OUTPATIENT)
Dept: GENERAL RADIOLOGY | Age: 79
Discharge: HOME | End: 2024-07-03
Payer: MEDICARE

## 2024-07-03 DIAGNOSIS — N20.1 LEFT URETERAL CALCULUS: ICD-10-CM

## 2024-07-03 DIAGNOSIS — N13.2 HYDRONEPHROSIS CONCURRENT WITH AND DUE TO CALCULI OF KIDNEY AND URETER: ICD-10-CM

## 2024-07-03 PROCEDURE — 74018 RADEX ABDOMEN 1 VIEW: CPT

## 2024-07-08 ENCOUNTER — HOSPITAL ENCOUNTER (OUTPATIENT)
Dept: ULTRASOUND IMAGING | Age: 79
Discharge: HOME | End: 2024-07-08
Payer: MEDICARE

## 2024-07-08 DIAGNOSIS — N13.2 HYDRONEPHROSIS CONCURRENT WITH AND DUE TO CALCULI OF KIDNEY AND URETER: ICD-10-CM

## 2024-07-08 DIAGNOSIS — N20.1 LEFT URETERAL CALCULUS: ICD-10-CM

## 2024-07-08 PROCEDURE — 76770 US EXAM ABDO BACK WALL COMP: CPT

## 2024-07-10 ENCOUNTER — OFFICE VISIT (OUTPATIENT)
Dept: UROLOGY | Age: 79
End: 2024-07-10
Payer: MEDICARE

## 2024-07-10 VITALS — HEIGHT: 69 IN | BODY MASS INDEX: 33.3 KG/M2 | WEIGHT: 224.8 LBS | RESPIRATION RATE: 16 BRPM

## 2024-07-10 DIAGNOSIS — N40.1 BPH WITH OBSTRUCTION/LOWER URINARY TRACT SYMPTOMS: ICD-10-CM

## 2024-07-10 DIAGNOSIS — R97.20 ELEVATED PSA: ICD-10-CM

## 2024-07-10 DIAGNOSIS — N20.1 LEFT URETERAL CALCULUS: Primary | ICD-10-CM

## 2024-07-10 DIAGNOSIS — N13.2 HYDRONEPHROSIS CONCURRENT WITH AND DUE TO CALCULI OF KIDNEY AND URETER: ICD-10-CM

## 2024-07-10 DIAGNOSIS — N13.8 BPH WITH OBSTRUCTION/LOWER URINARY TRACT SYMPTOMS: ICD-10-CM

## 2024-07-10 LAB — POST VOID RESIDUAL (PVR): 57 ML

## 2024-07-10 PROCEDURE — 99214 OFFICE O/P EST MOD 30 MIN: CPT | Performed by: NURSE PRACTITIONER

## 2024-07-10 PROCEDURE — 1123F ACP DISCUSS/DSCN MKR DOCD: CPT | Performed by: NURSE PRACTITIONER

## 2024-07-10 PROCEDURE — 51798 US URINE CAPACITY MEASURE: CPT | Performed by: NURSE PRACTITIONER

## 2024-07-10 ASSESSMENT — ENCOUNTER SYMPTOMS
COUGH: 0
ABDOMINAL PAIN: 0
SHORTNESS OF BREATH: 0

## 2024-07-10 NOTE — PROGRESS NOTES
[facility]  [facility]  [address]  [address]  [facility] [address]  Dept: [phone]  Loc: [phone]    Visit Date: 7/10/2024        HPI:   Epi is a 79 year old male seen for kidney stones, elevated PSA, BPH.     Seen in ER 6/11/24 for left flank pain.  CT showed 6-7mm stone in distal left ureter just prox to Left UVJ.  There is left hydroureter and mild left hydronephrosis. The urinary bladder is unremarkable. The prostate gland is enlarged measuring 4.4 x 5.4 x 5.7 cm.  Mild fusiform dilatation of the infrarenal abdominal aorta measuring 2.5 cm. 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  Severe diverticulosis of the descending and proximal sigmoid colon   WBC 7.1, cre 0.9 (baseline). UA large blood, small leuk, WBC 5-9, RBC 25-50.     Seen 6/13/24.  Ureteral stone discussed. Trial of passage discussed. Flomax started.   Noted PSA was elevated and pt was given 2 weeks doxy by PCP and PSA was to be repeated. Urine cx negative.     DAPHNE 6/13/24 7mm simple appearing avascular cyst on right.  NO hydro.  Enlarged prostate.     KUB 7/3/24 constipation.  No kidney /ureter stones seen. Partially calcified splenic artery aneurysm.      Seen today 7/10/24. No pain since seen last. Nocturia 1/night.  No hematuria, no dysuria.   No abdominal pain or flank pain. Overall feels well.  No problems urinating now or previously.      Current Outpatient Medications   Medication Sig Dispense Refill    tamsulosin (FLOMAX) 0.4 MG capsule Take 1 capsule by mouth daily 30 capsule 0    warfarin (JANTOVEN) 5 MG tablet TAKE by mouth AS DIRECTED BY COUMADIN CLINIC 90 tablet 0    metoprolol succinate (TOPROL XL) 25 MG extended release tablet TAKE 1/2 TABLET BY MOUTH EVERY DAY 45 tablet 1    potassium chloride (KLOR-CON M) 20 MEQ extended release tablet TAKE 1 TABLET BY MOUTH EVERY DAY 90 tablet 1

## 2024-07-10 NOTE — PATIENT INSTRUCTIONS
Stop flomax    X-ray in 6 months    If would like to complete litholink let office know.   Litholink 24 hour urine collection..  This test analyzes the elements present in your urine that may increase your risk for kidney stone formation.  It allows your medical provider to pinpoint what specific dietary changes or medicine changes can be made to prevent future stones.  Call the number listed on your order and the company will give you further instructions on how to receive, complete, and mail your kit.      Kidney stone prevention  -Increase water to 60 to 80 ounces a day  -Add mai to water  -Limit salt intake  -Low oxalate diet  -Limit animal protein to 8 ounces a day       Follow up in 6 months for kidney stones.  IF PSA does not improve with antibiotic previously given pt needs to call for follow up for elevated PSA sooner this this.

## 2024-07-11 ENCOUNTER — OFFICE VISIT (OUTPATIENT)
Dept: CARDIOLOGY CLINIC | Age: 79
End: 2024-07-11
Payer: MEDICARE

## 2024-07-11 VITALS
DIASTOLIC BLOOD PRESSURE: 64 MMHG | WEIGHT: 221.6 LBS | HEART RATE: 61 BPM | SYSTOLIC BLOOD PRESSURE: 123 MMHG | HEIGHT: 69 IN | BODY MASS INDEX: 32.82 KG/M2

## 2024-07-11 DIAGNOSIS — R00.1 SINUS BRADYCARDIA: ICD-10-CM

## 2024-07-11 DIAGNOSIS — I35.0 MODERATE AORTIC STENOSIS: ICD-10-CM

## 2024-07-11 DIAGNOSIS — Z95.820 S/P ANGIOPLASTY WITH STENT: ICD-10-CM

## 2024-07-11 DIAGNOSIS — I10 ESSENTIAL HYPERTENSION: ICD-10-CM

## 2024-07-11 DIAGNOSIS — Z98.890 S/P CARDIAC CATH: ICD-10-CM

## 2024-07-11 DIAGNOSIS — I25.10 CORONARY ARTERY DISEASE INVOLVING NATIVE CORONARY ARTERY OF NATIVE HEART WITHOUT ANGINA PECTORIS: Primary | ICD-10-CM

## 2024-07-11 DIAGNOSIS — I42.0 DILATED CARDIOMYOPATHY (HCC): ICD-10-CM

## 2024-07-11 DIAGNOSIS — I50.42 CHRONIC COMBINED SYSTOLIC AND DIASTOLIC CONGESTIVE HEART FAILURE (HCC): ICD-10-CM

## 2024-07-11 DIAGNOSIS — E78.00 PURE HYPERCHOLESTEROLEMIA: Chronic | ICD-10-CM

## 2024-07-11 PROBLEM — I50.22 CHRONIC SYSTOLIC (CONGESTIVE) HEART FAILURE (HCC): Status: RESOLVED | Noted: 2023-02-21 | Resolved: 2024-07-11

## 2024-07-11 PROBLEM — I25.119 ATHEROSCLEROTIC HEART DISEASE OF NATIVE CORONARY ARTERY WITH UNSPECIFIED ANGINA PECTORIS (HCC): Status: RESOLVED | Noted: 2024-05-28 | Resolved: 2024-07-11

## 2024-07-11 PROCEDURE — 3074F SYST BP LT 130 MM HG: CPT | Performed by: INTERNAL MEDICINE

## 2024-07-11 PROCEDURE — 3078F DIAST BP <80 MM HG: CPT | Performed by: INTERNAL MEDICINE

## 2024-07-11 PROCEDURE — 99214 OFFICE O/P EST MOD 30 MIN: CPT | Performed by: INTERNAL MEDICINE

## 2024-07-11 PROCEDURE — 1123F ACP DISCUSS/DSCN MKR DOCD: CPT | Performed by: INTERNAL MEDICINE

## 2024-07-11 NOTE — PROGRESS NOTES
breathing pattern  hX OF lad STENT 2006  The cath report from [facility] reviewed and stable done 2014  Echo WNL  Cath 2019- no obstructive ds    Hx of Sinus Bradycardia  Rate 58  On  toprol xl 12.5 po qd  Holter 48 hrs while on toprol xl 12.5 po qd-  stable  No  need for pacer now     Hyperlipidemia: on statins, followed periodically. Patient need periodic lipid and liver profile.  Lipid panel and liver function test before next appointment asap    Congestive heart failure: no evidence of fluid overload today, no recent hospitalization for CHF   hx of Leg edema +2- now trace  Cont  aldactazide 25/25 1/2 tab po qd  BMP and Mg stable  Need f/U ECHO  FOR STABILITY  EF dropped to 45% from 50%    Cardiomyopathy: improving, no CHF symptoms, no change in clinical condition. Will need periodic echocardiograms depending on symptoms.  EF 45% mild drop   No angina or angina equivalent  Med Rx  Low normal BP no ACEI/ ARbs    Moderate aortic stenosis  Echo  F/u scheduled    Chronic low K intake prior to aldactazide   Cont kcl  K 4.1    Lab  prior to next visit    The pat is Stable and doing well    D/w the pat the plan of care in detail    Discussed use, benefit, and side effects of prescribed medications. All patient questions answered. Pt voiced understanding. Instructed to continue current medications, diet and exercise. Continue risk factor modification and medical management. Patient agreed with treatment plan. Follow up as directed.      The patient is advised to attempt to improve diet and exercise patterns to aid in medical management of this problem.  Advised more plant based nutrition/meditarrean diet   Advised patient to call office or seek immediate medical attention if there is any new onset of  any chest pain, sob, palpitations, lightheadedness, dizziness, orthopnea, PND or pedal edema.   All medication side effects were discussed in details.      RTC in 6 month    Patricia Murguia MD,MD,FACC

## 2024-07-17 ENCOUNTER — HOSPITAL ENCOUNTER (OUTPATIENT)
Age: 79
Discharge: HOME | End: 2024-07-19
Attending: INTERNAL MEDICINE
Payer: MEDICARE

## 2024-07-17 VITALS — DIASTOLIC BLOOD PRESSURE: 64 MMHG | SYSTOLIC BLOOD PRESSURE: 123 MMHG

## 2024-07-17 DIAGNOSIS — I10 ESSENTIAL HYPERTENSION: ICD-10-CM

## 2024-07-17 DIAGNOSIS — Z98.890 S/P CARDIAC CATH: ICD-10-CM

## 2024-07-17 DIAGNOSIS — I42.0 DILATED CARDIOMYOPATHY (HCC): ICD-10-CM

## 2024-07-17 DIAGNOSIS — E78.00 PURE HYPERCHOLESTEROLEMIA: Chronic | ICD-10-CM

## 2024-07-17 DIAGNOSIS — I35.0 MODERATE AORTIC STENOSIS: ICD-10-CM

## 2024-07-17 DIAGNOSIS — Z95.820 S/P ANGIOPLASTY WITH STENT: ICD-10-CM

## 2024-07-17 DIAGNOSIS — R00.1 SINUS BRADYCARDIA: ICD-10-CM

## 2024-07-17 DIAGNOSIS — I50.42 CHRONIC COMBINED SYSTOLIC AND DIASTOLIC CONGESTIVE HEART FAILURE (HCC): ICD-10-CM

## 2024-07-17 DIAGNOSIS — I25.10 CORONARY ARTERY DISEASE INVOLVING NATIVE CORONARY ARTERY OF NATIVE HEART WITHOUT ANGINA PECTORIS: ICD-10-CM

## 2024-07-17 LAB
ECHO AO ASC DIAM: 3.9 CM
ECHO AV AREA PEAK VELOCITY: 1.9 CM2
ECHO AV AREA VTI: 1.8 CM2
ECHO AV CUSP MM: 1.3 CM
ECHO AV MEAN GRADIENT: 7 MMHG
ECHO AV MEAN VELOCITY: 1.3 M/S
ECHO AV PEAK GRADIENT: 14 MMHG
ECHO AV PEAK VELOCITY: 1.8 M/S
ECHO AV VELOCITY RATIO: 0.56
ECHO AV VTI: 36.6 CM
ECHO LA AREA 2C: 20.5 CM2
ECHO LA AREA 4C: 21.2 CM2
ECHO LA DIAMETER: 3.5 CM
ECHO LA MAJOR AXIS: 6 CM
ECHO LA MINOR AXIS: 5.8 CM
ECHO LA VOL BP: 57 ML (ref 18–58)
ECHO LA VOL MOD A2C: 57 ML (ref 18–58)
ECHO LA VOL MOD A4C: 56 ML (ref 18–58)
ECHO LV E' LATERAL VELOCITY: 8 CM/S
ECHO LV E' SEPTAL VELOCITY: 5 CM/S
ECHO LV EDV A2C: 100 ML
ECHO LV EDV A4C: 147 ML
ECHO LV EJECTION FRACTION A2C: 56 %
ECHO LV EJECTION FRACTION A4C: 58 %
ECHO LV EJECTION FRACTION BIPLANE: 59 % (ref 55–100)
ECHO LV ESV A2C: 44 ML
ECHO LV ESV A4C: 62 ML
ECHO LV FRACTIONAL SHORTENING: 36 % (ref 28–44)
ECHO LV INTERNAL DIMENSION DIASTOLIC: 5.9 CM (ref 4.2–5.9)
ECHO LV INTERNAL DIMENSION SYSTOLIC: 3.8 CM
ECHO LV ISOVOLUMETRIC RELAXATION TIME (IVRT): 95 MS
ECHO LV IVSD: 0.9 CM (ref 0.6–1)
ECHO LV MASS 2D: 209.6 G (ref 88–224)
ECHO LV POSTERIOR WALL DIASTOLIC: 0.9 CM (ref 0.6–1)
ECHO LV RELATIVE WALL THICKNESS RATIO: 0.31
ECHO LVOT AREA: 3.5 CM2
ECHO LVOT AV VTI INDEX: 0.53
ECHO LVOT DIAM: 2.1 CM
ECHO LVOT MEAN GRADIENT: 2 MMHG
ECHO LVOT PEAK GRADIENT: 4 MMHG
ECHO LVOT PEAK VELOCITY: 1 M/S
ECHO LVOT SV: 67.5 ML
ECHO LVOT VTI: 19.5 CM
ECHO MV A VELOCITY: 0.86 M/S
ECHO MV E DECELERATION TIME (DT): 237 MS
ECHO MV E VELOCITY: 0.7 M/S
ECHO MV E/A RATIO: 0.81
ECHO MV E/E' LATERAL: 8.75
ECHO MV E/E' RATIO (AVERAGED): 11.38
ECHO MV E/E' SEPTAL: 14
ECHO PULMONARY ARTERY END DIASTOLIC PRESSURE: 5 MMHG
ECHO PV MAX VELOCITY: 0.6 M/S
ECHO PV PEAK GRADIENT: 1 MMHG
ECHO PV REGURGITANT MAX VELOCITY: 1.1 M/S
ECHO RV INTERNAL DIMENSION: 3.2 CM
ECHO RV TAPSE: 1.4 CM (ref 1.7–?)
ECHO TV E WAVE: 0.7 M/S
ECHO TV REGURGITANT MAX VELOCITY: 2.36 M/S
ECHO TV REGURGITANT PEAK GRADIENT: 22 MMHG
LEFT VENTRICULAR EJECTION FRACTION MODE: NORMAL
LV EF: 55 %

## 2024-07-17 PROCEDURE — 2580000003 HC RX 258: Performed by: INTERNAL MEDICINE

## 2024-07-17 PROCEDURE — 93306 TTE W/DOPPLER COMPLETE: CPT | Performed by: INTERNAL MEDICINE

## 2024-07-17 PROCEDURE — C8929 TTE W OR WO FOL WCON,DOPPLER: HCPCS

## 2024-07-17 PROCEDURE — 6360000004 HC RX CONTRAST MEDICATION: Performed by: INTERNAL MEDICINE

## 2024-07-17 RX ADMIN — PERFLUTREN 10 ML: 6.52 INJECTION, SUSPENSION INTRAVENOUS at 08:41

## 2024-07-18 DIAGNOSIS — D68.9 COAGULOPATHY (HCC): ICD-10-CM

## 2024-07-18 DIAGNOSIS — E78.00 PURE HYPERCHOLESTEROLEMIA: Chronic | ICD-10-CM

## 2024-07-18 RX ORDER — ATORVASTATIN CALCIUM 80 MG/1
TABLET, FILM COATED ORAL
Qty: 90 TABLET | Refills: 1 | Status: SHIPPED | OUTPATIENT
Start: 2024-07-18

## 2024-07-18 RX ORDER — FOLIC ACID 1 MG/1
TABLET ORAL
Qty: 90 TABLET | Refills: 1 | Status: SHIPPED | OUTPATIENT
Start: 2024-07-18

## 2024-07-18 NOTE — TELEPHONE ENCOUNTER
The pharmacy is  requesting a refill of the below medication which has been pended for you:     Requested Prescriptions     Pending Prescriptions Disp Refills    atorvastatin (LIPITOR) 80 MG tablet [Pharmacy Med Name: Atorvastatin Calcium Oral Tablet 80 MG] 90 tablet 1     Sig: TAKE 1 TABLET BY MOUTH EVERY DAY    folic acid (FOLVITE) 1 MG tablet [Pharmacy Med Name: Folic Acid Oral Tablet 1 MG] 90 tablet 1     Sig: TAKE 1 TABLET BY MOUTH EVERY DAY       Last Appointment Date: 5/28/2024  Next Appointment Date: 9/10/2024    No Known Allergies

## 2024-07-19 ENCOUNTER — TELEPHONE (OUTPATIENT)
Dept: FAMILY MEDICINE CLINIC | Age: 79
End: 2024-07-19

## 2024-07-19 RX ORDER — CEFDINIR 300 MG/1
300 CAPSULE ORAL 2 TIMES DAILY
Qty: 20 CAPSULE | Refills: 0 | Status: SHIPPED | OUTPATIENT
Start: 2024-07-19 | End: 2024-07-29

## 2024-07-19 NOTE — TELEPHONE ENCOUNTER
Epi called and has not been feeling well:    Symptoms:  He has a \"heavy cough\"-deep cough, no phlegm.  No fever, no congestion.  No nausea.  Just wants something for the cough.  When did Symptoms start:  started yesterday  Taking any OTC Medications to help Symptoms:  Last night about 3 am he took Robitussin nighttime cough med which seemed to helped.    Covid Test: No   Are you able to do a Virtual Appointment if your PCP Recommends that: SANDER     Pharmacy:   [facility]    Pt just wanted a message put back.  He said if he doesn't answer his phone we can leave a message.

## 2024-07-24 ENCOUNTER — APPOINTMENT (OUTPATIENT)
Dept: CT IMAGING | Age: 79
End: 2024-07-24
Payer: MEDICARE

## 2024-07-24 ENCOUNTER — HOSPITAL ENCOUNTER (EMERGENCY)
Age: 79
Discharge: HOME | End: 2024-07-24
Attending: EMERGENCY MEDICINE
Payer: MEDICARE

## 2024-07-24 ENCOUNTER — APPOINTMENT (OUTPATIENT)
Dept: GENERAL RADIOLOGY | Age: 79
End: 2024-07-24
Payer: MEDICARE

## 2024-07-24 ENCOUNTER — TELEPHONE (OUTPATIENT)
Dept: FAMILY MEDICINE CLINIC | Age: 79
End: 2024-07-24

## 2024-07-24 VITALS
TEMPERATURE: 97.8 F | BODY MASS INDEX: 31.7 KG/M2 | WEIGHT: 214 LBS | OXYGEN SATURATION: 94 % | HEIGHT: 69 IN | HEART RATE: 70 BPM | SYSTOLIC BLOOD PRESSURE: 109 MMHG | RESPIRATION RATE: 13 BRPM | DIASTOLIC BLOOD PRESSURE: 72 MMHG

## 2024-07-24 DIAGNOSIS — N20.0 NEPHROLITHIASIS: Primary | ICD-10-CM

## 2024-07-24 LAB
ALBUMIN SERPL BCG-MCNC: 4.3 G/DL (ref 3.5–5.1)
ALP SERPL-CCNC: 99 U/L (ref 38–126)
ALT SERPL W/O P-5'-P-CCNC: 29 U/L (ref 11–66)
ANION GAP SERPL CALC-SCNC: 11 MEQ/L (ref 8–16)
AST SERPL-CCNC: 34 U/L (ref 5–40)
BACTERIA URNS QL MICRO: ABNORMAL /HPF
BASOPHILS ABSOLUTE: 0.1 THOU/MM3 (ref 0–0.1)
BASOPHILS NFR BLD AUTO: 0.6 %
BILIRUB SERPL-MCNC: 1.1 MG/DL (ref 0.3–1.2)
BILIRUB UR QL STRIP.AUTO: NEGATIVE
BUN SERPL-MCNC: 13 MG/DL (ref 7–22)
CALCIUM SERPL-MCNC: 9.5 MG/DL (ref 8.5–10.5)
CASTS #/AREA URNS LPF: ABNORMAL /LPF
CASTS 2: ABNORMAL /LPF
CHARACTER UR: CLEAR
CHLORIDE SERPL-SCNC: 103 MEQ/L (ref 98–111)
CO2 SERPL-SCNC: 23 MEQ/L (ref 23–33)
COLOR, UA: YELLOW
CREAT SERPL-MCNC: 1 MG/DL (ref 0.4–1.2)
CRYSTALS URNS MICRO: ABNORMAL
DEPRECATED RDW RBC AUTO: 43.8 FL (ref 35–45)
EOSINOPHIL NFR BLD AUTO: 3.2 %
EOSINOPHILS ABSOLUTE: 0.3 THOU/MM3 (ref 0–0.4)
EPITHELIAL CELLS, UA: ABNORMAL /HPF
ERYTHROCYTE [DISTWIDTH] IN BLOOD BY AUTOMATED COUNT: 13.1 % (ref 11.5–14.5)
FLUAV RNA RESP QL NAA+PROBE: NOT DETECTED
FLUBV RNA RESP QL NAA+PROBE: NOT DETECTED
GFR SERPL CREATININE-BSD FRML MDRD: 76 ML/MIN/1.73M2
GLUCOSE SERPL-MCNC: 111 MG/DL (ref 70–108)
GLUCOSE UR QL STRIP.AUTO: NEGATIVE MG/DL
HCT VFR BLD AUTO: 44.5 % (ref 42–52)
HGB BLD-MCNC: 15.2 GM/DL (ref 14–18)
HGB UR QL STRIP.AUTO: NEGATIVE
IMM GRANULOCYTES # BLD AUTO: 0.02 THOU/MM3 (ref 0–0.07)
IMM GRANULOCYTES NFR BLD AUTO: 0.2 %
KETONES UR QL STRIP.AUTO: NEGATIVE
LYMPHOCYTES ABSOLUTE: 1.1 THOU/MM3 (ref 1–4.8)
LYMPHOCYTES NFR BLD AUTO: 10.6 %
MCH RBC QN AUTO: 31.5 PG (ref 26–33)
MCHC RBC AUTO-ENTMCNC: 34.2 GM/DL (ref 32.2–35.5)
MCV RBC AUTO: 92.1 FL (ref 80–94)
MISCELLANEOUS 2: ABNORMAL
MONOCYTES ABSOLUTE: 1.3 THOU/MM3 (ref 0.4–1.3)
MONOCYTES NFR BLD AUTO: 12.2 %
NEUTROPHILS ABSOLUTE: 7.6 THOU/MM3 (ref 1.8–7.7)
NEUTROPHILS NFR BLD AUTO: 73.2 %
NITRITE UR QL STRIP: NEGATIVE
NRBC BLD AUTO-RTO: 0 /100 WBC
OSMOLALITY SERPL CALC.SUM OF ELEC: 274.6 MOSMOL/KG (ref 275–300)
PH UR STRIP.AUTO: 5 [PH] (ref 5–9)
PLATELET # BLD AUTO: 178 THOU/MM3 (ref 130–400)
PMV BLD AUTO: 9.9 FL (ref 9.4–12.4)
POTASSIUM SERPL-SCNC: 4.2 MEQ/L (ref 3.5–5.2)
PROT SERPL-MCNC: 7 G/DL (ref 6.1–8)
PROT UR STRIP.AUTO-MCNC: NEGATIVE MG/DL
RBC # BLD AUTO: 4.83 MILL/MM3 (ref 4.7–6.1)
RBC URINE: ABNORMAL /HPF
RENAL EPI CELLS #/AREA URNS HPF: ABNORMAL /[HPF]
SARS-COV-2 RNA RESP QL NAA+PROBE: NOT DETECTED
SODIUM SERPL-SCNC: 137 MEQ/L (ref 135–145)
SP GR UR REFRACT.AUTO: > 1.03 (ref 1–1.03)
UROBILINOGEN, URINE: 1 EU/DL (ref 0–1)
WBC # BLD AUTO: 10.4 THOU/MM3 (ref 4.8–10.8)
WBC #/AREA URNS HPF: ABNORMAL /HPF
WBC #/AREA URNS HPF: ABNORMAL /[HPF]
YEAST LIKE FUNGI URNS QL MICRO: ABNORMAL

## 2024-07-24 PROCEDURE — 96374 THER/PROPH/DIAG INJ IV PUSH: CPT

## 2024-07-24 PROCEDURE — 36415 COLL VENOUS BLD VENIPUNCTURE: CPT

## 2024-07-24 PROCEDURE — 99285 EMERGENCY DEPT VISIT HI MDM: CPT

## 2024-07-24 PROCEDURE — 81001 URINALYSIS AUTO W/SCOPE: CPT

## 2024-07-24 PROCEDURE — 96361 HYDRATE IV INFUSION ADD-ON: CPT

## 2024-07-24 PROCEDURE — 96375 TX/PRO/DX INJ NEW DRUG ADDON: CPT

## 2024-07-24 PROCEDURE — 85025 COMPLETE CBC W/AUTO DIFF WBC: CPT

## 2024-07-24 PROCEDURE — 6360000004 HC RX CONTRAST MEDICATION

## 2024-07-24 PROCEDURE — 87636 SARSCOV2 & INF A&B AMP PRB: CPT

## 2024-07-24 PROCEDURE — 2580000003 HC RX 258

## 2024-07-24 PROCEDURE — 74177 CT ABD & PELVIS W/CONTRAST: CPT

## 2024-07-24 PROCEDURE — 71046 X-RAY EXAM CHEST 2 VIEWS: CPT

## 2024-07-24 PROCEDURE — 6360000002 HC RX W HCPCS

## 2024-07-24 PROCEDURE — 80053 COMPREHEN METABOLIC PANEL: CPT

## 2024-07-24 RX ORDER — ONDANSETRON 2 MG/ML
4 INJECTION INTRAMUSCULAR; INTRAVENOUS ONCE
Status: COMPLETED | OUTPATIENT
Start: 2024-07-24 | End: 2024-07-24

## 2024-07-24 RX ORDER — TAMSULOSIN HYDROCHLORIDE 0.4 MG/1
0.4 CAPSULE ORAL DAILY
Qty: 5 CAPSULE | Refills: 0 | Status: SHIPPED | OUTPATIENT
Start: 2024-07-24 | End: 2024-07-25 | Stop reason: ALTCHOICE

## 2024-07-24 RX ORDER — MORPHINE SULFATE 4 MG/ML
4 INJECTION, SOLUTION INTRAMUSCULAR; INTRAVENOUS ONCE
Status: COMPLETED | OUTPATIENT
Start: 2024-07-24 | End: 2024-07-24

## 2024-07-24 RX ORDER — KETOROLAC TROMETHAMINE 30 MG/ML
30 INJECTION, SOLUTION INTRAMUSCULAR; INTRAVENOUS ONCE
Status: COMPLETED | OUTPATIENT
Start: 2024-07-24 | End: 2024-07-24

## 2024-07-24 RX ORDER — 0.9 % SODIUM CHLORIDE 0.9 %
500 INTRAVENOUS SOLUTION INTRAVENOUS ONCE
Status: COMPLETED | OUTPATIENT
Start: 2024-07-24 | End: 2024-07-24

## 2024-07-24 RX ADMIN — SODIUM CHLORIDE 500 ML: 9 INJECTION, SOLUTION INTRAVENOUS at 16:36

## 2024-07-24 RX ADMIN — ONDANSETRON 4 MG: 2 INJECTION INTRAMUSCULAR; INTRAVENOUS at 15:32

## 2024-07-24 RX ADMIN — MORPHINE SULFATE 4 MG: 4 INJECTION, SOLUTION INTRAMUSCULAR; INTRAVENOUS at 15:32

## 2024-07-24 RX ADMIN — KETOROLAC TROMETHAMINE 30 MG: 30 INJECTION, SOLUTION INTRAMUSCULAR at 18:20

## 2024-07-24 RX ADMIN — IOPAMIDOL 80 ML: 755 INJECTION, SOLUTION INTRAVENOUS at 15:52

## 2024-07-24 NOTE — TELEPHONE ENCOUNTER
Pt called say he passed a stone 2 weeks ago, he is having symptoms of passing another one, he has pain and can't urinate, we suggested he go to ER.

## 2024-07-24 NOTE — ED TRIAGE NOTES
Pt reports to ED from home w/ complaints of flank pain. Pt states pain started today around 0400 and was continuous and worsening. Pt reports Hx of kidney stones and states symptoms feel similar. Pt reports taking medication from prev kidney stone - 1 325 mg  norco w/ no symptom relief. Pt reports not being able to urinate and a burning sensation. Pt reports having a productive cough past 3 days and thinking the pain was related to that.  Pt rates flank pain as 8/10 deep and aching. Pt states wearing a CPAP at home at night.

## 2024-07-24 NOTE — ED NOTES
Pt returned to floor from xray. Pt reports current pain as 6/10 in flank. Pt respirations even and unlabored. HOB elevated for pt comfort.

## 2024-07-24 NOTE — DISCHARGE INSTRUCTIONS
Return to the emergency department immediately for any change or worsening symptoms including but not limited to chest pain, shortness of breath, dizziness, nausea or vomiting.  Please go to your urology appointment scheduled for tomorrow morning.  Please take the Flomax as prescribed and your Norco as needed for pain.  Can also take ibuprofen as needed for pain.

## 2024-07-24 NOTE — ED NOTES
Pt resting on cot w/ eyes closed upon entrance. Pt respirations even and unlabored. Pt states pain in chest 8/10.

## 2024-07-24 NOTE — ED PROVIDER NOTES
I performed a history and physical examination of the patient and discussed management with the resident. I reviewed the resident’s note and agree with the documented findings and plan of care. Any areas of disagreement are noted on the chart. I was personally present for the key portions of any procedures. I have documented in the chart those procedures where I was not present during the key portions. I have reviewed the emergency nurses triage note. I agree with the chief complaint, past medical history, past surgical history, allergies, medications, social and family history as documented unless otherwise noted below. Documentation of the HPI, Physical Exam and Medical Decision Making performed by medical students or scribes is based on my personal performance of the HPI, PE and MDM. For Phys Assistant/ Nurse Practitioner cases/documentation I have personally evaluated this patient and have completed at least one if not all key elements of the E/M (history, physical exam, and MDM). My findings are as noted below.    In other words, I personally saw and examined the patient I have reviewed and agreed with the resident findings including all diagnostic interpretations and treatment plans as written.  I was present for the key portion of any procedures performed and the inclusive time noted in any critical care statement.    Patient presents today for flank pain.  Patient states that around 4:00 this afternoon started.  Patient states that he has had kidney stones in the past.  Patient states he had some Norco at home he took it without any symptom relief.  Patient is complaining about burning urination when peeing, no blood in the urine.  Patient has also had a mild productive cough.  Patient states that he recently had a kidney stone.  He apparently cleared this.  He has seen urology in the past.  Here today patient is having pain in the left flank and left lower back area.  Rates it a 6 out of 10.  No radiation 
DIAGNOSES:  Final diagnoses:   Nephrolithiasis       Condition: condition: good  Dispo: Discharge to home  DISPOSITION Decision To Discharge 07/24/2024 06:36:37 PM      This transcription was electronically signed. It was dictated by use of voice recognition software and electronically transcribed. The transcription may contain errors not detected in proofreading.    Electronically signed by Eleuterio Almanza MD on 7/24/24 at 2:51 PM EDT

## 2024-07-24 NOTE — ED NOTES
Pt resting on cot w/ eyes open watching tv upon entrance. Pt states no pain at this time. Pt HOB elevated for comfort. Respirations even and unlabored.

## 2024-07-25 ENCOUNTER — OFFICE VISIT (OUTPATIENT)
Dept: UROLOGY | Age: 79
End: 2024-07-25
Payer: MEDICARE

## 2024-07-25 VITALS
SYSTOLIC BLOOD PRESSURE: 130 MMHG | BODY MASS INDEX: 32.58 KG/M2 | DIASTOLIC BLOOD PRESSURE: 78 MMHG | HEIGHT: 69 IN | TEMPERATURE: 98 F | RESPIRATION RATE: 16 BRPM | WEIGHT: 220 LBS

## 2024-07-25 DIAGNOSIS — N13.8 BPH WITH OBSTRUCTION/LOWER URINARY TRACT SYMPTOMS: ICD-10-CM

## 2024-07-25 DIAGNOSIS — N13.2 HYDRONEPHROSIS CONCURRENT WITH AND DUE TO CALCULI OF KIDNEY AND URETER: ICD-10-CM

## 2024-07-25 DIAGNOSIS — N40.1 BPH WITH OBSTRUCTION/LOWER URINARY TRACT SYMPTOMS: ICD-10-CM

## 2024-07-25 DIAGNOSIS — R97.20 ELEVATED PSA: ICD-10-CM

## 2024-07-25 DIAGNOSIS — R30.0 DYSURIA: ICD-10-CM

## 2024-07-25 DIAGNOSIS — N20.1 LEFT URETERAL CALCULUS: Primary | ICD-10-CM

## 2024-07-25 LAB
BILIRUBIN, URINE: NEGATIVE
BLOOD URINE, POC: NEGATIVE
CHARACTER, URINE: CLEAR
COLOR, UA: YELLOW
GLUCOSE URINE: NEGATIVE MG/DL
KETONES, URINE: NEGATIVE
LEUKOCYTE CLUMPS, URINE: ABNORMAL
NITRITE, URINE: NEGATIVE
PH, URINE: 5 (ref 5–9)
POST VOID RESIDUAL (PVR): 93 ML
PROTEIN, URINE: NEGATIVE MG/DL
SPECIFIC GRAVITY UA: 1.02 (ref 1–1.03)
UROBILINOGEN, URINE: 0.2 EU/DL (ref 0–1)

## 2024-07-25 PROCEDURE — 3078F DIAST BP <80 MM HG: CPT | Performed by: NURSE PRACTITIONER

## 2024-07-25 PROCEDURE — 1123F ACP DISCUSS/DSCN MKR DOCD: CPT | Performed by: NURSE PRACTITIONER

## 2024-07-25 PROCEDURE — 99214 OFFICE O/P EST MOD 30 MIN: CPT | Performed by: NURSE PRACTITIONER

## 2024-07-25 PROCEDURE — 81003 URINALYSIS AUTO W/O SCOPE: CPT | Performed by: NURSE PRACTITIONER

## 2024-07-25 PROCEDURE — 51798 US URINE CAPACITY MEASURE: CPT | Performed by: NURSE PRACTITIONER

## 2024-07-25 PROCEDURE — 3075F SYST BP GE 130 - 139MM HG: CPT | Performed by: NURSE PRACTITIONER

## 2024-07-25 RX ORDER — TAMSULOSIN HYDROCHLORIDE 0.4 MG/1
0.4 CAPSULE ORAL DAILY
Qty: 30 CAPSULE | Refills: 0 | Status: SHIPPED | OUTPATIENT
Start: 2024-07-25

## 2024-07-25 NOTE — PROGRESS NOTES
accompanying report.  The study demonstrates   eft-sided hydronephrosis and hydroureter secondary to a 4.8 mm stone at the left UVJ. Calcified gallstones.  Enlarged prostate gland.      Assessment:   1. Left ureteral calculus with hydro.  -Seen on CT in June 2024, MET completed. Appeared to have passed stone on own as no pain and KUB/DAPHNE negative.  -Repeat KUB and DAPHNE in July 2024 no hydro and no stone seen.  -Left-sided hydronephrosis and hydroureter secondary to a 4.8 mm stone at the left UVJ. Calcified gallstones.  Enlarged prostate gland.  - POCT Urinalysis No Micro (Auto)  - poct post void residual    2. Dysuria  - poct post void residual       Plan:     Kidney stone prevention. Handouts provided previously.   -Increase water to 60 to 80 ounces a day  -Add mai to water  -Limit salt intake  -Low oxalate diet  -Limit animal protein to 8 ounces a day     Urine culture  Continue flomax. Script given  Ibuprofen PRN/Tylenol PRN.  Strain urine     Litholink declined previously but need to reconsider.     PSA was previously ordered by PCP to follow up on elevated PSA after antibiotic.  Pt reports being repeated in 8/2024 by PCP. Order provided to ensure completed.      Follow PCP for aneurysms seen on CT/KUB.     Cystoscopy, left ureteroscopy, laser lithotripsy, basket retrieval of stone fragments, and left ureteral stent placement per any provider..     If fevers, chills, uncontrolled pain need to go to ER.               MELI Degroot, CNP  Urology

## 2024-07-25 NOTE — PATIENT INSTRUCTIONS
Continue flomax 0.4 daily  Ibuprofen PRN  Tylenol PRN    Cystoscopy, left ureteroscopy, laser lithotripsy, basket retrieval of stone fragments, and left ureteral stent placement per . Any provider pt chooses    Kidney stone prevention. Handouts provided previously.   -Increase water to 60 to 80 ounces a day  -Add mai to water  -Limit salt intake  -Low oxalate diet  -Limit animal protein to 8 ounces a day     If fevers, chills, uncontrolled pain need to go to ER.

## 2024-07-27 LAB — BACTERIA UR CULT: NORMAL

## 2024-07-29 ENCOUNTER — TELEPHONE (OUTPATIENT)
Dept: UROLOGY | Age: 79
End: 2024-07-29

## 2024-07-29 ENCOUNTER — OFFICE VISIT (OUTPATIENT)
Dept: FAMILY MEDICINE CLINIC | Age: 79
End: 2024-07-29

## 2024-07-29 VITALS
WEIGHT: 220 LBS | RESPIRATION RATE: 18 BRPM | BODY MASS INDEX: 32.58 KG/M2 | DIASTOLIC BLOOD PRESSURE: 66 MMHG | HEIGHT: 69 IN | SYSTOLIC BLOOD PRESSURE: 134 MMHG | HEART RATE: 64 BPM

## 2024-07-29 DIAGNOSIS — I10 ESSENTIAL HYPERTENSION: ICD-10-CM

## 2024-07-29 DIAGNOSIS — E78.00 PURE HYPERCHOLESTEROLEMIA: Chronic | ICD-10-CM

## 2024-07-29 DIAGNOSIS — J20.9 ACUTE BRONCHITIS, UNSPECIFIED ORGANISM: ICD-10-CM

## 2024-07-29 DIAGNOSIS — I50.42 CHRONIC COMBINED SYSTOLIC AND DIASTOLIC CONGESTIVE HEART FAILURE (HCC): ICD-10-CM

## 2024-07-29 DIAGNOSIS — K21.9 GASTROESOPHAGEAL REFLUX DISEASE WITHOUT ESOPHAGITIS: ICD-10-CM

## 2024-07-29 DIAGNOSIS — N20.0 KIDNEY STONE ON LEFT SIDE: Primary | ICD-10-CM

## 2024-07-29 PROCEDURE — 99213 OFFICE O/P EST LOW 20 MIN: CPT | Performed by: FAMILY MEDICINE

## 2024-07-29 PROCEDURE — 1123F ACP DISCUSS/DSCN MKR DOCD: CPT | Performed by: FAMILY MEDICINE

## 2024-07-29 RX ORDER — BENZONATATE 200 MG/1
200 CAPSULE ORAL 3 TIMES DAILY PRN
Qty: 30 CAPSULE | Refills: 0 | Status: SHIPPED | OUTPATIENT
Start: 2024-07-29 | End: 2024-08-08

## 2024-07-29 RX ORDER — CEFDINIR 300 MG/1
300 CAPSULE ORAL 2 TIMES DAILY
Qty: 14 CAPSULE | Refills: 0 | Status: SHIPPED | OUTPATIENT
Start: 2024-07-29 | End: 2024-08-05

## 2024-07-29 ASSESSMENT — ENCOUNTER SYMPTOMS
BLOOD IN STOOL: 0
CHEST TIGHTNESS: 0
CONSTIPATION: 0
RHINORRHEA: 0
BACK PAIN: 0
TROUBLE SWALLOWING: 0
SHORTNESS OF BREATH: 0
EYE PAIN: 0
ABDOMINAL PAIN: 0
SORE THROAT: 0
COUGH: 1
NAUSEA: 0

## 2024-07-29 NOTE — PROGRESS NOTES
Subjective   Patient ID: Epi Franco is a 79 y.o. male.       Right  side  with  stone     noted  and  4.8  mm  on  7-24-24  and  persist  stone  and  persist and   for    stone  removal        And  left   kidney   still  hydronephrosis       Ashd   stable     Cough  This is a new problem. The current episode started 1 to 4 weeks ago. The problem has been gradually improving. The problem occurs constantly. The cough is Non-productive. Pertinent negatives include no chest pain, ear pain, fever, headaches, nasal congestion, postnasal drip, rash, rhinorrhea, sore throat or shortness of breath. He has tried nothing for the symptoms. The treatment provided no relief.     Past Medical History:   Diagnosis Date    CAD (coronary artery disease) 01/2006    cath with stent lad    Chronic back pain 2013      epidural block    Colon polyps 10/2005    Diverticulosis of colon     DVT of proximal leg (deep vein thrombosis) ([facility]) 12/04 2013    left     right  in 2013    DVT of proximal leg (deep vein thrombosis) ([facility])     left     GERD (gastroesophageal reflux disease)     Kidney stone on left side 12/2020    left  side  in  2009  also    Pulmonary embolus ([facility]) 6-2013 and  1-2015     had   right  leg  dvt    S/P cardiac cath 2014 at [facility]- patent LAD stent done 2006 and 40 to 50% lesions in other coronaries 04/12/2019    S/P cardiac cath 4/17/19- large all ectatic vessel, lm-p, lad prox 40%, mid 50%, distal 40 distal stent -patent, lcx prox 40%, rca prox 30%, dist 40%, edp 11, ef 50%- med  RX 04/17/2019    SOB (shortness of breath) on exertion 06/08/2020    Unspecified sleep apnea 2013    cpap      Review of Systems   Constitutional:  Negative for fatigue and fever.   HENT:  Negative for congestion, ear pain, postnasal drip, rhinorrhea, sore throat and trouble swallowing.    Eyes:  Negative for pain.   Respiratory:  Positive for cough. Negative for chest tightness and shortness of breath.    Cardiovascular:  Negative for

## 2024-08-01 ENCOUNTER — TELEPHONE (OUTPATIENT)
Dept: UROLOGY | Age: 79
End: 2024-08-01

## 2024-08-01 NOTE — TELEPHONE ENCOUNTER
Patient scheduled for a Cystoscopy, left ureteroscopy, laser lithotripsy, basket retrieval of stone fragments, and  left ureteral stent placement with Dr Soriano on 8/14/24. We are asking for clearance. Thanks

## 2024-08-01 NOTE — TELEPHONE ENCOUNTER
Pre op Risk Assessment    Procedure Cystoscopy, left ureteroscopy, laser lithotripsy, basket retrieval of stone fragments, and left ureteral stent placement   Physician Christie   Date of surgery/procedure 8/14/24    Last OV 7/17/24  Last Stress 4/11/19  Last Echo 7/17/24  Last Cath 2019  Last Stent 2006  Is patient on blood thinners ASA   Hold Meds/how many days 5

## 2024-08-01 NOTE — TELEPHONE ENCOUNTER
SURGERY SCHEDULING FORM   [place] [address]      Phone *[phone] *[phone]   Surgical Scheduling Direct Line Phone *[phone] Fax *[phone]      Epi Meadowsbrittny 1945 male    [place] [address]   Marital Status:          Home Phone: [phone]      Cell Phone:    Telephone Information:   Mobile [phone]          Surgeon: Dr. Soriano       Surgery Date: 8/14/24       Time: 10:30 am    Procedure: Cystoscopy, left ureteroscopy, laser lithotripsy, basket retrieval of stone fragments, and  left ureteral stent placement.     Diagnosis: Kidney Stone     Important Medical History:  In Epic    Special Inst/Equip:     CPT Codes:    62582  Latex Allergy: No     Cardiac Device:  No    Anesthesia:  General          Admission Type:  Same Day                        Admit Prior to Day of Surgery: No    Case Location:  Main OR            Preadmission Testing:  Phone Call          PAT Date and Time:______________________________________________________    PAT Confirmation #: ______________________________________________________    Post Op Visit: ___________________________________________________________    Need Preop Cardiac Clearance: Yes    Does Patient have Cardiologist/physician?     Dr Murguia    Surgery Confirmation #: __________________________________________________    : ________________________   Date: __________________________     Insurance Company Name: [employer]

## 2024-08-01 NOTE — TELEPHONE ENCOUNTER
DO NOT TAKE  FISH OIL, MOBIC, IBUPROFEN, MOTRIN-LIKE DRUGS AND ANY MULTIVITAMINS OR OVER THE COUNTER SUPPLEMENTS 14 DAYS PRIOR TO SURGERY.    HOLD ASPIRIN 5 DAYS PRIOR TO SURGERY    HOLD THE COUMADIN 5 DAYS PRIOR TO THE SURGERY    IF YOU TAKE GLUCOPHAGE, TRADJENTA, METFORMIN OR JANUMET, HOLD 2 DAYS PRIOR TO SURGERY    MUST HAVE AN ADULT OVER THE AGE OF 18 WITH YOU AT THE TIME OF THE DISCHARGE         Epi Franco 1945     Surgical Physician: Dr. Soriano      You have been scheduled for the procedure marked below:      Surgery: Cystoscopy, left ureteroscopy, laser lithotripsy, basket retrieval of stone fragments, and  left ureteral stent placement.          Date: 8/14/24     Anesthesia:  General     Place of Service: [facility] --Second Floor Same Day Surgery         Arrive to same day surgery at:  8:30 AM  (Surgery time is subject to change)      INSTRUCTIONS AS MARKED BELOW:    1.  DO NOT eat or drink anything after midnight before surgery.  2.  We prefer you shower or bathe with an antibacterial soap (Dial) the morning of surgery.  3  Please bring a current medication list, photo ID and insurance card(s) with you  4. Okay to take Tylenol  5. Take blood pressure or heart medication as directed, if taken in the morning take with a small sip of water  6.The office will call you in 1-2 days after your procedure to schedule a follow up.          Date: 8/1/2024

## 2024-08-02 ENCOUNTER — PREP FOR PROCEDURE (OUTPATIENT)
Dept: UROLOGY | Age: 79
End: 2024-08-02

## 2024-08-02 ENCOUNTER — TELEPHONE (OUTPATIENT)
Dept: UROLOGY | Age: 79
End: 2024-08-02

## 2024-08-04 RX ORDER — SODIUM CHLORIDE 9 MG/ML
INJECTION, SOLUTION INTRAVENOUS PRN
Status: CANCELLED | OUTPATIENT
Start: 2024-08-04

## 2024-08-04 RX ORDER — SODIUM CHLORIDE 0.9 % (FLUSH) 0.9 %
5-40 SYRINGE (ML) INJECTION PRN
Status: CANCELLED | OUTPATIENT
Start: 2024-08-04

## 2024-08-04 RX ORDER — SODIUM CHLORIDE 0.9 % (FLUSH) 0.9 %
5-40 SYRINGE (ML) INJECTION EVERY 12 HOURS SCHEDULED
Status: CANCELLED | OUTPATIENT
Start: 2024-08-04

## 2024-08-05 ENCOUNTER — TELEPHONE (OUTPATIENT)
Dept: UROLOGY | Age: 79
End: 2024-08-05

## 2024-08-05 NOTE — TELEPHONE ENCOUNTER
Good Morning. This is Johanny. Your are doing a [employer] on Epi Franco ( 1945) on 8/14/24. Cardio wants him to stay on his 81 mg asa. Ok with you?  Read 8/5/2024 8:24 AM  8/5/2024 8:25 AM    Yes      Have attempted to reach the patient for several days to let him know he is to stay on his asa. His voicemail is full.Left messages on his wife's phone and ask them to call back to confirm. They have yet to call back.

## 2024-08-07 ASSESSMENT — ENCOUNTER SYMPTOMS
ABDOMINAL PAIN: 0
COUGH: 0
SHORTNESS OF BREATH: 0

## 2024-08-14 ENCOUNTER — ANESTHESIA (OUTPATIENT)
Dept: OPERATING ROOM | Age: 79
End: 2024-08-14
Payer: MEDICARE

## 2024-08-14 ENCOUNTER — ANESTHESIA EVENT (OUTPATIENT)
Dept: OPERATING ROOM | Age: 79
End: 2024-08-14
Payer: MEDICARE

## 2024-08-14 ENCOUNTER — HOSPITAL ENCOUNTER (OUTPATIENT)
Age: 79
Setting detail: OUTPATIENT SURGERY
Discharge: HOME | DRG: 660 | End: 2024-08-14
Attending: UROLOGY | Admitting: UROLOGY
Payer: MEDICARE

## 2024-08-14 VITALS
TEMPERATURE: 96.7 F | DIASTOLIC BLOOD PRESSURE: 73 MMHG | WEIGHT: 215.4 LBS | BODY MASS INDEX: 31.9 KG/M2 | RESPIRATION RATE: 16 BRPM | OXYGEN SATURATION: 95 % | HEART RATE: 72 BPM | HEIGHT: 69 IN | SYSTOLIC BLOOD PRESSURE: 110 MMHG

## 2024-08-14 DIAGNOSIS — G89.18 POSTOPERATIVE PAIN: Primary | ICD-10-CM

## 2024-08-14 LAB — INR PPP: 1.29 (ref 0.85–1.13)

## 2024-08-14 PROCEDURE — 7100000000 HC PACU RECOVERY - FIRST 15 MIN: Performed by: UROLOGY

## 2024-08-14 PROCEDURE — 2500000003 HC RX 250 WO HCPCS: Performed by: NURSE ANESTHETIST, CERTIFIED REGISTERED

## 2024-08-14 PROCEDURE — C2617 STENT, NON-COR, TEM W/O DEL: HCPCS | Performed by: UROLOGY

## 2024-08-14 PROCEDURE — 36415 COLL VENOUS BLD VENIPUNCTURE: CPT

## 2024-08-14 PROCEDURE — 0TC78ZZ EXTIRPATION OF MATTER FROM LEFT URETER, VIA NATURAL OR ARTIFICIAL OPENING ENDOSCOPIC: ICD-10-PCS | Performed by: UROLOGY

## 2024-08-14 PROCEDURE — 7100000001 HC PACU RECOVERY - ADDTL 15 MIN: Performed by: UROLOGY

## 2024-08-14 PROCEDURE — 6360000002 HC RX W HCPCS: Performed by: UROLOGY

## 2024-08-14 PROCEDURE — 85610 PROTHROMBIN TIME: CPT

## 2024-08-14 PROCEDURE — 2720000010 HC SURG SUPPLY STERILE: Performed by: UROLOGY

## 2024-08-14 PROCEDURE — 3700000000 HC ANESTHESIA ATTENDED CARE: Performed by: UROLOGY

## 2024-08-14 PROCEDURE — 6360000002 HC RX W HCPCS: Performed by: NURSE ANESTHETIST, CERTIFIED REGISTERED

## 2024-08-14 PROCEDURE — 7100000011 HC PHASE II RECOVERY - ADDTL 15 MIN: Performed by: UROLOGY

## 2024-08-14 PROCEDURE — 2709999900 HC NON-CHARGEABLE SUPPLY: Performed by: UROLOGY

## 2024-08-14 PROCEDURE — 3700000001 HC ADD 15 MINUTES (ANESTHESIA): Performed by: UROLOGY

## 2024-08-14 PROCEDURE — 2580000003 HC RX 258: Performed by: UROLOGY

## 2024-08-14 PROCEDURE — 3600000003 HC SURGERY LEVEL 3 BASE: Performed by: UROLOGY

## 2024-08-14 PROCEDURE — 0T778DZ DILATION OF LEFT URETER WITH INTRALUMINAL DEVICE, VIA NATURAL OR ARTIFICIAL OPENING ENDOSCOPIC: ICD-10-PCS | Performed by: UROLOGY

## 2024-08-14 PROCEDURE — 7100000010 HC PHASE II RECOVERY - FIRST 15 MIN: Performed by: UROLOGY

## 2024-08-14 PROCEDURE — C1769 GUIDE WIRE: HCPCS | Performed by: UROLOGY

## 2024-08-14 PROCEDURE — C1758 CATHETER, URETERAL: HCPCS | Performed by: UROLOGY

## 2024-08-14 PROCEDURE — 3600000013 HC SURGERY LEVEL 3 ADDTL 15MIN: Performed by: UROLOGY

## 2024-08-14 DEVICE — URETERAL STENT
Type: IMPLANTABLE DEVICE | Status: FUNCTIONAL
Brand: PERCUFLEX™ PLUS

## 2024-08-14 RX ORDER — CEFDINIR 300 MG/1
300 CAPSULE ORAL 2 TIMES DAILY
Qty: 14 CAPSULE | Refills: 0 | Status: ON HOLD | OUTPATIENT
Start: 2024-08-14 | End: 2024-08-18 | Stop reason: HOSPADM

## 2024-08-14 RX ORDER — EPHEDRINE SULFATE/0.9% NACL/PF 25 MG/5 ML
SYRINGE (ML) INTRAVENOUS PRN
Status: DISCONTINUED | OUTPATIENT
Start: 2024-08-14 | End: 2024-08-14 | Stop reason: SDUPTHER

## 2024-08-14 RX ORDER — PROPOFOL 10 MG/ML
INJECTION, EMULSION INTRAVENOUS PRN
Status: DISCONTINUED | OUTPATIENT
Start: 2024-08-14 | End: 2024-08-14 | Stop reason: SDUPTHER

## 2024-08-14 RX ORDER — SODIUM CHLORIDE 0.9 % (FLUSH) 0.9 %
5-40 SYRINGE (ML) INJECTION EVERY 12 HOURS SCHEDULED
Status: DISCONTINUED | OUTPATIENT
Start: 2024-08-14 | End: 2024-08-14 | Stop reason: HOSPADM

## 2024-08-14 RX ORDER — ONDANSETRON 2 MG/ML
INJECTION INTRAMUSCULAR; INTRAVENOUS PRN
Status: DISCONTINUED | OUTPATIENT
Start: 2024-08-14 | End: 2024-08-14 | Stop reason: SDUPTHER

## 2024-08-14 RX ORDER — SODIUM CHLORIDE 0.9 % (FLUSH) 0.9 %
5-40 SYRINGE (ML) INJECTION PRN
Status: DISCONTINUED | OUTPATIENT
Start: 2024-08-14 | End: 2024-08-14 | Stop reason: HOSPADM

## 2024-08-14 RX ORDER — HYDROCODONE BITARTRATE AND ACETAMINOPHEN 5; 325 MG/1; MG/1
1 TABLET ORAL EVERY 6 HOURS PRN
Qty: 12 TABLET | Refills: 0 | Status: SHIPPED | OUTPATIENT
Start: 2024-08-14 | End: 2024-08-19

## 2024-08-14 RX ORDER — SODIUM CHLORIDE 9 MG/ML
INJECTION, SOLUTION INTRAVENOUS PRN
Status: DISCONTINUED | OUTPATIENT
Start: 2024-08-14 | End: 2024-08-14 | Stop reason: HOSPADM

## 2024-08-14 RX ORDER — VASOPRESSIN 20 U/ML
INJECTION PARENTERAL PRN
Status: DISCONTINUED | OUTPATIENT
Start: 2024-08-14 | End: 2024-08-14 | Stop reason: SDUPTHER

## 2024-08-14 RX ORDER — OXYBUTYNIN CHLORIDE 10 MG/1
10 TABLET, EXTENDED RELEASE ORAL DAILY
Qty: 30 TABLET | Refills: 2 | Status: SHIPPED | OUTPATIENT
Start: 2024-08-14

## 2024-08-14 RX ORDER — DEXAMETHASONE SODIUM PHOSPHATE 10 MG/ML
INJECTION, EMULSION INTRAMUSCULAR; INTRAVENOUS PRN
Status: DISCONTINUED | OUTPATIENT
Start: 2024-08-14 | End: 2024-08-14 | Stop reason: SDUPTHER

## 2024-08-14 RX ORDER — FENTANYL CITRATE 50 UG/ML
INJECTION, SOLUTION INTRAMUSCULAR; INTRAVENOUS PRN
Status: DISCONTINUED | OUTPATIENT
Start: 2024-08-14 | End: 2024-08-14 | Stop reason: SDUPTHER

## 2024-08-14 RX ADMIN — FENTANYL CITRATE 50 MCG: 50 INJECTION, SOLUTION INTRAMUSCULAR; INTRAVENOUS at 11:39

## 2024-08-14 RX ADMIN — PROPOFOL 150 MG: 10 INJECTION, EMULSION INTRAVENOUS at 11:05

## 2024-08-14 RX ADMIN — EPHEDRINE SULFATE 10 MG: 5 INJECTION INTRAVENOUS at 11:25

## 2024-08-14 RX ADMIN — WATER 2000 MG: 1 INJECTION INTRAMUSCULAR; INTRAVENOUS; SUBCUTANEOUS at 11:10

## 2024-08-14 RX ADMIN — ONDANSETRON 4 MG: 2 INJECTION INTRAMUSCULAR; INTRAVENOUS at 11:10

## 2024-08-14 RX ADMIN — FENTANYL CITRATE 25 MCG: 50 INJECTION, SOLUTION INTRAMUSCULAR; INTRAVENOUS at 11:15

## 2024-08-14 RX ADMIN — FENTANYL CITRATE 25 MCG: 50 INJECTION, SOLUTION INTRAMUSCULAR; INTRAVENOUS at 11:17

## 2024-08-14 RX ADMIN — EPHEDRINE SULFATE 5 MG: 5 INJECTION INTRAVENOUS at 11:30

## 2024-08-14 RX ADMIN — EPHEDRINE SULFATE 5 MG: 5 INJECTION INTRAVENOUS at 11:17

## 2024-08-14 RX ADMIN — EPHEDRINE SULFATE 5 MG: 5 INJECTION INTRAVENOUS at 11:34

## 2024-08-14 RX ADMIN — VASOPRESSIN 1 UNITS: 20 INJECTION INTRAVENOUS at 11:14

## 2024-08-14 RX ADMIN — SODIUM CHLORIDE: 9 INJECTION, SOLUTION INTRAVENOUS at 09:46

## 2024-08-14 RX ADMIN — DEXAMETHASONE SODIUM PHOSPHATE 10 MG: 10 INJECTION, EMULSION INTRAMUSCULAR; INTRAVENOUS at 11:10

## 2024-08-14 ASSESSMENT — PAIN - FUNCTIONAL ASSESSMENT
PAIN_FUNCTIONAL_ASSESSMENT: 0-10
PAIN_FUNCTIONAL_ASSESSMENT: NONE - DENIES PAIN

## 2024-08-14 ASSESSMENT — PAIN SCALES - WONG BAKER: WONGBAKER_NUMERICALRESPONSE: NO HURT

## 2024-08-14 ASSESSMENT — ENCOUNTER SYMPTOMS: SHORTNESS OF BREATH: 1

## 2024-08-14 NOTE — H&P
LUIZ GAR MD  History and Physical    Patient:  Epi Franco  MRN: [id number]  YOB: 1945    HISTORY OF PRESENT ILLNESS:     The patient is a 79 y.o. male who presents with ureteral stoen. Here for procedure.    Patient's old records, notes and chart reviewed and summarized above.     LUIZ GAR MD independently reviewed the images and verified the radiology reports from:    CT ABDOMEN PELVIS W IV CONTRAST Additional Contrast? None    Result Date: 7/24/2024  PROCEDURE: CT ABDOMEN PELVIS W IV CONTRAST CLINICAL INFORMATION: left flank pain, hx kidney stones . COMPARISON: CT scan of the abdomen and pelvis dated 6/11/2024. TECHNIQUE: Axial 5 mm CT images were obtained through the abdomen and pelvis after the administration of intravenous contrast. Coronal and sagittal reconstructions were obtained. All CT scans at this facility use dose modulation, iterative reconstruction, and/or weight-based dosing when appropriate to reduce radiation dose to as low as reasonably achievable. FINDINGS: The visualized aspects of the lung bases are clear.   The base of the heart is within appropriate limits. There is mild diffuse fatty replacement in the liver.. The spleen is normal. The adrenals and pancreas are normal. There are calcified gallstones present..   There is left-sided hydronephrosis and hydroureter secondary to a 4.8 mm stone at the left ureterovesical junction. The right kidney is unremarkable.. There is a small hiatal hernia. No abnormalities of the small bowel loops are noted. There is atherosclerotic calcification in the abdominal aorta, iliac and visceral arteries.. There is no adenopathy.  The urinary bladder is normal. The prostate gland measures 5.4 x 5.3 cm in size. There is no pelvic free fluid. There is extensive diverticulosis involving the sigmoid colon..  There is no adenopathy. There is a right hip replacement. There is lumbar spondylosis.     1. Left-sided hydronephrosis and  (CARDIA)     Difficulty of Paying Living Expenses: Not hard at all   Food Insecurity: No Food Insecurity (2/22/2024)    Hunger Vital Sign     Worried About Running Out of Food in the Last Year: Never true     Ran Out of Food in the Last Year: Never true   Transportation Needs: Unknown (2/22/2024)    PRAPARE - Transportation     Lack of Transportation (Medical): Not on file     Lack of Transportation (Non-Medical): No   Physical Activity: Insufficiently Active (2/22/2024)    Exercise Vital Sign     Days of Exercise per Week: 2 days     Minutes of Exercise per Session: 60 min   Stress: Not on file   Social Connections: Not on file   Intimate Partner Violence: Not on file   Housing Stability: Unknown (2/22/2024)    Housing Stability Vital Sign     Unable to Pay for Housing in the Last Year: Not on file     Number of Places Lived in the Last Year: Not on file     Unstable Housing in the Last Year: No       Family History:    Family History   Problem Relation Age of Onset    Heart Disease Father        REVIEW OF SYSTEMS:  Constitutional: negative  Eyes: negative  Respiratory: negative  Cardiovascular: negative  Gastrointestinal: negative  Genitourinary: no acute issues  Musculoskeletal: negative  Skin: negative   Neurological: negative  Hematological/Lymphatic: negative  Psychological: negative    Physical Exam:      No data found.  Constitutional: Patient in no acute distress;   Neuro: alert and oriented to person place and time.    Psych: Mood and affect normal.  Skin: Normal  Lungs: Respiratory effort normal, CTA  Cardiovascular:  Normal peripheral pulses; no murmur. Normal rhythm  Abdomen: Soft, non-tender, non-distended with no CVA, flank pain, hepatosplenomegaly or hernia.  Kidneys normal.  Bladder non-tender and not distended.      LABS:   No results for input(s): \"WBC\", \"HGB\", \"HCT\", \"MCV\", \"PLT\" in the last 72 hours.  No results for input(s): \"NA\", \"K\", \"CL\", \"CO2\", \"PHOS\", \"BUN\", \"CREATININE\" in the last 72

## 2024-08-14 NOTE — PROGRESS NOTES
Pt has met discharge criteria and states he is ready for discharge to home. IV removed, gauze and tape applied. Dressed in own clothes and personal belongings gathered. Discharge instructions (with opioid medication education information) given to pt and family. Pt and family verbalized understanding of discharge instructions, prescriptions and follow up appointments. Per Dr. Soriano patient ok to take blood thinners per clinic, this RN notified pt who verbalized understanding. Pt transported to discharge lobby by [facility] staff.

## 2024-08-14 NOTE — PROGRESS NOTES
Pt admitted to [facility] room 12 and oriented to unit. SCD sleeves applied. Nares swabbed. Pt verbalized permission for first name, last initial and physicians name on white board. SDS board and discharge criteria explained, pt and family verbalized understanding. Pt denies thoughts of harming self or others. Call light in reach. Family at the bedside.  Gaudencio [phone]

## 2024-08-14 NOTE — DISCHARGE INSTRUCTIONS
Pt ok to discharge home in good condition  No heavy lifting, >10 lbs for today  Pt should avoid strenuous activity for today  Pt should walk moderately at home  Pt ok to shower   Pt may resume diet as tolerated  Pt should take Rx as directed  No driving while on narcotics  Please call attending physician or hospital  with questions  Call or Present to ED if fever (> 101F), intractable nausea vomiting or pain.  Rx in chart    Pt should follow up with LUIZ GAR MD, in 2 weeks, call to confirm appointment      Pt should Pull stent in 5 days. There may be some pain associated with the stent removal, which is usually self-limiting.  We suggest using the pain medication prescribed for you and a nonsteroidal anti-inflammatory such as Ibuprofen, if you are able to take this medication, to control symptoms.  Please stay hydrated. Please call with questions.          Shruthi Marrufo is a 4 month old female presenting for   Chief Complaint   Patient presents with   • Well Infant Birth to 23 Months     Denies Latex allergy or sensitivity.    Medication verified and med list updated  Refills needed today: No    Health Maintenance Due   Topic Date Due   • IPV Vaccine (2 of 4 - 4-dose series) 2021   • HIB Vaccine (2 of 3 - PRP-OMP Series) 2021   • Rotavirus Vaccine (2 of 3 - 3-dose series) 2021   • DTaP/Tdap/Td Vaccine (2 - DTaP) 2021   • Well Child Exam 4 Months  2021   • Pneumococcal Vaccine 0-64 (2 of 4) 2021       Patient is due for the topics as listed above and wishes to proceed with them. Orders placed for Immunization(s) Dtap/Tdap/Td, Hep B, HIB, IPV, Pneumococcal and Rotavirus.          Last lab results:   No results found for: HGBA1C  No results found for: CHOLESTEROL  No results found for: HDL  No results found for: TRIGLYCERIDE  No results found for: CALCLDL  No results found for: URMIC, UCR, MALBCR  No results found for: IFOB              Depression Screening:       Advance Directives:  not discussed

## 2024-08-14 NOTE — PROGRESS NOTES
1143 pt arrived to PACU, awakens to voice and denies pain. Stent with strings in place. VSS  1150 pt awake in bed, denies pain or nausea. VSS  1200 pt awake in bed, denies pain or nausea. VSS  1213 meets criteria for discharge from PACU, transported to [facility] in stable condition

## 2024-08-14 NOTE — ANESTHESIA POSTPROCEDURE EVALUATION
Department of Anesthesiology  Postprocedure Note    Patient: Epi Franco  MRN: [id number]  YOB: 1945  Date of evaluation: 8/14/2024    Procedure Summary       Date: 08/14/24 [facility]    Anesthesia Start: 1102 Anesthesia Stop: 1146    Procedure: Cystoscopy Left Ureteroscopy Laser Lithotripsy Basket Retrieval of Stone Fragments Left Ureteral Stent Placement Diagnosis:       Kidney stone      (Kidney stone [N20.0])    Surgeons: Sloan Soriano MD Responsible Provider: Kwesi Phillips DO    Anesthesia Type: general ASA Status: 3            Anesthesia Type: No value filed.    Etienne Phase I: Etienne Score: 10    Etienne Phase II:      Anesthesia Post Evaluation    Patient location during evaluation: PACU  Patient participation: complete - patient participated  Level of consciousness: sleepy but conscious and awake  Pain score: 0  Airway patency: patent  Nausea & Vomiting: no nausea and no vomiting  Cardiovascular status: blood pressure returned to baseline  Respiratory status: acceptable, spontaneous ventilation and nonlabored ventilation  Hydration status: stable  Multimodal analgesia pain management approach  Pain management: adequate        No notable events documented.

## 2024-08-14 NOTE — ANESTHESIA PRE PROCEDURE
Department of Anesthesiology  Preprocedure Note       Name:  Epi Franco   Age:  79 y.o.  :  1945                                          MRN:  [id number]         Date:  2024      Surgeon: Surgeon(s):  Sloan Soriano MD    Procedure: Procedure(s):  Cystoscopy Left Ureteroscopy Laser Lithotripsy Basket Retrieval of Stone Fragments Left Ureteral Stent Placement    Medications prior to admission:   Prior to Admission medications    Medication Sig Start Date End Date Taking? Authorizing Provider   Enoxaparin Sodium (LOVENOX SC) Inject into the skin   Yes Provider, MD Randall   tamsulosin (FLOMAX) 0.4 MG capsule Take 1 capsule by mouth daily 24   Margret Foley APRN - CNP   atorvastatin (LIPITOR) 80 MG tablet TAKE 1 TABLET BY MOUTH EVERY DAY 24   Santiago Tabor MD   folic acid (FOLVITE) 1 MG tablet TAKE 1 TABLET BY MOUTH EVERY DAY 24   Santiago Tabor MD   ondansetron (ZOFRAN-ODT) 4 MG disintegrating tablet Take 1 tablet by mouth every 8 hours as needed for Nausea or Vomiting 24   Asuncion Figueroa PA-C   warfarin (JANTOVEN) 5 MG tablet TAKE by mouth AS DIRECTED BY COUMADIN CLINIC 24   Santiago Tabor MD   metoprolol succinate (TOPROL XL) 25 MG extended release tablet TAKE 1/2 TABLET BY MOUTH EVERY DAY 24   Santiago Tabor MD   potassium chloride (KLOR-CON M) 20 MEQ extended release tablet TAKE 1 TABLET BY MOUTH EVERY DAY 24   Santiago Tabor MD   spironolactone-hydroCHLOROthiazide (ALDACTAZIDE) 25-25 MG per tablet TAKE 1/2 TABLET BY MOUTH EVERY DAY 24   Patricia Murguia MD   pantoprazole (PROTONIX) 40 MG tablet TAKE 1 TABLET BY MOUTH EVERY DAY 24   Santiago Tabor MD   promethazine-dextromethorphan (PROMETHAZINE-DM) 6.25-15 MG/5ML syrup Take 5 mLs by mouth every 6 hours as needed for Cough 11/10/23   Santiago Tabor MD   vitamin C (ASCORBIC ACID) 500 MG tablet Take 1 tablet by mouth 2 times daily    Provider,

## 2024-08-14 NOTE — BRIEF OP NOTE
Brief Postoperative Note      Patient: Epi Franco  YOB: 1945  MRN: [id number]    Date of Procedure: 8/14/2024    Pre-Op Diagnosis Codes:      * Kidney stone [N20.0]    Post-Op Diagnosis: Same       Procedure(s):  Cystoscopy Left Ureteroscopy Laser Lithotripsy Basket Retrieval of Stone Fragments Left Ureteral Stent Placement    Surgeon(s):  Sloan Gar MD    Assistant:  * No surgical staff found *    Anesthesia: General    Estimated Blood Loss (mL): Minimal    Complications: None    Specimens:   * No specimens in log *    Implants:  Implant Name Type Inv. Item Serial No.  Lot No. LRB No. Used Action   STENT URET 6FR L26CM HYDR+ PGTL TAPR TIP GRAD BLDR MRK LO - [id number]  STENT URET 6FR L26CM HYDR+ PGTL TAPR TIP GRAD BLDR MRK LO  [employer] [facility]- [id number] N/A 1 Implanted         Drains: * No LDAs found *    Findings:  Stone basketed and lasered. Pull stent five days. F/u kiko 2 week POC. Restart all blood thinners    Electronically signed by SLOAN GAR MD on 8/14/2024 at 11:38 AM

## 2024-08-16 ENCOUNTER — TELEPHONE (OUTPATIENT)
Dept: UROLOGY | Age: 79
End: 2024-08-16

## 2024-08-16 ENCOUNTER — HOSPITAL ENCOUNTER (INPATIENT)
Age: 79
LOS: 2 days | Discharge: HOME | DRG: 660 | End: 2024-08-18
Attending: STUDENT IN AN ORGANIZED HEALTH CARE EDUCATION/TRAINING PROGRAM
Payer: MEDICARE

## 2024-08-16 ENCOUNTER — TELEPHONE (OUTPATIENT)
Dept: FAMILY MEDICINE CLINIC | Age: 79
End: 2024-08-16

## 2024-08-16 ENCOUNTER — APPOINTMENT (OUTPATIENT)
Dept: CT IMAGING | Age: 79
DRG: 660 | End: 2024-08-16
Payer: MEDICARE

## 2024-08-16 DIAGNOSIS — R33.8 ACUTE URINARY RETENTION: ICD-10-CM

## 2024-08-16 DIAGNOSIS — R31.9 URINARY TRACT INFECTION WITH HEMATURIA, SITE UNSPECIFIED: ICD-10-CM

## 2024-08-16 DIAGNOSIS — G89.18 POSTOPERATIVE PAIN: ICD-10-CM

## 2024-08-16 DIAGNOSIS — N39.0 URINARY TRACT INFECTION WITH HEMATURIA, SITE UNSPECIFIED: ICD-10-CM

## 2024-08-16 DIAGNOSIS — R31.0 GROSS HEMATURIA: Primary | ICD-10-CM

## 2024-08-16 LAB
ALBUMIN SERPL BCG-MCNC: 3.7 G/DL (ref 3.5–5.1)
ALP SERPL-CCNC: 82 U/L (ref 38–126)
ALT SERPL W/O P-5'-P-CCNC: 63 U/L (ref 11–66)
AMORPH SED URNS QL MICRO: ABNORMAL
ANION GAP SERPL CALC-SCNC: 12 MEQ/L (ref 8–16)
APTT PPP: 35.8 SECONDS (ref 22–38)
AST SERPL-CCNC: 64 U/L (ref 5–40)
BACTERIA URNS QL MICRO: ABNORMAL /HPF
BASOPHILS ABSOLUTE: 0 THOU/MM3 (ref 0–0.1)
BASOPHILS NFR BLD AUTO: 0.4 %
BILIRUB SERPL-MCNC: 0.5 MG/DL (ref 0.3–1.2)
BILIRUB UR QL STRIP.AUTO: ABNORMAL
BUN SERPL-MCNC: 17 MG/DL (ref 7–22)
CALCIUM SERPL-MCNC: 8.9 MG/DL (ref 8.5–10.5)
CASTS #/AREA URNS LPF: ABNORMAL /LPF
CHARACTER UR: ABNORMAL
CHLORIDE SERPL-SCNC: 104 MEQ/L (ref 98–111)
CO2 SERPL-SCNC: 22 MEQ/L (ref 23–33)
COLOR, UA: ABNORMAL
CREAT SERPL-MCNC: 0.8 MG/DL (ref 0.4–1.2)
CRYSTALS URNS MICRO: ABNORMAL
DEPRECATED RDW RBC AUTO: 45.1 FL (ref 35–45)
EOSINOPHIL NFR BLD AUTO: 0.6 %
EOSINOPHILS ABSOLUTE: 0.1 THOU/MM3 (ref 0–0.4)
EPITHELIAL CELLS, UA: ABNORMAL /HPF
ERYTHROCYTE [DISTWIDTH] IN BLOOD BY AUTOMATED COUNT: 13.1 % (ref 11.5–14.5)
GFR SERPL CREATININE-BSD FRML MDRD: 89 ML/MIN/1.73M2
GLUCOSE SERPL-MCNC: 120 MG/DL (ref 70–108)
GLUCOSE UR QL STRIP.AUTO: 100 MG/DL
HCT VFR BLD AUTO: 39.5 % (ref 42–52)
HGB BLD-MCNC: 13.1 GM/DL (ref 14–18)
HGB UR QL STRIP.AUTO: ABNORMAL
IMM GRANULOCYTES # BLD AUTO: 0.03 THOU/MM3 (ref 0–0.07)
IMM GRANULOCYTES NFR BLD AUTO: 0.3 %
INR PPP: 1.6 (ref 0.85–1.13)
KETONES UR QL STRIP.AUTO: 15
LYMPHOCYTES ABSOLUTE: 0.9 THOU/MM3 (ref 1–4.8)
LYMPHOCYTES NFR BLD AUTO: 9.5 %
MCH RBC QN AUTO: 31.4 PG (ref 26–33)
MCHC RBC AUTO-ENTMCNC: 33.2 GM/DL (ref 32.2–35.5)
MCV RBC AUTO: 94.7 FL (ref 80–94)
MONOCYTES ABSOLUTE: 1 THOU/MM3 (ref 0.4–1.3)
MONOCYTES NFR BLD AUTO: 9.8 %
MUCOUS THREADS URNS QL MICRO: ABNORMAL
NEUTROPHILS ABSOLUTE: 7.8 THOU/MM3 (ref 1.8–7.7)
NEUTROPHILS NFR BLD AUTO: 79.4 %
NITRITE UR QL STRIP: POSITIVE
NRBC BLD AUTO-RTO: 0 /100 WBC
OSMOLALITY SERPL CALC.SUM OF ELEC: 278.4 MOSMOL/KG (ref 275–300)
PH UR STRIP.AUTO: 6.5 [PH] (ref 5–9)
PLATELET # BLD AUTO: 148 THOU/MM3 (ref 130–400)
PMV BLD AUTO: 10.3 FL (ref 9.4–12.4)
POTASSIUM SERPL-SCNC: 4 MEQ/L (ref 3.5–5.2)
PROT SERPL-MCNC: 6.1 G/DL (ref 6.1–8)
PROT UR STRIP.AUTO-MCNC: >= 300 MG/DL
RBC # BLD AUTO: 4.17 MILL/MM3 (ref 4.7–6.1)
RBC URINE: > 200 /HPF
SODIUM SERPL-SCNC: 138 MEQ/L (ref 135–145)
SP GR UR REFRACT.AUTO: 1.02 (ref 1–1.03)
UROBILINOGEN, URINE: 4 EU/DL (ref 0–1)
WBC # BLD AUTO: 9.8 THOU/MM3 (ref 4.8–10.8)
WBC #/AREA URNS HPF: ABNORMAL /HPF
WBC #/AREA URNS HPF: ABNORMAL /[HPF]

## 2024-08-16 PROCEDURE — 85610 PROTHROMBIN TIME: CPT

## 2024-08-16 PROCEDURE — 87086 URINE CULTURE/COLONY COUNT: CPT

## 2024-08-16 PROCEDURE — 85025 COMPLETE CBC W/AUTO DIFF WBC: CPT

## 2024-08-16 PROCEDURE — 96365 THER/PROPH/DIAG IV INF INIT: CPT

## 2024-08-16 PROCEDURE — 2580000003 HC RX 258

## 2024-08-16 PROCEDURE — 99285 EMERGENCY DEPT VISIT HI MDM: CPT

## 2024-08-16 PROCEDURE — 74176 CT ABD & PELVIS W/O CONTRAST: CPT

## 2024-08-16 PROCEDURE — 80053 COMPREHEN METABOLIC PANEL: CPT

## 2024-08-16 PROCEDURE — 51702 INSERT TEMP BLADDER CATH: CPT

## 2024-08-16 PROCEDURE — 99223 1ST HOSP IP/OBS HIGH 75: CPT | Performed by: STUDENT IN AN ORGANIZED HEALTH CARE EDUCATION/TRAINING PROGRAM

## 2024-08-16 PROCEDURE — 85730 THROMBOPLASTIN TIME PARTIAL: CPT

## 2024-08-16 PROCEDURE — 1200000003 HC TELEMETRY R&B

## 2024-08-16 PROCEDURE — 6360000002 HC RX W HCPCS

## 2024-08-16 PROCEDURE — 36415 COLL VENOUS BLD VENIPUNCTURE: CPT

## 2024-08-16 PROCEDURE — 6370000000 HC RX 637 (ALT 250 FOR IP)

## 2024-08-16 PROCEDURE — 51798 US URINE CAPACITY MEASURE: CPT

## 2024-08-16 PROCEDURE — 81001 URINALYSIS AUTO W/SCOPE: CPT

## 2024-08-16 PROCEDURE — 99221 1ST HOSP IP/OBS SF/LOW 40: CPT | Performed by: UROLOGY

## 2024-08-16 PROCEDURE — 6370000000 HC RX 637 (ALT 250 FOR IP): Performed by: STUDENT IN AN ORGANIZED HEALTH CARE EDUCATION/TRAINING PROGRAM

## 2024-08-16 RX ORDER — ONDANSETRON 2 MG/ML
4 INJECTION INTRAMUSCULAR; INTRAVENOUS EVERY 6 HOURS PRN
Status: DISCONTINUED | OUTPATIENT
Start: 2024-08-16 | End: 2024-08-18 | Stop reason: HOSPADM

## 2024-08-16 RX ORDER — TAMSULOSIN HYDROCHLORIDE 0.4 MG/1
0.4 CAPSULE ORAL DAILY
Status: DISCONTINUED | OUTPATIENT
Start: 2024-08-16 | End: 2024-08-18 | Stop reason: HOSPADM

## 2024-08-16 RX ORDER — ASCORBIC ACID 500 MG
500 TABLET ORAL 2 TIMES DAILY
Status: DISCONTINUED | OUTPATIENT
Start: 2024-08-17 | End: 2024-08-18 | Stop reason: HOSPADM

## 2024-08-16 RX ORDER — SODIUM CHLORIDE 9 MG/ML
INJECTION, SOLUTION INTRAVENOUS PRN
Status: DISCONTINUED | OUTPATIENT
Start: 2024-08-16 | End: 2024-08-18 | Stop reason: HOSPADM

## 2024-08-16 RX ORDER — SPIRONOLACTONE AND HYDROCHLOROTHIAZIDE 25; 25 MG/1; MG/1
0.5 TABLET ORAL DAILY
Status: DISCONTINUED | OUTPATIENT
Start: 2024-08-16 | End: 2024-08-16

## 2024-08-16 RX ORDER — ATORVASTATIN CALCIUM 80 MG/1
80 TABLET, FILM COATED ORAL DAILY
Status: DISCONTINUED | OUTPATIENT
Start: 2024-08-17 | End: 2024-08-18 | Stop reason: HOSPADM

## 2024-08-16 RX ORDER — ASCORBIC ACID 500 MG
1000 TABLET ORAL DAILY
Status: DISCONTINUED | OUTPATIENT
Start: 2024-08-17 | End: 2024-08-16

## 2024-08-16 RX ORDER — SODIUM CHLORIDE 0.9 % (FLUSH) 0.9 %
5-40 SYRINGE (ML) INJECTION EVERY 12 HOURS SCHEDULED
Status: DISCONTINUED | OUTPATIENT
Start: 2024-08-16 | End: 2024-08-18 | Stop reason: HOSPADM

## 2024-08-16 RX ORDER — OXYCODONE AND ACETAMINOPHEN 5; 325 MG/1; MG/1
2 TABLET ORAL EVERY 4 HOURS PRN
Status: DISCONTINUED | OUTPATIENT
Start: 2024-08-16 | End: 2024-08-18 | Stop reason: HOSPADM

## 2024-08-16 RX ORDER — PANTOPRAZOLE SODIUM 40 MG/1
40 TABLET, DELAYED RELEASE ORAL DAILY
Status: DISCONTINUED | OUTPATIENT
Start: 2024-08-17 | End: 2024-08-18 | Stop reason: HOSPADM

## 2024-08-16 RX ORDER — SODIUM CHLORIDE 0.9 % (FLUSH) 0.9 %
5-40 SYRINGE (ML) INJECTION PRN
Status: DISCONTINUED | OUTPATIENT
Start: 2024-08-16 | End: 2024-08-18 | Stop reason: HOSPADM

## 2024-08-16 RX ORDER — LIDOCAINE HYDROCHLORIDE 20 MG/ML
JELLY TOPICAL ONCE
Status: COMPLETED | OUTPATIENT
Start: 2024-08-16 | End: 2024-08-16

## 2024-08-16 RX ORDER — ACETAMINOPHEN 325 MG/1
650 TABLET ORAL EVERY 6 HOURS PRN
Status: DISCONTINUED | OUTPATIENT
Start: 2024-08-16 | End: 2024-08-18 | Stop reason: HOSPADM

## 2024-08-16 RX ORDER — POLYETHYLENE GLYCOL 3350 17 G/17G
17 POWDER, FOR SOLUTION ORAL DAILY PRN
Status: DISCONTINUED | OUTPATIENT
Start: 2024-08-16 | End: 2024-08-18 | Stop reason: HOSPADM

## 2024-08-16 RX ORDER — HYDROCHLOROTHIAZIDE 12.5 MG/1
12.5 CAPSULE ORAL DAILY
Status: DISCONTINUED | OUTPATIENT
Start: 2024-08-17 | End: 2024-08-18 | Stop reason: HOSPADM

## 2024-08-16 RX ORDER — ONDANSETRON 4 MG/1
4 TABLET, ORALLY DISINTEGRATING ORAL EVERY 8 HOURS PRN
Status: DISCONTINUED | OUTPATIENT
Start: 2024-08-16 | End: 2024-08-18 | Stop reason: HOSPADM

## 2024-08-16 RX ORDER — SPIRONOLACTONE 25 MG/1
12.5 TABLET ORAL DAILY
Status: DISCONTINUED | OUTPATIENT
Start: 2024-08-17 | End: 2024-08-18 | Stop reason: HOSPADM

## 2024-08-16 RX ORDER — ACETAMINOPHEN 650 MG/1
650 SUPPOSITORY RECTAL EVERY 6 HOURS PRN
Status: DISCONTINUED | OUTPATIENT
Start: 2024-08-16 | End: 2024-08-18 | Stop reason: HOSPADM

## 2024-08-16 RX ORDER — MULTIVITAMIN WITH IRON
1 TABLET ORAL DAILY
Status: DISCONTINUED | OUTPATIENT
Start: 2024-08-17 | End: 2024-08-18 | Stop reason: HOSPADM

## 2024-08-16 RX ORDER — OXYCODONE AND ACETAMINOPHEN 5; 325 MG/1; MG/1
1 TABLET ORAL EVERY 4 HOURS PRN
Status: DISCONTINUED | OUTPATIENT
Start: 2024-08-16 | End: 2024-08-18 | Stop reason: HOSPADM

## 2024-08-16 RX ORDER — FOLIC ACID 1 MG/1
1000 TABLET ORAL DAILY
Status: DISCONTINUED | OUTPATIENT
Start: 2024-08-17 | End: 2024-08-18 | Stop reason: HOSPADM

## 2024-08-16 RX ORDER — OXYBUTYNIN CHLORIDE 10 MG/1
10 TABLET, EXTENDED RELEASE ORAL DAILY
Status: DISCONTINUED | OUTPATIENT
Start: 2024-08-16 | End: 2024-08-18 | Stop reason: HOSPADM

## 2024-08-16 RX ORDER — VITAMIN B COMPLEX
2000 TABLET ORAL DAILY
Status: DISCONTINUED | OUTPATIENT
Start: 2024-08-17 | End: 2024-08-18 | Stop reason: HOSPADM

## 2024-08-16 RX ORDER — OMEGA-3-ACID ETHYL ESTERS 1 G/1
1000 CAPSULE, LIQUID FILLED ORAL DAILY
Status: DISCONTINUED | OUTPATIENT
Start: 2024-08-17 | End: 2024-08-18 | Stop reason: HOSPADM

## 2024-08-16 RX ORDER — METOPROLOL SUCCINATE 25 MG/1
12.5 TABLET, EXTENDED RELEASE ORAL DAILY
Status: DISCONTINUED | OUTPATIENT
Start: 2024-08-17 | End: 2024-08-18 | Stop reason: HOSPADM

## 2024-08-16 RX ADMIN — SODIUM CHLORIDE, PRESERVATIVE FREE 10 ML: 5 INJECTION INTRAVENOUS at 21:50

## 2024-08-16 RX ADMIN — OXYCODONE HYDROCHLORIDE AND ACETAMINOPHEN 1 TABLET: 5; 325 TABLET ORAL at 23:18

## 2024-08-16 RX ADMIN — CEFTRIAXONE SODIUM 1000 MG: 1 INJECTION, POWDER, FOR SOLUTION INTRAMUSCULAR; INTRAVENOUS at 14:15

## 2024-08-16 RX ADMIN — LIDOCAINE HYDROCHLORIDE: 20 JELLY TOPICAL at 12:05

## 2024-08-16 ASSESSMENT — PAIN - FUNCTIONAL ASSESSMENT
PAIN_FUNCTIONAL_ASSESSMENT: NONE - DENIES PAIN
PAIN_FUNCTIONAL_ASSESSMENT: 0-10
PAIN_FUNCTIONAL_ASSESSMENT: NONE - DENIES PAIN
PAIN_FUNCTIONAL_ASSESSMENT: NONE - DENIES PAIN

## 2024-08-16 ASSESSMENT — PAIN SCALES - GENERAL
PAINLEVEL_OUTOF10: 9
PAINLEVEL_OUTOF10: 3

## 2024-08-16 ASSESSMENT — PAIN DESCRIPTION - LOCATION: LOCATION: BACK

## 2024-08-16 ASSESSMENT — PAIN DESCRIPTION - DESCRIPTORS: DESCRIPTORS: ACHING

## 2024-08-16 NOTE — H&P
History & Physical  Internal Medicine Resident         Patient: Epi Franco 79 y.o. male      : 1945  Date of Admission: 2024  Date of Service: Pt seen/examined on 24 and Admitted to Inpatient with expected LOS greater than two midnights due to medical therapy.       ASSESSMENT AND PLAN  New onset gross hematuria: He had left-sided hydronephrosis and hydroureter secondary to a 4.8 mm stone at the left ureterovesical junction for which he underwent cystoscopy, left ureteroscopy and laser lithotripsy basket retrieval and stent placement 2 days ago on 2024. Discharged home with a string to be pulled on the stent in a few days. Started peeing out blood and since last night he has been unable to urinate he presents complaining of lower abdominal distention with severe abdominal pain.  Continues to be doing better and not complaining of abdominal pain, since catheterization.  H and H is stable. Bladder irrigation ordered, CT showed stable stent with stable hydronephrosis and no acute changes.CT does not show any mass or any fluid. Patient does not have fever, chills, malaise or extreme weight loss to suggest malignancy. Urology to follow up with the procedure tomorrow. Zofran and Percocet ordered if needed.   Urinary Tract Infection: Urinalysis showed presence of UTI with moderate leukocyte esterase and positive nitrite.  Due to him being a male and recent instrumentation, this is a complicated UTI.  Patient started on Rocephin for 5 days inpatient.   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  Patient was recently bridging to Coumadin and taking Lovenox.  Due to recent developments, urology has suggested to stop anticoagulation medications held for now.  Patient does not have any chest pain or shortness of breath, there is no evidence of leg edema and can ambulate well.  Patient saturating at 99%  GERD: Patient reflux

## 2024-08-16 NOTE — ED PROVIDER NOTES
[facility] EMERGENCY DEPT  EMERGENCY DEPARTMENT ENCOUNTER          Pt Name: Epi Franco  MRN: [id number]  Birthdate 1945  Date of evaluation: 8/16/2024  Physician: Ozzy Kilgore MD  Supervising Attending Physician: Iglesia Kimble MD       CHIEF COMPLAINT       Chief Complaint   Patient presents with    Hematuria    Urinary Retention     Bladder Scan 707         HISTORY OF PRESENT ILLNESS    HPI  Epi Franco is a 79 y.o. 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  He had a kidney stone underwent lithotripsy and stent placement 2 days ago.  Discharged home with a string to be pulled on the stent in a few days.  Started peeing out blood and since last night he has been unable to urinate he presents complaining of lower abdominal distention with severe abdominal pain.  Bladder scan shows 700 mL of urine, he has been having hematuria with some clots.  He is currently on Lovenox and bridging back on his Coumadin.  The patient has no other acute complaints at this time.      REVIEW OF SYSTEMS   Review of Systems      PAST MEDICAL AND SURGICAL HISTORY     Past Medical History:   Diagnosis Date    CAD (coronary artery disease) 01/2006    cath with stent lad    Chronic back pain 2013      epidural block    Colon polyps 10/2005    Diverticulosis of colon     DVT of proximal leg (deep vein thrombosis) ([facility]) 12/04 2013    left     right  in 2013    DVT of proximal leg (deep vein thrombosis) ([facility])     left     GERD (gastroesophageal reflux disease)     Kidney stone on left side 12/2020    left  side  in  2009  also    Pulmonary embolus ([facility]) 6-2013 and  1-2015     had   right  leg  dvt    S/P cardiac cath 2014 at [facility]- patent LAD stent done 2006 and 40 to 50% lesions in other coronaries 04/12/2019    S/P cardiac cath  EVERY DAY, Disp: 90 tablet, Rfl: 1    folic acid (FOLVITE) 1 MG tablet, TAKE 1 TABLET BY MOUTH EVERY DAY, Disp: 90 tablet, Rfl: 1    ondansetron (ZOFRAN-ODT) 4 MG disintegrating tablet, Take 1 tablet by mouth every 8 hours as needed for Nausea or Vomiting, Disp: 20 tablet, Rfl: 0    warfarin (JANTOVEN) 5 MG tablet, TAKE by mouth AS DIRECTED BY COUMADIN CLINIC, Disp: 90 tablet, Rfl: 0    metoprolol succinate (TOPROL XL) 25 MG extended release tablet, TAKE 1/2 TABLET BY MOUTH EVERY DAY, Disp: 45 tablet, Rfl: 1    potassium chloride (KLOR-CON M) 20 MEQ extended release tablet, TAKE 1 TABLET BY MOUTH EVERY DAY, Disp: 90 tablet, Rfl: 1    spironolactone-hydroCHLOROthiazide (ALDACTAZIDE) 25-25 MG per tablet, TAKE 1/2 TABLET BY MOUTH EVERY DAY, Disp: 45 tablet, Rfl: 3    pantoprazole (PROTONIX) 40 MG tablet, TAKE 1 TABLET BY MOUTH EVERY DAY, Disp: 90 tablet, Rfl: 1    promethazine-dextromethorphan (PROMETHAZINE-DM) 6.25-15 MG/5ML syrup, Take 5 mLs by mouth every 6 hours as needed for Cough, Disp: 240 mL, Rfl: 1    vitamin C (ASCORBIC ACID) 500 MG tablet, Take 1 tablet by mouth 2 times daily, Disp: , Rfl:     Cholecalciferol (VITAMIN D3) 50 MCG (2000 UT) CAPS, Take 1 capsule by mouth daily, Disp: , Rfl:     tiZANidine HCl (ZANAFLEX PO), Take by mouth, Disp: , Rfl:     clotrimazole-betamethasone (LOTRISONE) 1-0.05 % cream, Apply topically 2 times daily. Lotrisone 1-0.05% Cream, Disp: 45 g, Rfl: 3    nitroGLYCERIN (NITROSTAT) 0.4 MG SL tablet, up to max of 3 total doses. If no relief after 1 dose, call 911., Disp: 25 tablet, Rfl: 3    Ascorbic Acid (VITAMIN C) 1000 MG tablet, Take 1 tablet by mouth daily, Disp: , Rfl:     Multiple Vitamins-Minerals (MULTI COMPLETE PO), Take 1 tablet by mouth daily, Disp: , Rfl:     Omega-3 Fatty Acids (FISH OIL) 1000 MG CAPS, Take 3 capsules by mouth daily, Disp: , Rfl:     aspirin 81 MG EC tablet, Take 1 tablet by mouth daily, Disp: , Rfl:     Previous Medications    ASCORBIC ACID (VITAMIN

## 2024-08-16 NOTE — ED TRIAGE NOTES
Lithotripsy and left ureter stent placed on Wednesday. States he has had blood in his urine since the procedure, but he has not been able to urinate since yesterday afternoon. Reports he has not noticed any clots, but he has also not been paying attention. States he currently is wearing a depend d/t the fact that he can feel himself leaking blood. Denies any fevers or trauma or urethra. Denies any chest pain or dizziness.

## 2024-08-16 NOTE — ED NOTES
Pt is resting in bed and asks for water. Pt is breathing regular and unlabored on room air. Pt has no signs of distress to note at this time. Call light within reach.

## 2024-08-16 NOTE — CONSULTS
[facility] EMERGENCY DEPT  [address] [address]  Dept: [phone]  Loc: [phone]  Visit Date: 8/16/2024    Urology Consult Note    Reason for Consult:  urinary retention, hematuria  Requesting Physician:  ER    History Obtained From:  patient, electronic medical record    Chief Complaint: hematuria    HISTORY OF PRESENT ILLNESS:                The patient is a 79 y.o. male with significant past medical history of see below who presents with hematuria, retention  S/p works 8/14  Has string stent in place  Began bleeding immediately after surgery      Past Medical History:        Diagnosis Date    CAD (coronary artery disease) 01/2006    cath with stent lad    Chronic back pain 2013      epidural block    Colon polyps 10/2005    Diverticulosis of colon     DVT of proximal leg (deep vein thrombosis) ([facility]) 12/04 2013    left     right  in 2013    DVT of proximal leg (deep vein thrombosis) ([facility])     left     GERD (gastroesophageal reflux disease)     Kidney stone on left side 12/2020    left  side  in  2009  also    Pulmonary embolus ([facility]) 6-2013 and  1-2015     had   right  leg  dvt    S/P cardiac cath 2014 at [facility]- patent LAD stent done 2006 and 40 to 50% lesions in other coronaries 04/12/2019    S/P cardiac cath 4/17/19- large all ectatic vessel, lm-p, lad prox 40%, mid 50%, distal 40 distal stent -patent, lcx prox 40%, rca prox 30%, dist 40%, edp 11, ef 50%- med  RX 04/17/2019    SOB (shortness of breath) on exertion 06/08/2020    Unspecified sleep apnea 2013    cpap     Past Surgical History:        Procedure Laterality Date    BACK SURGERY  5-    Dr Mayen @ [facility])     CARDIAC CATHETERIZATION  2014       50%  lesion    bamdad    CARDIAC CATHETERIZATION  04/2019      dr lam  and  diffuse  disease  30 to 50%    COLONOSCOPY  2012 2020    sheik    polyps   due   2025    CORONARY ANGIOPLASTY WITH STENT PLACEMENT  2006

## 2024-08-16 NOTE — TELEPHONE ENCOUNTER
Pt called stating he had outpatient surgery on Wednesday to remove an stone and now is having trouble urinating I informed pt to call the surgeons office an dif he can not get through to call us back

## 2024-08-16 NOTE — TELEPHONE ENCOUNTER
Cystoscopy Left Ureteroscopy Laser Lithotripsy Basket Retrieval of Stone Fragments Left Ureteral Stent Placement 08/14/2024    Patient is not able to urinate. He has not voided well since yesterday. He has pain at the end of the penis.    Patient unable to come to the office due to not being able to walk to a car. Advised to call the EMS and be evaluated in the ER. He voiced understanding.

## 2024-08-16 NOTE — ED NOTES
ED to inpatient nurses report      Chief Complaint:  Chief Complaint   Patient presents with    Hematuria    Urinary Retention     Bladder Scan 707     Present to ED from: home    MOA:     LOC: alert and orientated to name, place, date  Mobility: Independent  Oxygen Baseline: 98%    Current needs required: none    Code Status:   Prior    What abnormal results were found and what did you give/do to treat them?   Urinary retention. Urinary catheter placed.   Any procedures or intervention occur?   CBI administered.    Mental Status:  Level of Consciousness: Alert (0)    Psych Assessment:        Vitals:  Patient Vitals for the past 24 hrs:   BP Temp Temp src Pulse Resp SpO2   08/16/24 1730 112/68 -- -- -- -- 98 %   08/16/24 1531 106/69 -- -- 57 18 99 %   08/16/24 1422 115/69 -- -- 66 18 96 %   08/16/24 1406 100/60 -- -- 63 18 97 %   08/16/24 1235 110/66 -- -- 56 18 97 %   08/16/24 1205 107/76 -- -- -- -- 96 %   08/16/24 1042 (!) 159/77 98.1 °F (36.7 °C) Oral 68 18 97 %        LDAs:   Peripheral IV 08/16/24 Left Forearm (Active)   Site Assessment Clean, dry & intact 08/16/24 1414   Line Status Infusing 08/16/24 1414   Dressing Status Clean, dry & intact 08/16/24 1414   Dressing Type Transparent 08/16/24 1414   Dressing Intervention New 08/16/24 1414       Ambulatory Status:  No data recorded    Diagnosis:  DISPOSITION Admitted 08/16/2024 05:59:27 PM   Final diagnoses:   Gross hematuria   Urinary tract infection with hematuria, site unspecified        Consults:  IP CONSULT TO UROLOGY     Pain Score:  Pain Assessment  Pain Assessment: None - Denies Pain  Pain Level: 9    C-SSRS:        Sepsis Screening:       [place] Fall Risk:       Swallow Screening        Preferred Language:   English      ALLERGIES     Patient has no known allergies.    SURGICAL HISTORY       Past Surgical History:   Procedure Laterality Date    BACK SURGERY  5-    Dr Mayen @ [facility] ([facility])     CARDIAC CATHETERIZATION  2014

## 2024-08-17 ENCOUNTER — TELEPHONE (OUTPATIENT)
Dept: UROLOGY | Age: 79
End: 2024-08-17

## 2024-08-17 DIAGNOSIS — N20.1 LEFT URETERAL CALCULUS: Primary | ICD-10-CM

## 2024-08-17 LAB
BASOPHILS ABSOLUTE: 0 THOU/MM3 (ref 0–0.1)
BASOPHILS NFR BLD AUTO: 0.4 %
DEPRECATED RDW RBC AUTO: 45.2 FL (ref 35–45)
EOSINOPHIL NFR BLD AUTO: 2.2 %
EOSINOPHILS ABSOLUTE: 0.2 THOU/MM3 (ref 0–0.4)
ERYTHROCYTE [DISTWIDTH] IN BLOOD BY AUTOMATED COUNT: 13.2 % (ref 11.5–14.5)
HCT VFR BLD AUTO: 38.5 % (ref 42–52)
HGB BLD-MCNC: 13.1 GM/DL (ref 14–18)
IMM GRANULOCYTES # BLD AUTO: 0.04 THOU/MM3 (ref 0–0.07)
IMM GRANULOCYTES NFR BLD AUTO: 0.4 %
LYMPHOCYTES ABSOLUTE: 1.6 THOU/MM3 (ref 1–4.8)
LYMPHOCYTES NFR BLD AUTO: 15.5 %
MCH RBC QN AUTO: 32 PG (ref 26–33)
MCHC RBC AUTO-ENTMCNC: 34 GM/DL (ref 32.2–35.5)
MCV RBC AUTO: 94.1 FL (ref 80–94)
MONOCYTES ABSOLUTE: 1.7 THOU/MM3 (ref 0.4–1.3)
MONOCYTES NFR BLD AUTO: 16.4 %
NEUTROPHILS ABSOLUTE: 6.6 THOU/MM3 (ref 1.8–7.7)
NEUTROPHILS NFR BLD AUTO: 65.1 %
NRBC BLD AUTO-RTO: 0 /100 WBC
PLATELET # BLD AUTO: 142 THOU/MM3 (ref 130–400)
PMV BLD AUTO: 10.3 FL (ref 9.4–12.4)
RBC # BLD AUTO: 4.09 MILL/MM3 (ref 4.7–6.1)
WBC # BLD AUTO: 10.1 THOU/MM3 (ref 4.8–10.8)

## 2024-08-17 PROCEDURE — 36415 COLL VENOUS BLD VENIPUNCTURE: CPT

## 2024-08-17 PROCEDURE — 1200000003 HC TELEMETRY R&B

## 2024-08-17 PROCEDURE — 99233 SBSQ HOSP IP/OBS HIGH 50: CPT | Performed by: PHYSICIAN ASSISTANT

## 2024-08-17 PROCEDURE — 6360000002 HC RX W HCPCS

## 2024-08-17 PROCEDURE — 6360000002 HC RX W HCPCS: Performed by: PHYSICIAN ASSISTANT

## 2024-08-17 PROCEDURE — 99232 SBSQ HOSP IP/OBS MODERATE 35: CPT

## 2024-08-17 PROCEDURE — 6370000000 HC RX 637 (ALT 250 FOR IP)

## 2024-08-17 PROCEDURE — 2580000003 HC RX 258

## 2024-08-17 PROCEDURE — 85025 COMPLETE CBC W/AUTO DIFF WBC: CPT

## 2024-08-17 PROCEDURE — 6370000000 HC RX 637 (ALT 250 FOR IP): Performed by: PHYSICIAN ASSISTANT

## 2024-08-17 RX ORDER — WARFARIN SODIUM 7.5 MG/1
7.5 TABLET ORAL ONCE
Status: COMPLETED | OUTPATIENT
Start: 2024-08-17 | End: 2024-08-17

## 2024-08-17 RX ORDER — ENOXAPARIN SODIUM 100 MG/ML
1 INJECTION SUBCUTANEOUS DAILY
Status: DISCONTINUED | OUTPATIENT
Start: 2024-08-17 | End: 2024-08-17 | Stop reason: DRUGHIGH

## 2024-08-17 RX ORDER — ENOXAPARIN SODIUM 100 MG/ML
1 INJECTION SUBCUTANEOUS 2 TIMES DAILY
Status: DISCONTINUED | OUTPATIENT
Start: 2024-08-17 | End: 2024-08-18 | Stop reason: HOSPADM

## 2024-08-17 RX ADMIN — PANTOPRAZOLE SODIUM 40 MG: 40 TABLET, DELAYED RELEASE ORAL at 09:23

## 2024-08-17 RX ADMIN — SODIUM CHLORIDE, PRESERVATIVE FREE 10 ML: 5 INJECTION INTRAVENOUS at 09:22

## 2024-08-17 RX ADMIN — CEFTRIAXONE SODIUM 1000 MG: 1 INJECTION, POWDER, FOR SOLUTION INTRAMUSCULAR; INTRAVENOUS at 14:57

## 2024-08-17 RX ADMIN — OXYCODONE HYDROCHLORIDE AND ACETAMINOPHEN 500 MG: 500 TABLET ORAL at 09:23

## 2024-08-17 RX ADMIN — SODIUM CHLORIDE: 9 INJECTION, SOLUTION INTRAVENOUS at 14:54

## 2024-08-17 RX ADMIN — ENOXAPARIN SODIUM 100 MG: 100 INJECTION SUBCUTANEOUS at 19:55

## 2024-08-17 RX ADMIN — FOLIC ACID 1000 MCG: 1 TABLET ORAL at 09:23

## 2024-08-17 RX ADMIN — Medication 1 TABLET: at 09:23

## 2024-08-17 RX ADMIN — METOPROLOL SUCCINATE 12.5 MG: 25 TABLET, FILM COATED, EXTENDED RELEASE ORAL at 09:30

## 2024-08-17 RX ADMIN — OXYCODONE HYDROCHLORIDE AND ACETAMINOPHEN 500 MG: 500 TABLET ORAL at 19:55

## 2024-08-17 RX ADMIN — ATORVASTATIN CALCIUM 80 MG: 80 TABLET, FILM COATED ORAL at 09:23

## 2024-08-17 RX ADMIN — OMEGA-3-ACID ETHYL ESTERS 1000 MG: 1 CAPSULE, LIQUID FILLED ORAL at 09:23

## 2024-08-17 RX ADMIN — SODIUM CHLORIDE, PRESERVATIVE FREE 10 ML: 5 INJECTION INTRAVENOUS at 19:55

## 2024-08-17 RX ADMIN — WARFARIN SODIUM 7.5 MG: 7.5 TABLET ORAL at 18:00

## 2024-08-17 RX ADMIN — Medication 2000 UNITS: at 09:22

## 2024-08-17 ASSESSMENT — PAIN SCALES - GENERAL: PAINLEVEL_OUTOF10: 0

## 2024-08-17 NOTE — PROGRESS NOTES
[facility]  STRZ RENAL TELEMETRY 6K  [address] [facility] [address]  Dept: [phone]  Loc: [phone]  Visit Date: 2024    Urology Progress Note    Chief Complaint: Urinary Retention, Hematuria    Impression/Plan:  Gross Hematuria   Urinary Retention  - S/P Cystoscopy, Left Works by Christie on . Stent remains in place and is due to be pulled on .  - Patient presented in urinary retention 2/ to gross hematuria with clots. On Lovenox Bridge to Coumadin for DVT's.  - 3-Way placed in the ED and our rounder MELI Leroy-CNP irrigated with 700 cc NS to light pink last evening with many small to medium sized clots out. CBI started and titrated to pink.  - Urine is light yellow this AM, pink tinged with CBI running slowly. Can clamp CBI at noon and if urine remains clear patient is okay for D/C from URO standpoint.  - Urine culture pending but showing no-growth preliminarily. If sending home, can switch to oral Ciprofloxacin 500 mg BID x 5 days at discharge.   - Patient is apprehensive regarding removing his ureteral stent. Will schedule to be done Monday morning in our office.      Pertinent Urology Labs  Hgb- 13.1 (stable vs yesterday)  WBC- 10.1  CRT- 0.8        Vitals:  BP (!) 104/58   Pulse 61   Temp 98.4 °F (36.9 °C) (Oral)   Resp 16   Ht 1.753 m (5' 9\")   Wt 100 kg (220 lb 7.4 oz)   SpO2 99%   BMI 32.56 kg/m²   Temp  Av.4 °F (36.9 °C)  Min: 97.7 °F (36.5 °C)  Max: 99.4 °F (37.4 °C)    Intake/Output Summary (Last 24 hours) at 2024 1007  Last data filed at 2024 0922  Gross per 24 hour   Intake 10 ml   Output 2200 ml   Net -2190 ml       Social History     Socioeconomic History    Marital status:      Spouse name: Not on file    Number of children: Not on file    Years of education: Not on file    Highest education level: Not on file   Occupational History    Not on file   Tobacco Use    Smoking status: Never     Passive

## 2024-08-17 NOTE — PROGRESS NOTES
Hospitalist Progress Note      Patient:  Epi Franco 79 y.o. male     Unit/Bed:-27/027-A    Date of Admission: 8/16/2024      ASSESSMENT AND PLAN    Active Problems  Acute urinary retention  Acute gross hematuria following cystoscopy, left ureteroscopy and laser lithotripsy with basket retrieval and stent placement 8/14/2024  CT A/P was reviewed which shows presence of stent in the appropriate position, unchanged hydronephrosis/hydroureter nephrosis  Urology consulted and following  Patient was placed on CBI 8/16, this has been clamped today and urine has remained pale candelaria  Continue to monitor overnight and likely discharge tomorrow, okay with urology to resume OAC  Patient to be discharged with a string stent to be pulled in a few days  Urology office is coordinating to pull this Monday 8/19 due to patient's apprehension of doing this himself  S/p Johnston catheter placement in the ED  Will confirm with urology whether this can be removed or needs to remain at discharge    Acute complicated UTI  UA with moderate LE, positive nitrites  With recent instrumentation/patient is a male, will treat for 7-day total  Urology recommends changing over to oral Cipro  Urine culture reviewed with no growth preliminary    Acute macrocytic anemia  Likely related to some acute blood loss from above  Hemoglobin was 15.2 7/2024, has been stable in the 13 range during this admission  Repeat CBC in the a.m.  Check vitamin B12 and folate in the am    Resolved Problems  Constipation    Chronic Conditions (reviewed and stable unless otherwise stated)   History of DVTs  Patient takes Coumadin at home, he was bridging with Lovenox prior to this hospitalization  Pharmacy consulted to dose Coumadin  Monitor daily INR (was 1.60 yesterday)  GERD  FABRICE on CPAP  Essential hypertension  Hyperlipidemia        LDA: []CVC / []PICC / []Midline / [x]Johnston / []Drains / []Mediport / []None  Antibiotics: Rocephin  Steroids:   Labs (still

## 2024-08-17 NOTE — PROGRESS NOTES
Warfarin Pharmacy Consult Note    Epi Franco is a 79 y.o. male for whom pharmacy has been asked to manage warfarin therapy.     Consulting Provider: Savita Fay  Warfarin Indication: Hx of DVT & PE  Target INR range: 2.0-3.0   Warfarin dose prior to admission: 5 mg daily  Outpatient warfarin provider: [facility]    Recent Labs     08/16/24  1231 08/17/24  0602   HGB 13.1* 13.1*    142     Recent Labs     08/16/24  1231   INR 1.60*     Concurrent anticoagulants/antiplatelets: enoxaparin 100mg bid,   Significant warfarin drug-drug interactions: none    Date INR Warfarin Dose   8/17/24 1.6 (8/16) 7.5 mg                                   INR will be monitored routinely until therapeutic INR is achieved.    Pharmacy will continue to follow. Thank you for the consult,   Whitney MARROQUINPh., BCPS., 8/17/2024,4:39 PM

## 2024-08-17 NOTE — ED NOTES
Pt transported in bed to 6K room 027. Nurse informed prior to taking Pt up in a stable condition.

## 2024-08-18 VITALS
TEMPERATURE: 97.6 F | RESPIRATION RATE: 18 BRPM | DIASTOLIC BLOOD PRESSURE: 59 MMHG | SYSTOLIC BLOOD PRESSURE: 109 MMHG | OXYGEN SATURATION: 95 % | WEIGHT: 220.46 LBS | HEIGHT: 69 IN | BODY MASS INDEX: 32.65 KG/M2 | HEART RATE: 72 BPM

## 2024-08-18 LAB
BACTERIA UR CULT: NORMAL
DEPRECATED RDW RBC AUTO: 46.2 FL (ref 35–45)
ERYTHROCYTE [DISTWIDTH] IN BLOOD BY AUTOMATED COUNT: 13.2 % (ref 11.5–14.5)
FOLATE SERPL-MCNC: > 20 NG/ML (ref 4.8–24.2)
HCT VFR BLD AUTO: 39.9 % (ref 42–52)
HGB BLD-MCNC: 13.6 GM/DL (ref 14–18)
INR PPP: 1.82 (ref 0.85–1.13)
MCH RBC QN AUTO: 32.5 PG (ref 26–33)
MCHC RBC AUTO-ENTMCNC: 34.1 GM/DL (ref 32.2–35.5)
MCV RBC AUTO: 95.2 FL (ref 80–94)
PLATELET # BLD AUTO: 152 THOU/MM3 (ref 130–400)
PMV BLD AUTO: 10.2 FL (ref 9.4–12.4)
RBC # BLD AUTO: 4.19 MILL/MM3 (ref 4.7–6.1)
VIT B12 SERPL-MCNC: 558 PG/ML (ref 211–911)
WBC # BLD AUTO: 8.6 THOU/MM3 (ref 4.8–10.8)

## 2024-08-18 PROCEDURE — 85610 PROTHROMBIN TIME: CPT

## 2024-08-18 PROCEDURE — 2580000003 HC RX 258

## 2024-08-18 PROCEDURE — 85027 COMPLETE CBC AUTOMATED: CPT

## 2024-08-18 PROCEDURE — 6370000000 HC RX 637 (ALT 250 FOR IP): Performed by: PHARMACIST

## 2024-08-18 PROCEDURE — 6370000000 HC RX 637 (ALT 250 FOR IP)

## 2024-08-18 PROCEDURE — 6370000000 HC RX 637 (ALT 250 FOR IP): Performed by: PHYSICIAN ASSISTANT

## 2024-08-18 PROCEDURE — 82607 VITAMIN B-12: CPT

## 2024-08-18 PROCEDURE — 93005 ELECTROCARDIOGRAM TRACING: CPT | Performed by: PHYSICIAN ASSISTANT

## 2024-08-18 PROCEDURE — 82746 ASSAY OF FOLIC ACID SERUM: CPT

## 2024-08-18 PROCEDURE — 6360000002 HC RX W HCPCS: Performed by: PHYSICIAN ASSISTANT

## 2024-08-18 PROCEDURE — 99232 SBSQ HOSP IP/OBS MODERATE 35: CPT

## 2024-08-18 PROCEDURE — 36415 COLL VENOUS BLD VENIPUNCTURE: CPT

## 2024-08-18 RX ORDER — CIPROFLOXACIN 500 MG/1
500 TABLET, FILM COATED ORAL 2 TIMES DAILY
Qty: 10 TABLET | Refills: 0 | Status: SHIPPED | OUTPATIENT
Start: 2024-08-18 | End: 2024-08-23

## 2024-08-18 RX ORDER — WARFARIN SODIUM 5 MG/1
5 TABLET ORAL ONCE
Status: COMPLETED | OUTPATIENT
Start: 2024-08-18 | End: 2024-08-18

## 2024-08-18 RX ADMIN — ATORVASTATIN CALCIUM 80 MG: 80 TABLET, FILM COATED ORAL at 08:26

## 2024-08-18 RX ADMIN — SODIUM CHLORIDE, PRESERVATIVE FREE 10 ML: 5 INJECTION INTRAVENOUS at 08:28

## 2024-08-18 RX ADMIN — FOLIC ACID 1000 MCG: 1 TABLET ORAL at 08:26

## 2024-08-18 RX ADMIN — PANTOPRAZOLE SODIUM 40 MG: 40 TABLET, DELAYED RELEASE ORAL at 08:27

## 2024-08-18 RX ADMIN — Medication 1 TABLET: at 08:26

## 2024-08-18 RX ADMIN — POLYETHYLENE GLYCOL 3350 17 G: 17 POWDER, FOR SOLUTION ORAL at 08:25

## 2024-08-18 RX ADMIN — OXYBUTYNIN CHLORIDE 10 MG: 10 TABLET, EXTENDED RELEASE ORAL at 08:26

## 2024-08-18 RX ADMIN — ENOXAPARIN SODIUM 100 MG: 100 INJECTION SUBCUTANEOUS at 08:28

## 2024-08-18 RX ADMIN — Medication 2000 UNITS: at 08:26

## 2024-08-18 RX ADMIN — TAMSULOSIN HYDROCHLORIDE 0.4 MG: 0.4 CAPSULE ORAL at 08:26

## 2024-08-18 RX ADMIN — OMEGA-3-ACID ETHYL ESTERS 1000 MG: 1 CAPSULE, LIQUID FILLED ORAL at 08:27

## 2024-08-18 RX ADMIN — OXYCODONE HYDROCHLORIDE AND ACETAMINOPHEN 500 MG: 500 TABLET ORAL at 08:26

## 2024-08-18 RX ADMIN — WARFARIN SODIUM 5 MG: 5 TABLET ORAL at 12:39

## 2024-08-18 NOTE — TELEPHONE ENCOUNTER
Disregard previous message. I removed patient's sears and ureteral stent in the hospital.     Will need follow-up in 1-2 weeks for symptom check, PVR, and UA.

## 2024-08-18 NOTE — PROGRESS NOTES
[facility]  STRZ RENAL TELEMETRY 6K  [address] [facility] [address]  Dept: [phone]  Loc: [phone]  Visit Date: 2024    Urology Progress Note    Chief Complaint: Urinary Retention, Hematuria    Impression/Plan:  Gross Hematuria   Urinary Retention  - S/P Cystoscopy, Left Works by Christie on .  - Patient presented to [facility] in urinary retention 2/2 to gross hematuria with clots. On Lovenox Bridge to Coumadin for DVT's.  - 3-Way placed in the ED and our rounder MELI Leroy-CNP irrigated with 700 cc NS to light pink while in the ED on  with many small to medium sized clots out. CBI started and titrated to pink.  - Urine is concentrated yellow this AM with CBI clamped. I personally removed patient's sears catheter and ureteral stent. Tolerated well. Will plan for OP follow-up for PVR and UA + symptom check in 1-2 weeks.  - Continue Ciprofloxacin 500 mg BID x 5 days at discharge.     URO signing off. Okay for discharge from our standpoint.    Pertinent Urology Labs  Hgb- 13.6  WBC- 8.6  CRT- 0.8        Vitals:  BP (!) 102/42   Pulse 58   Temp 97.5 °F (36.4 °C) (Oral)   Resp 18   Ht 1.753 m (5' 9\")   Wt 100 kg (220 lb 7.4 oz)   SpO2 94%   BMI 32.56 kg/m²   Temp  Av.1 °F (36.7 °C)  Min: 97.5 °F (36.4 °C)  Max: 98.6 °F (37 °C)    Intake/Output Summary (Last 24 hours) at 2024 0949  Last data filed at 2024 0828  Gross per 24 hour   Intake 10 ml   Output 950 ml   Net -940 ml       Social History     Socioeconomic History    Marital status:      Spouse name: Not on file    Number of children: Not on file    Years of education: Not on file    Highest education level: Not on file   Occupational History    Not on file   Tobacco Use    Smoking status: Never     Passive exposure: Never    Smokeless tobacco: Never   Vaping Use    Vaping status: Never Used   Substance and Sexual Activity    Alcohol use: Yes     Alcohol/week: 3.3 standard  13.6 08/18/2024 05:55 AM    HCT 39.9 08/18/2024 05:55 AM     BMP:    Lab Results   Component Value Date/Time     08/16/2024 12:31 PM    K 4.0 08/16/2024 12:31 PM    K 4.1 09/30/2022 11:58 AM     08/16/2024 12:31 PM    CO2 22 08/16/2024 12:31 PM    BUN 17 08/16/2024 12:31 PM    CREATININE 0.8 08/16/2024 12:31 PM    CALCIUM 8.9 08/16/2024 12:31 PM    LABGLOM 89 08/16/2024 12:31 PM    LABGLOM >60 01/10/2024 01:16 PM       Jim Arredondo PA-C  08/18/24 9:49 AM  Urology

## 2024-08-18 NOTE — PROGRESS NOTES
Clinical Pharmacy Note                                               Warfarin Discharge Recommendations    Patient discharged from [facility] today on warfarin.    Warfarin indication: Hx of DVT & PE  INR goal during admission: 2.0-3.0  Previous home warfarin regimen: 5 mg daily  Interacting medications at discharge: none  Coumadin 5 mg tabs    Hospital Warfarin Doses & INR Results  Concurrent anticoagulants/antiplatelets: enoxaparin 100mg bid,   Significant warfarin drug-drug interactions: none     Date INR Warfarin Dose   8/17/24 1.6 (8/16) 7.5 mg    8/18/24 1.82  5 mg                                                Recent INRs:  Recent Labs     08/18/24  0555   INR 1.82*     Warfarin Discharge Instructions:   Date Warfarin Dose   8/19/24 (tomorrow) 5 mg (1 tablet)   8/20/24 5 mg (1 tablet)   8/21/24 5 mg (1 tablet)     Provider dosing warfarin: [facility]  Next INR date: Clinic will call with follow up appointment.  If clinic has not called you by Wednesday 8/21/24 please call clinic

## 2024-08-18 NOTE — PROGRESS NOTES
Discharge teaching and instructions for diagnosis/procedure of Gross Hematuria completed with patient using teachback method. AVS reviewed. Printed prescriptions given to patient. Patient voiced understanding regarding prescriptions, follow up appointments, and care of self at home. Discharged ambulatory to  independent living per family

## 2024-08-18 NOTE — PROCEDURES
PROCEDURE NOTE  Date: 8/18/2024   Name: Epi Franco  YOB: 1945    Procedures    12 lead EKG completed. Results handed to Carmen MONTERO

## 2024-08-18 NOTE — DISCHARGE INSTR - MEDS
Warfarin Discharge Instructions:   Date Warfarin Dose   8/19/24 (tomorrow) 5 mg (1 tablet)   8/20/24 5 mg (1 tablet)   8/21/24 5 mg (1 tablet)     Provider dosing warfarin: [facility]  Next INR date: Clinic will call with follow up appointment.  If clinic has not called you by Wednesday 8/21/24 please call clinic

## 2024-08-19 ENCOUNTER — CARE COORDINATION (OUTPATIENT)
Dept: CASE MANAGEMENT | Age: 79
End: 2024-08-19

## 2024-08-19 DIAGNOSIS — R31.0 GROSS HEMATURIA: Primary | ICD-10-CM

## 2024-08-19 LAB
EKG ATRIAL RATE: 55 BPM
EKG P AXIS: 33 DEGREES
EKG P-R INTERVAL: 148 MS
EKG Q-T INTERVAL: 406 MS
EKG QRS DURATION: 84 MS
EKG QTC CALCULATION (BAZETT): 388 MS
EKG R AXIS: -8 DEGREES
EKG T AXIS: 40 DEGREES
EKG VENTRICULAR RATE: 55 BPM

## 2024-08-19 PROCEDURE — 93010 ELECTROCARDIOGRAM REPORT: CPT | Performed by: INTERNAL MEDICINE

## 2024-08-19 NOTE — CARE COORDINATION
Care Transitions Note    Initial Call - Call within 2 business days of discharge: Yes    Patient Current Location:  Home: [address] [place] [id number]    Care Transition Nurse contacted the patient by telephone to perform post hospital discharge assessment, verified name and  as identifiers. Provided introduction to self, and explanation of the Care Transition Nurse role.     Patient: Epi Franco    Patient : 1945   MRN: [id number]    Reason for Admission: gross hematuria   Discharge Date: 24  RURS: Readmission Risk Score: 13.3      Last Discharge Facility       Date Complaint Diagnosis Description Type Department Provider    24 Hematuria; Urinary Retention Gross hematuria ... ED to Hosp-Admission (Discharged) (ADMITTED) [facility] 6K Savita Fay PA-C; Janette Kimble...            Was this an external facility discharge? No    Additional needs identified to be addressed with provider   No needs identified             Method of communication with provider: none.    Patients top risk factors for readmission: lack of knowledge about disease, medical condition-hematuria, urinary retention, CAD, HTN, CHF, and polypharmacy    Interventions to address risk factors:   Review of patient management of conditions/medications:    Communication with providers: pt scheduled    Care Summary Note:  Spoke with Dash, said he is doing pretty good.  Voiding without difficulty, urine dark last night, clearer this morning.  Stent was pulled before discharged.  Denies abd pain, fever, chills, dyspnea, dizziness, n/v/d.  Has not had a BM but took something in hospital and will take bowel meds he has at home.  Reviewed medications/changes, taking as directed.  Coumadin clinic @ [facility] should be calling for INR check.  If hasn't heard from them by Wed, he is to call.  Appetite and fluid intake is good.  Has f/u with urology  but said he will be OOT and will be changing that.  He also changed PCP f/u.  No other

## 2024-08-20 NOTE — OP NOTE
Sloan Soriano MD.  Urologic Surgery      [facility]    DATE: 8/14/2024  Patient:  Epi Franco  MRN: [id number]  YOB: 1945    SURGEON: Sloan Soriano MD.    ASSISTANT: none    PREOPERATIVE DIAGNOSIS: left ureteral stone      POSTOPERATIVE DIAGNOSIS:  left ureteral stone      PROCEDURE PERFORMED: cystoscopy, left ureteroscopy left holmium laser lithotripsy left stone basketing left stent placement    ANESTHESIA: General    COMPLICATIONS: none    OR BLOOD LOSS:  Minimal    FLUIDS: Cystalloids per Anesthesia    SPECIMENS:  * No specimens in log *      DRAINS: 6 x 26 dbl j stent    INDICATIONS FOR PROCEDURE:  The patient is a 79 y.o. male who presents today with Kidney stone [N20.0] here for Cystoscopy Left Ureteroscopy Laser Lithotripsy Basket Retrieval of Stone Fragments Left Ureteral Stent Placement. After risks, benefits and alternatives of the procedure were discussed with the patient, the patient elected to proceed.     DETAILS OF PROCEDURE:  After informed consent was obtained in the preoperative area, the patient was taken back to the operating room and transferred to the operating table in supine position.  Anesthesia was induced and antibiotics were given.  The patient was placed in modified dorsal lithotomy position and sterilely prepped and draped in a standard fashion.  A timeout occurred.  Two patient identifiers were used.      We entered the urethra with a 22 Portuguese scope.     We focused our attention on the left ureteral orifice.     The ureteral orifice was visualized, and using a dual lumen catheter two wires were advanced into the kidney under fluoroscopic guidance.     The rigid ureteroscope was assembled, placed next to the Glidewire, and advanced into the ureter carefully. A wire remained in place as a safety. The stone was located in the distal ureter.     we were able to advance our scope up to the stone without difficulty.       We used a 200 micron laser to  fragment all stones into sub 1-2 mm fragments for easy passage and clearance     Larger stone fragments were removed with a basket for stone analysis.      We surveyed the ureter. There were no papillary lesions, stricture, ureteral trauma, or papillary lesions.   Repeat ureteroscopy demonstrated no further stone fragments.    A stent was then placed.  the stent was advanced until it was in proper location. The Glidewire was then removed. A curl could be seen in the Left renal pelvis under using fluoroscopic vision, and in the bladder under direct visualization.     A string was left on the stent.     The patients bladder was drained. All instrumentation was removed. The patient was then awakened and discharged back to the PACU in good and stable condition.

## 2024-08-21 NOTE — DISCHARGE SUMMARY
On arrival had more than 700 mL of urine in his bladder as well as a clot in his urethral meatus.  Patient was on Lovenox and bridging back on his Coumadin. He had a kidney stone underwent lithotripsy and stent placement 2 days ago. Abdominal pain improved after catheter placed.  Patient had gross hematuria with catheter therefore irrigated, and seemed to improve patient condition. Urology was consulted and ordered admission with CT Scan and holding off anticoagulation. Hospitalist reached out to for further management. \"    8/17: Patient is sitting up in the chair.  He offers no new complaints, he denies any more abdominal pain after his Johnston catheter has been inserted.  His urine remains a pale candelaria color after CBI has been clamped.  Case was discussed with urology CNP who is okay for patient to resume anticoagulation. Additionally they are okay with patient being discharged, however patient would prefer to be monitored overnight and discharge tomorrow to ensure his urine remain clear.  He denies any fever/chills, no chest pain or shortness of breath worse than baseline.     8/18: Day of discharge.  Patient is seen lying in bed.  His Johnston remains in place with candelaria urine present.  He had his anticoagulation resumed yesterday.  Of note, did discuss with the pharmacist regarding OAC prescription and need for continued Lovenox bridging.  INR was noted at 1.82 and the pharmacist did not feel that continuing Lovenox injections was required at this time.  This was relayed to patient to was informed that he will follow-up with [facility] with whom he follows.  Pharmacist reports that they did send a fax over to their office updating them regarding patient's plan/condition.  Discussed with urology who is okay with patient being discharged home.  Urology PA did come to the bedside and personally removed patient's catheter as well as his string stent, that way patient does not have to go in the office

## 2024-08-23 ENCOUNTER — CARE COORDINATION (OUTPATIENT)
Dept: CASE MANAGEMENT | Age: 79
End: 2024-08-23

## 2024-08-23 NOTE — CARE COORDINATION
Care Transitions Note    Follow Up Call     Attempted to reach patient for transitions of care follow up.  Unable to reach patient.      Outreach Attempts:   HIPAA compliant voicemail left for patient.       Follow Up Appointment:   Future Appointments         Provider Specialty Dept Phone    8/29/2024 10:15 AM Margret Foley, MELI - CNP Urology [phone]    9/10/2024 10:10 AM Santiago Tabor MD Family Medicine [phone]    1/13/2025 8:00 AM Patricia Murguia MD Cardiology [phone]    1/15/2025 9:15 AM Margret Foley, MELI - CNP Urology [phone]            Plan for follow-up call in 2-5 days based on severity of symptoms and risk factors. Plan for next call: new or worsening symptoms  self management-hematuria, BM?      Pam Dash RN

## 2024-08-24 DIAGNOSIS — I25.10 CORONARY ARTERY DISEASE INVOLVING NATIVE CORONARY ARTERY OF NATIVE HEART WITHOUT ANGINA PECTORIS: ICD-10-CM

## 2024-08-26 ENCOUNTER — TELEPHONE (OUTPATIENT)
Dept: FAMILY MEDICINE CLINIC | Age: 79
End: 2024-08-26

## 2024-08-26 ENCOUNTER — CARE COORDINATION (OUTPATIENT)
Dept: CASE MANAGEMENT | Age: 79
End: 2024-08-26

## 2024-08-26 NOTE — TELEPHONE ENCOUNTER
Date of last visit:  7/29/2024  Date of next visit:  9/10/2024    Requested Prescriptions     Pending Prescriptions Disp Refills    warfarin (JANTOVEN) 5 MG tablet [Pharmacy Med Name: Jantoven Oral Tablet 5 MG] 90 tablet 1     Sig: TAKE 1 tablet by mouth once daily AS DIRECTED BY COUMADIN CLINIC    pantoprazole (PROTONIX) 40 MG tablet [Pharmacy Med Name: Pantoprazole Sodium Oral Tablet Delayed Release 40 MG] 90 tablet 1     Sig: TAKE 1 TABLET BY MOUTH EVERY DAY

## 2024-08-26 NOTE — CARE COORDINATION
Care Transitions Note    Follow Up Call     Attempted to reach patient for transitions of care follow up.  Unable to reach patient.  If no return call, CTN will sign off-2nd attempt.    Outreach Attempts:   Multiple attempts to contact patient, spouse/partner  at phone numbers on file.   HIPAA compliant voicemail left for patient, spouse/partner .   Unable to reach letter mailed to address on file.       Follow Up Appointment:   Future Appointments         Provider Specialty Dept Phone    8/29/2024 10:15 AM Margret Foley, MELI - CNP Urology [phone]    9/10/2024 10:10 AM Santiago Tabor MD Family Medicine [phone]    1/13/2025 8:00 AM Patricia Murguia MD Cardiology [phone]    1/15/2025 9:15 AM Margret Foley, MELI - CNP Urology [phone]            No further follow-up call indicated based on severity of symptoms and risk factors.     Pam Dash, RN

## 2024-08-26 NOTE — TELEPHONE ENCOUNTER
Pt stopped in to req an new order for supplies for his c pap machine and the one he had      Please send to [facility]     Please call pt once addressed

## 2024-08-27 RX ORDER — WARFARIN SODIUM 5 MG/1
TABLET ORAL
Qty: 90 TABLET | Refills: 1 | Status: SHIPPED | OUTPATIENT
Start: 2024-08-27

## 2024-08-27 RX ORDER — PANTOPRAZOLE SODIUM 40 MG/1
TABLET, DELAYED RELEASE ORAL
Qty: 90 TABLET | Refills: 1 | Status: SHIPPED | OUTPATIENT
Start: 2024-08-27

## 2024-08-27 NOTE — TELEPHONE ENCOUNTER
Order faxed to  [facility]    Called patient- Voicemail is full so could not leave a message.

## 2024-09-02 NOTE — PROGRESS NOTES
Physician Progress Note      PATIENT:               VINCENT LOPEZ  [facility] #:                  [id number]  :                       1945  ADMIT DATE:       2024 10:31 AM  DISCH DATE:        2024 1:30 PM  RESPONDING  PROVIDER #:        Savita aFy PA-C          QUERY TEXT:    Patient admitted with urinary retention. Noted documentation of Acute   complicated UTI in DS . In order to support the diagnosis of UTI, please   include additional clinical indicators in your documentation.  Or please   document if the diagnosis of UTI has been ruled out after further study.    The medical record reflects the following:  Risk Factors: Hydronephrosis, recent instrumentation/patient is a male, s/p   cystoscopy-left ureteroscopy and laser lithotripsy with basket retrieval and   stent placement on 2024  Clinical Indicators: DS  Acute complicated UTI- UA with moderate LE,   positive nitrites, will treat for 7-day total antibiotics.       Urology   recommends changing over to oral Cipro -prescription sent at discharge x 5   days.  CN urology -presents with hematuria, retention, S/p works , Has string   stent in place, began bleeding immediately after surgery, Hematuria - hand   irrigated, few clots remove, begin cbi, Urinary retention - sears placed,   700ml bloody urine out  UA : Bacteria-obscured, large blood, 15 ketones, moderate LE, positive   nitrites,  Urine culture no growth preliminary  Treatment: IV Rocephin, IVF, Hold anticoagulation    Thank you,  Lo Edouard S, CDS  Options provided:  -- Urinary Tract Infection present as evidenced by, Please document evidence.  -- Urinary Tract Infection was ruled out  -- Other - I will add my own diagnosis  -- Disagree - Not applicable / Not valid  -- Disagree - Clinically unable to determine / Unknown  -- Refer to Clinical Documentation Reviewer    PROVIDER RESPONSE TEXT:    Urinary Tract Infection is present as evidenced by What you

## 2024-09-03 ENCOUNTER — LAB (OUTPATIENT)
Dept: LAB | Age: 79
End: 2024-09-03

## 2024-09-03 DIAGNOSIS — N40.1 BPH WITH OBSTRUCTION/LOWER URINARY TRACT SYMPTOMS: ICD-10-CM

## 2024-09-03 DIAGNOSIS — N13.8 BPH WITH OBSTRUCTION/LOWER URINARY TRACT SYMPTOMS: ICD-10-CM

## 2024-09-03 LAB — PSA SERPL-MCNC: 9.56 NG/ML (ref 0–1)

## 2024-09-04 ENCOUNTER — OFFICE VISIT (OUTPATIENT)
Dept: UROLOGY | Age: 79
End: 2024-09-04
Payer: MEDICARE

## 2024-09-04 VITALS — WEIGHT: 213 LBS | RESPIRATION RATE: 22 BRPM | BODY MASS INDEX: 31.55 KG/M2 | HEIGHT: 69 IN

## 2024-09-04 DIAGNOSIS — N20.1 LEFT URETERAL CALCULUS: Primary | ICD-10-CM

## 2024-09-04 DIAGNOSIS — N13.8 BPH WITH OBSTRUCTION/LOWER URINARY TRACT SYMPTOMS: ICD-10-CM

## 2024-09-04 DIAGNOSIS — R33.9 URINARY RETENTION: ICD-10-CM

## 2024-09-04 DIAGNOSIS — N40.1 BPH WITH OBSTRUCTION/LOWER URINARY TRACT SYMPTOMS: ICD-10-CM

## 2024-09-04 DIAGNOSIS — R31.0 GROSS HEMATURIA: ICD-10-CM

## 2024-09-04 DIAGNOSIS — R97.20 ELEVATED PSA: ICD-10-CM

## 2024-09-04 LAB — POST VOID RESIDUAL (PVR): 149 ML

## 2024-09-04 PROCEDURE — 51798 US URINE CAPACITY MEASURE: CPT | Performed by: NURSE PRACTITIONER

## 2024-09-04 PROCEDURE — 1123F ACP DISCUSS/DSCN MKR DOCD: CPT | Performed by: NURSE PRACTITIONER

## 2024-09-04 PROCEDURE — 99214 OFFICE O/P EST MOD 30 MIN: CPT | Performed by: NURSE PRACTITIONER

## 2024-09-04 RX ORDER — TAMSULOSIN HYDROCHLORIDE 0.4 MG/1
0.4 CAPSULE ORAL DAILY
Qty: 90 CAPSULE | Refills: 3 | Status: SHIPPED | OUTPATIENT
Start: 2024-09-04

## 2024-09-04 RX ORDER — DOXYCYCLINE HYCLATE 100 MG
100 TABLET ORAL 2 TIMES DAILY
Qty: 28 TABLET | Refills: 0 | Status: SHIPPED | OUTPATIENT
Start: 2024-09-04 | End: 2024-10-30

## 2024-09-04 ASSESSMENT — ENCOUNTER SYMPTOMS
SHORTNESS OF BREATH: 0
ABDOMINAL PAIN: 0
COUGH: 0

## 2024-09-04 NOTE — PATIENT INSTRUCTIONS
Kidney stone prevention discussed and handout provided  -Increase water intake to 60 to 80 ounces a day  -Add mai to water  -Low oxalate diet  -Low salt diet  -Limit animal protein to 8 ounces a day    Continue flomax  Take doxy twice a day for month  PSA in one month, avoid bikes, motorcycles, mowing, sexual intercourse for week prior to psa lab    [employer] ordered, call [phone] number for kit. Declines at this time but gave order  KUB in 12 months    Tell prescribing doctor of coumadin that you are taking coumadin/doxycycline as this may thin blood more and require dosing change

## 2024-09-04 NOTE — PROGRESS NOTES
[facility] [place] [facility]  [facility]  [address]  [address]  [facility] [address]  Dept: [phone]  Loc: [phone]    Visit Date: 9/4/2024        HPI:   Epi is a 79 year old male typically seen for kidney stones, elevated PSA, BPH.      Seen in ER 6/11/24 for left flank pain.  CT showed 6-7mm stone in distal left ureter just prox to Left UVJ.  There is left hydroureter and mild left hydronephrosis. The urinary bladder is unremarkable. The prostate gland is enlarged measuring 4.4 x 5.4 x 5.7 cm.  Mild fusiform dilatation of the infrarenal abdominal aorta measuring 2.5 cm. 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  Severe diverticulosis of the descending and proximal sigmoid colon.  WBC 7.1, cre 0.9 (baseline). UA large blood, small leuk, WBC 5-9, RBC 25-50.      Seen 6/13/24.  Ureteral stone discussed. Trial of passage discussed. Flomax started.   Noted PSA was elevated and pt was given 2 weeks doxy by PCP and PSA was to be repeated. Urine cx negative.      DAPHNE 7/8/24 7mm simple appearing avascular cyst on right.  NO hydro.  Enlarged prostate.      KUB 7/3/24 constipation.  No kidney /ureter stones seen. Partially calcified splenic artery aneurysm.       Seen 7/10/24. No pain since seen last. Nocturia 1/night.  No hematuria, no dysuria.   No abdominal pain or flank pain. Overall feels well.  No problems urinating now or previously.       Seen in er due to left flank pain and cough. Ct 7/24/24.   Left-sided hydronephrosis and hydroureter secondary to a 4.8 mm stone at the left UVJ. Calcified gallstones.  Enlarged prostate gland. UA trace leuk.  Cre 1.0.  WBC 10.4     Seen 7/25/24.  PVR 93ml.  UA trace leuk. Left flank began day prior to ER,  was 8/10.  Now 0/10.  On flomax again.  Using tylenol for pain. Omnicef from PCP due to cough.  Discussed stone still seen on CT and ?same stone from

## 2024-09-09 ENCOUNTER — OFFICE VISIT (OUTPATIENT)
Dept: FAMILY MEDICINE CLINIC | Age: 79
End: 2024-09-09

## 2024-09-09 VITALS
HEIGHT: 69 IN | SYSTOLIC BLOOD PRESSURE: 132 MMHG | HEART RATE: 60 BPM | RESPIRATION RATE: 18 BRPM | BODY MASS INDEX: 31.92 KG/M2 | DIASTOLIC BLOOD PRESSURE: 78 MMHG | WEIGHT: 215.5 LBS

## 2024-09-09 DIAGNOSIS — I25.10 CORONARY ARTERY DISEASE INVOLVING NATIVE CORONARY ARTERY OF NATIVE HEART WITHOUT ANGINA PECTORIS: ICD-10-CM

## 2024-09-09 DIAGNOSIS — N20.0 KIDNEY STONE ON LEFT SIDE: ICD-10-CM

## 2024-09-09 DIAGNOSIS — K21.9 GASTROESOPHAGEAL REFLUX DISEASE WITHOUT ESOPHAGITIS: ICD-10-CM

## 2024-09-09 DIAGNOSIS — I10 ESSENTIAL HYPERTENSION: Primary | ICD-10-CM

## 2024-09-09 DIAGNOSIS — R00.1 SINUS BRADYCARDIA: ICD-10-CM

## 2024-09-09 DIAGNOSIS — I35.0 MODERATE AORTIC STENOSIS: ICD-10-CM

## 2024-09-09 DIAGNOSIS — D68.9 COAGULOPATHY (HCC): ICD-10-CM

## 2024-09-09 DIAGNOSIS — I50.42 CHRONIC COMBINED SYSTOLIC AND DIASTOLIC CONGESTIVE HEART FAILURE (HCC): ICD-10-CM

## 2024-09-09 DIAGNOSIS — M54.50 CHRONIC MIDLINE LOW BACK PAIN WITHOUT SCIATICA: ICD-10-CM

## 2024-09-09 DIAGNOSIS — E78.00 PURE HYPERCHOLESTEROLEMIA: Chronic | ICD-10-CM

## 2024-09-09 DIAGNOSIS — G89.29 CHRONIC MIDLINE LOW BACK PAIN WITHOUT SCIATICA: ICD-10-CM

## 2024-09-09 PROCEDURE — 1123F ACP DISCUSS/DSCN MKR DOCD: CPT | Performed by: FAMILY MEDICINE

## 2024-09-09 PROCEDURE — 99213 OFFICE O/P EST LOW 20 MIN: CPT | Performed by: FAMILY MEDICINE

## 2024-09-09 ASSESSMENT — ENCOUNTER SYMPTOMS
TROUBLE SWALLOWING: 0
NAUSEA: 0
ABDOMINAL PAIN: 0
COUGH: 0
SORE THROAT: 0
EYE PAIN: 0
CHEST TIGHTNESS: 0
SHORTNESS OF BREATH: 0
CONSTIPATION: 0
BACK PAIN: 0
BLOOD IN STOOL: 0

## 2024-09-26 NOTE — PROGRESS NOTES
Physician Progress Note      PATIENT:               VINCENT LOPEZ  [facility] #:                  [id number]  :                       1945  ADMIT DATE:       2024 10:31 AM  DISCH DATE:        2024 1:30 PM  RESPONDING  PROVIDER #:        Savita Fay PA-C          QUERY TEXT:    Pt admitted with Urinary retention and underwent Cystoscopy, left ureteroscopy   and laser lithotripsy with basket retrieval and stent placement 2024,   noted documentation of UTI.? If possible, please document in progress notes   and discharge summary the relationship if any between the UTI and the   Cystoscopy:  ?  The medical record reflects the following:  Risk Factors: Cystoscopy, left ureteroscopy and laser lithotripsy with basket   retrieval and stent placement, Hydronephrosis, left ureteral stone,  Clinical Indicators: DS  Cystoscopy left ureteroscopy and laser   lithotripsy with basket retrieval and stent placement 2024.  Acute   complicated UTI-Urine culture reviewed with no growth preliminary  Urology CN  Hematuria - hand irrigated, few clots remove, begin cbi,   Urinary retention - sears placed, 700ml bloody urine out  CT abdomen  A left ureteral stent appears intact  Treatment: Bladder irrigation, Urology consulted, serial labs, Sears placed.    Thank you,  Lo Edouard S, CDS  Options provided:  -- Urinary tract infection is due to the Cystoscopy  -- Urinary tract infection is not due to the cystoscopy, but is due to   ureteral stent.  -- Urinary tract infection is not due to the cystoscopy, but is due to other   incidental risk factor, Please specify other incidental risk factor.  -- Other - I will add my own diagnosis  -- Disagree - Not applicable / Not valid  -- Disagree - Clinically unable to determine / Unknown  -- Refer to Clinical Documentation Reviewer    PROVIDER RESPONSE TEXT:    Urinary tract infection is not due to the cystoscopy, but due to ureteral   stent    Query  Assessment/Plan:     There are no diagnoses linked to this encounter  74-year-old female   Labial cellulitis/abscess status post incision and drain the emergency room   Ulcerated lesion herpes culture obtained  Plan  Warm compress recommended   Continue antibiotics Bactrim as prescribed in the emergency room   Herpes culture sent   Return to office in 1-2 weeks for follow-up will call patient with results  Subjective:      Patient ID: Abril Jacobo is a 60 y o  female  HPI  59 yo female    3 , 1 C/S  No PMB   Here today follow-up from the emergency room secondary to abscess in her vulvar area patient went to the ER yesterday the try to drain the abscess minimum fluid noted patient started on antibiotics pain did not improved still swollen as per patient pain and abscess is on the right side worse with touch and sitting nothing makes it better        The following portions of the patient's history were reviewed and updated as appropriate: allergies, current medications, past family history, past medical history, past social history, past surgical history and problem list     Review of Systems      Objective:      /84 (BP Location: Left arm, Patient Position: Sitting, Cuff Size: Standard)   Ht 5' (1 524 m)   Wt 85 9 kg (189 lb 6 4 oz)   LMP  (LMP Unknown)   BMI 36 99 kg/m²          Physical Exam  Constitutional:       Appearance: She is well-developed  Comments: Feeling very discomfort in pain   Abdominal:      General: There is no distension  Palpations: Abdomen is soft  Tenderness: There is no abdominal tenderness  Genitourinary:     Labia:         Right: Tenderness and lesion present  Left: No rash, tenderness or lesion  Vagina: No signs of injury  No vaginal discharge, erythema or tenderness  Cervix: No cervical motion tenderness, discharge or friability  Adnexa:         Right: No mass, tenderness or fullness            Left: No mass, tenderness or fullness  Neurological:      Mental Status: She is alert and oriented to person, place, and time     Psychiatric:         Behavior: Behavior normal

## 2024-11-04 DIAGNOSIS — I25.10 CORONARY ARTERY DISEASE INVOLVING NATIVE CORONARY ARTERY OF NATIVE HEART WITHOUT ANGINA PECTORIS: ICD-10-CM

## 2024-11-04 NOTE — TELEPHONE ENCOUNTER
The pharmacy is  requesting a refill of the below medication which has been pended for you:     Requested Prescriptions     Pending Prescriptions Disp Refills    metoprolol succinate (TOPROL XL) 25 MG extended release tablet [Pharmacy Med Name: Metoprolol Succinate ER Oral Tablet Extended Release 24 Hour 25 MG] 45 tablet 1     Sig: TAKE 1/2 TABLET BY MOUTH EVERY DAY       Last Appointment Date: 9/9/2024  Next Appointment Date: 12/13/2024    No Known Allergies

## 2024-11-05 RX ORDER — METOPROLOL SUCCINATE 25 MG/1
TABLET, EXTENDED RELEASE ORAL
Qty: 45 TABLET | Refills: 1 | Status: SHIPPED | OUTPATIENT
Start: 2024-11-05

## 2024-11-26 DIAGNOSIS — I25.10 CORONARY ARTERY DISEASE INVOLVING NATIVE CORONARY ARTERY OF NATIVE HEART WITHOUT ANGINA PECTORIS: ICD-10-CM

## 2024-11-27 NOTE — TELEPHONE ENCOUNTER
Date of last visit:  9/9/2024  Date of next visit:  12/13/2024    Requested Prescriptions     Pending Prescriptions Disp Refills    oxyBUTYnin (DITROPAN-XL) 10 MG extended release tablet [Pharmacy Med Name: oxyBUTYnin Chloride ER Oral Tablet Extended Release 24 Hour 10 MG] 90 tablet 1     Sig: TAKE 1 TABLET BY MOUTH EVERY DAY    potassium chloride (KLOR-CON M) 20 MEQ extended release tablet [Pharmacy Med Name: Potassium Chloride Trinh ER Oral Tablet Extended Release 20 MEQ] 90 tablet 1     Sig: TAKE 1 TABLET BY MOUTH EVERY DAY

## 2024-11-28 RX ORDER — POTASSIUM CHLORIDE 1500 MG/1
TABLET, EXTENDED RELEASE ORAL
Qty: 90 TABLET | Refills: 1 | Status: SHIPPED | OUTPATIENT
Start: 2024-11-28

## 2024-11-28 RX ORDER — OXYBUTYNIN CHLORIDE 10 MG/1
10 TABLET, EXTENDED RELEASE ORAL DAILY
Qty: 90 TABLET | Refills: 1 | Status: SHIPPED | OUTPATIENT
Start: 2024-11-28

## 2024-12-13 ENCOUNTER — OFFICE VISIT (OUTPATIENT)
Dept: FAMILY MEDICINE CLINIC | Age: 79
End: 2024-12-13

## 2024-12-13 VITALS
HEART RATE: 60 BPM | WEIGHT: 222 LBS | RESPIRATION RATE: 16 BRPM | HEIGHT: 69 IN | DIASTOLIC BLOOD PRESSURE: 76 MMHG | SYSTOLIC BLOOD PRESSURE: 126 MMHG | BODY MASS INDEX: 32.88 KG/M2

## 2024-12-13 DIAGNOSIS — M54.50 CHRONIC MIDLINE LOW BACK PAIN WITHOUT SCIATICA: ICD-10-CM

## 2024-12-13 DIAGNOSIS — K21.9 GASTROESOPHAGEAL REFLUX DISEASE WITHOUT ESOPHAGITIS: ICD-10-CM

## 2024-12-13 DIAGNOSIS — Z23 NEED FOR INFLUENZA VACCINATION: ICD-10-CM

## 2024-12-13 DIAGNOSIS — D68.9 COAGULOPATHY (HCC): ICD-10-CM

## 2024-12-13 DIAGNOSIS — I35.0 MODERATE AORTIC STENOSIS: ICD-10-CM

## 2024-12-13 DIAGNOSIS — G89.29 CHRONIC MIDLINE LOW BACK PAIN WITHOUT SCIATICA: ICD-10-CM

## 2024-12-13 DIAGNOSIS — I25.10 CORONARY ARTERY DISEASE INVOLVING NATIVE CORONARY ARTERY OF NATIVE HEART WITHOUT ANGINA PECTORIS: ICD-10-CM

## 2024-12-13 DIAGNOSIS — E78.00 PURE HYPERCHOLESTEROLEMIA: ICD-10-CM

## 2024-12-13 DIAGNOSIS — I10 ESSENTIAL HYPERTENSION: Primary | ICD-10-CM

## 2024-12-13 DIAGNOSIS — I50.42 CHRONIC COMBINED SYSTOLIC AND DIASTOLIC CONGESTIVE HEART FAILURE (HCC): ICD-10-CM

## 2024-12-13 ASSESSMENT — ENCOUNTER SYMPTOMS
BACK PAIN: 0
BLOOD IN STOOL: 0
CHEST TIGHTNESS: 0
TROUBLE SWALLOWING: 0
EYE PAIN: 0
SORE THROAT: 0
ABDOMINAL PAIN: 0
COUGH: 0
CONSTIPATION: 0
NAUSEA: 0
SHORTNESS OF BREATH: 0

## 2024-12-13 NOTE — PROGRESS NOTES
Immunizations Administered       Name Date Dose Route    Influenza, AFLURIA, FLUZONE, (age2 y+), IM, Trivalent MDV, 0.5mL 12/13/2024 0.5 mL Intramuscular    Site: Deltoid- Right    Lot: [id number]    NDC: 35707-543-41

## 2024-12-13 NOTE — PROGRESS NOTES
Subjective   Patient ID: Epi Franco is a 79 y.o. male.      Ashd  stable       Chf    preserved EF      Hx  of  aortic  stenosis  moderate  to  severe       Bph   and  better and  no  stone          Kidney  stone         Right  hip  better   post  hip           Hypertension  This is a chronic problem. The current episode started more than 1 year ago. The problem has been resolved since onset. The problem is controlled. Pertinent negatives include no chest pain, headaches, palpitations or shortness of breath. The current treatment provides significant improvement. There are no compliance problems.    Gastroesophageal Reflux  He reports no abdominal pain, no chest pain, no coughing, no early satiety, no nausea or no sore throat. The symptoms are aggravated by certain foods. Pertinent negatives include no fatigue. He has tried a PPI for the symptoms. The treatment provided significant relief.     Past Medical History:   Diagnosis Date    CAD (coronary artery disease) 01/2006    cath with stent lad    Chronic back pain 2013      epidural block    Colon polyps 10/2005    Diverticulosis of colon     DVT of proximal leg (deep vein thrombosis) ([facility]) 12/04 2013    left     right  in 2013    DVT of proximal leg (deep vein thrombosis) ([facility])     left     GERD (gastroesophageal reflux disease) 11/2024    barrets    Kidney stone on left side 08/2024    left  side  in  2009  and 2020    Pulmonary embolus ([facility]) 6-2013 and  1-2015     had   right  leg  dvt    S/P cardiac cath 2014 at [facility]- patent LAD stent done 2006 and 40 to 50% lesions in other coronaries 04/12/2019    S/P cardiac cath 4/17/19- large all ectatic vessel, lm-p, lad prox 40%, mid 50%, distal 40 distal stent -patent, lcx prox 40%, rca prox 30%, dist 40%, edp 11, ef 50%- med  RX 04/17/2019    SOB (shortness of breath) on exertion 06/08/2020    Unspecified sleep apnea 2013    cpap     Past Surgical History:   Procedure Laterality Date    BACK SURGERY

## 2025-01-13 ENCOUNTER — OFFICE VISIT (OUTPATIENT)
Dept: CARDIOLOGY CLINIC | Age: 80
End: 2025-01-13
Payer: MEDICARE

## 2025-01-13 VITALS
SYSTOLIC BLOOD PRESSURE: 115 MMHG | WEIGHT: 223.2 LBS | HEART RATE: 59 BPM | DIASTOLIC BLOOD PRESSURE: 71 MMHG | BODY MASS INDEX: 33.06 KG/M2 | HEIGHT: 69 IN

## 2025-01-13 DIAGNOSIS — I25.10 CORONARY ARTERY DISEASE INVOLVING NATIVE CORONARY ARTERY OF NATIVE HEART WITHOUT ANGINA PECTORIS: Primary | ICD-10-CM

## 2025-01-13 DIAGNOSIS — E78.00 PURE HYPERCHOLESTEROLEMIA: Chronic | ICD-10-CM

## 2025-01-13 DIAGNOSIS — I42.0 DILATED CARDIOMYOPATHY (HCC): ICD-10-CM

## 2025-01-13 DIAGNOSIS — I50.42 CHRONIC COMBINED SYSTOLIC AND DIASTOLIC CONGESTIVE HEART FAILURE (HCC): ICD-10-CM

## 2025-01-13 DIAGNOSIS — I35.0 MODERATE AORTIC STENOSIS: ICD-10-CM

## 2025-01-13 DIAGNOSIS — Z98.890 S/P CARDIAC CATH: ICD-10-CM

## 2025-01-13 DIAGNOSIS — R00.1 SINUS BRADYCARDIA: ICD-10-CM

## 2025-01-13 DIAGNOSIS — Z95.820 S/P ANGIOPLASTY WITH STENT: ICD-10-CM

## 2025-01-13 DIAGNOSIS — I10 ESSENTIAL HYPERTENSION: ICD-10-CM

## 2025-01-13 PROCEDURE — 1123F ACP DISCUSS/DSCN MKR DOCD: CPT | Performed by: INTERNAL MEDICINE

## 2025-01-13 PROCEDURE — 3074F SYST BP LT 130 MM HG: CPT | Performed by: INTERNAL MEDICINE

## 2025-01-13 PROCEDURE — 1160F RVW MEDS BY RX/DR IN RCRD: CPT | Performed by: INTERNAL MEDICINE

## 2025-01-13 PROCEDURE — 3078F DIAST BP <80 MM HG: CPT | Performed by: INTERNAL MEDICINE

## 2025-01-13 PROCEDURE — 1159F MED LIST DOCD IN RCRD: CPT | Performed by: INTERNAL MEDICINE

## 2025-01-13 PROCEDURE — 99214 OFFICE O/P EST MOD 30 MIN: CPT | Performed by: INTERNAL MEDICINE

## 2025-01-13 NOTE — PROGRESS NOTES
ARbs    Moderate aortic stenosis  Echo  F/u scheduled    Hx of Chronic low K intake prior to aldactazide   Cont kcl  K 4.1    Lab  prior to next visit    The pat is Stable and doing well    D/w the pat the plan of care in detail    Discussed use, benefit, and side effects of prescribed medications. All patient questions answered. Pt voiced understanding. Instructed to continue current medications, diet and exercise. Continue risk factor modification and medical management. Patient agreed with treatment plan. Follow up as directed.    The patient is advised to attempt to improve diet and exercise patterns to aid in medical management of this problem.  Advised more plant based nutrition/meditarrean diet   Advised patient to call office or seek immediate medical attention if there is any new onset of  any chest pain, sob, palpitations, lightheadedness, dizziness, orthopnea, PND or pedal edema.   All medication side effects were discussed in details.      RTC in 6 month    Patricia Murguia MD,MD,FACC

## 2025-01-27 DIAGNOSIS — E78.00 PURE HYPERCHOLESTEROLEMIA: Chronic | ICD-10-CM

## 2025-01-27 DIAGNOSIS — D68.9 COAGULOPATHY (HCC): ICD-10-CM

## 2025-01-27 NOTE — TELEPHONE ENCOUNTER
Date of last visit:  12/13/2024  Date of next visit:  3/24/2025    Requested Prescriptions     Pending Prescriptions Disp Refills    atorvastatin (LIPITOR) 80 MG tablet 90 tablet 1     Sig: Take 1 tablet by mouth daily    folic acid (FOLVITE) 1 MG tablet 90 tablet 1     Sig: Take 1 tablet by mouth daily

## 2025-01-27 NOTE — TELEPHONE ENCOUNTER
----- Message from Elizabeth Machado sent at 7/17/2017 11:19 AM CDT -----  Contact: Pedro Kumari calling with regard to prescriptions that was supposed to be called in to pharmacy.  Please call patient to discuss further because I could not make heads or tails.  He can be reached at either number on file.   Pt called to req an refill on the following    atorvastatin (LIPITOR) 80 MG tablet     folic acid (FOLVITE) 1 MG tablet     Please send 90 days to [facility] pharmacy    Pt is completely out

## 2025-01-28 RX ORDER — FOLIC ACID 1 MG/1
1000 TABLET ORAL DAILY
Qty: 90 TABLET | Refills: 1 | Status: SHIPPED | OUTPATIENT
Start: 2025-01-28

## 2025-01-28 RX ORDER — ATORVASTATIN CALCIUM 80 MG/1
80 TABLET, FILM COATED ORAL DAILY
Qty: 90 TABLET | Refills: 1 | Status: SHIPPED | OUTPATIENT
Start: 2025-01-28

## 2025-02-17 NOTE — TELEPHONE ENCOUNTER
Date of last visit:  12/13/2024  Date of next visit:  3/24/2025    Requested Prescriptions     Pending Prescriptions Disp Refills    pantoprazole (PROTONIX) 40 MG tablet [Pharmacy Med Name: Pantoprazole Sodium Oral Tablet Delayed Release 40 MG] 90 tablet 1     Sig: TAKE 1 TABLET BY MOUTH EVERY DAY

## 2025-02-18 RX ORDER — PANTOPRAZOLE SODIUM 40 MG/1
TABLET, DELAYED RELEASE ORAL
Qty: 90 TABLET | Refills: 1 | Status: SHIPPED | OUTPATIENT
Start: 2025-02-18

## 2025-02-21 DIAGNOSIS — I25.10 CORONARY ARTERY DISEASE INVOLVING NATIVE CORONARY ARTERY OF NATIVE HEART WITHOUT ANGINA PECTORIS: ICD-10-CM

## 2025-02-21 RX ORDER — WARFARIN SODIUM 5 MG/1
TABLET ORAL
Qty: 30 TABLET | Refills: 0 | Status: SHIPPED | OUTPATIENT
Start: 2025-02-21

## 2025-02-21 RX ORDER — SPIRONOLACTONE AND HYDROCHLOROTHIAZIDE 25; 25 MG/1; MG/1
0.5 TABLET ORAL DAILY
Qty: 45 TABLET | Refills: 1 | Status: SHIPPED | OUTPATIENT
Start: 2025-02-21

## 2025-02-21 NOTE — TELEPHONE ENCOUNTER
Date of last visit:  12/13/2024  Date of next visit:  3/24/2025    Requested Prescriptions     Pending Prescriptions Disp Refills    warfarin (AMALIATOVEN) 5 MG tablet 30 tablet 0     Sig: TAKE 1 TABLET BY MOUTH ONCE DAILY AS DIRECTED BY COUMADIN CLINIC

## 2025-04-08 ENCOUNTER — OFFICE VISIT (OUTPATIENT)
Dept: FAMILY MEDICINE CLINIC | Age: 80
End: 2025-04-08

## 2025-04-08 VITALS
SYSTOLIC BLOOD PRESSURE: 120 MMHG | HEIGHT: 69 IN | DIASTOLIC BLOOD PRESSURE: 78 MMHG | RESPIRATION RATE: 18 BRPM | HEART RATE: 60 BPM | WEIGHT: 225 LBS | BODY MASS INDEX: 33.33 KG/M2

## 2025-04-08 DIAGNOSIS — Z00.00 MEDICARE ANNUAL WELLNESS VISIT, SUBSEQUENT: Primary | ICD-10-CM

## 2025-04-08 DIAGNOSIS — K21.9 GASTROESOPHAGEAL REFLUX DISEASE WITHOUT ESOPHAGITIS: ICD-10-CM

## 2025-04-08 DIAGNOSIS — I25.10 CORONARY ARTERY DISEASE INVOLVING NATIVE CORONARY ARTERY OF NATIVE HEART WITHOUT ANGINA PECTORIS: ICD-10-CM

## 2025-04-08 DIAGNOSIS — M79.645 THUMB PAIN, LEFT: ICD-10-CM

## 2025-04-08 DIAGNOSIS — E66.811 CLASS 1 OBESITY WITHOUT SERIOUS COMORBIDITY WITH BODY MASS INDEX (BMI) OF 34.0 TO 34.9 IN ADULT, UNSPECIFIED OBESITY TYPE: ICD-10-CM

## 2025-04-08 DIAGNOSIS — M54.50 CHRONIC MIDLINE LOW BACK PAIN WITHOUT SCIATICA: ICD-10-CM

## 2025-04-08 DIAGNOSIS — G89.29 CHRONIC MIDLINE LOW BACK PAIN WITHOUT SCIATICA: ICD-10-CM

## 2025-04-08 DIAGNOSIS — D68.9 COAGULOPATHY: ICD-10-CM

## 2025-04-08 DIAGNOSIS — I10 ESSENTIAL HYPERTENSION: ICD-10-CM

## 2025-04-08 DIAGNOSIS — E78.00 PURE HYPERCHOLESTEROLEMIA: Chronic | ICD-10-CM

## 2025-04-08 DIAGNOSIS — I35.0 MODERATE AORTIC STENOSIS: ICD-10-CM

## 2025-04-08 RX ORDER — PREDNISONE 20 MG/1
TABLET ORAL
Qty: 15 TABLET | Refills: 0 | Status: SHIPPED | OUTPATIENT
Start: 2025-04-08

## 2025-04-08 ASSESSMENT — ENCOUNTER SYMPTOMS
TROUBLE SWALLOWING: 0
BACK PAIN: 0
EYE PAIN: 0
SHORTNESS OF BREATH: 0
COUGH: 0
BLOOD IN STOOL: 0
SORE THROAT: 0
CONSTIPATION: 0
NAUSEA: 0
CHEST TIGHTNESS: 0
ORTHOPNEA: 0
ABDOMINAL PAIN: 0
BLURRED VISION: 0

## 2025-04-08 ASSESSMENT — LIFESTYLE VARIABLES
HOW OFTEN DO YOU HAVE A DRINK CONTAINING ALCOHOL: 2-4 TIMES A MONTH
HOW MANY STANDARD DRINKS CONTAINING ALCOHOL DO YOU HAVE ON A TYPICAL DAY: 1 OR 2

## 2025-04-08 ASSESSMENT — PATIENT HEALTH QUESTIONNAIRE - PHQ9
1. LITTLE INTEREST OR PLEASURE IN DOING THINGS: NOT AT ALL
SUM OF ALL RESPONSES TO PHQ QUESTIONS 1-9: 0
2. FEELING DOWN, DEPRESSED OR HOPELESS: NOT AT ALL
SUM OF ALL RESPONSES TO PHQ QUESTIONS 1-9: 0

## 2025-04-08 NOTE — PROGRESS NOTES
updated this visit:  Tobacco  Allergies  Meds  Sexual Hx                  Power  of health    son  pita   and  has a  will    as  pita loya

## 2025-04-08 NOTE — PATIENT INSTRUCTIONS
may be subject to a deductible, co-insurance, and/or copay.  Some of these benefits include a comprehensive review of your medical history including lifestyle, illnesses that may run in your family, and various assessments and screenings as appropriate.  After reviewing your medical record and screening and assessments performed today your provider may have ordered immunizations, labs, imaging, and/or referrals for you.  A list of these orders (if applicable) as well as your Preventive Care list are included within your After Visit Summary for your review.

## 2025-04-16 ENCOUNTER — LAB (OUTPATIENT)
Dept: LAB | Age: 80
End: 2025-04-16

## 2025-04-16 DIAGNOSIS — I10 ESSENTIAL HYPERTENSION: ICD-10-CM

## 2025-04-16 LAB
ALBUMIN SERPL BCG-MCNC: 3.8 G/DL (ref 3.4–4.9)
ALP SERPL-CCNC: 72 U/L (ref 40–129)
ALT SERPL W/O P-5'-P-CCNC: 31 U/L (ref 10–50)
ANION GAP SERPL CALC-SCNC: 12 MEQ/L (ref 8–16)
AST SERPL-CCNC: 28 U/L (ref 10–50)
BASOPHILS ABSOLUTE: 0 THOU/MM3 (ref 0–0.1)
BASOPHILS NFR BLD AUTO: 0.3 %
BILIRUB CONJ SERPL-MCNC: 0.3 MG/DL (ref 0–0.2)
BILIRUB SERPL-MCNC: 0.6 MG/DL (ref 0.3–1.2)
BUN SERPL-MCNC: 21 MG/DL (ref 8–23)
CALCIUM SERPL-MCNC: 9.4 MG/DL (ref 8.8–10.2)
CHLORIDE SERPL-SCNC: 106 MEQ/L (ref 98–111)
CO2 SERPL-SCNC: 23 MEQ/L (ref 22–29)
CREAT SERPL-MCNC: 0.9 MG/DL (ref 0.7–1.2)
DEPRECATED RDW RBC AUTO: 44.7 FL (ref 35–45)
EOSINOPHIL NFR BLD AUTO: 1.3 %
EOSINOPHILS ABSOLUTE: 0.1 THOU/MM3 (ref 0–0.4)
ERYTHROCYTE [DISTWIDTH] IN BLOOD BY AUTOMATED COUNT: 13.4 % (ref 11.5–14.5)
GFR SERPL CREATININE-BSD FRML MDRD: 86 ML/MIN/1.73M2
GLUCOSE SERPL-MCNC: 154 MG/DL (ref 74–109)
HCT VFR BLD AUTO: 43.3 % (ref 42–52)
HGB BLD-MCNC: 14.9 GM/DL (ref 14–18)
IMM GRANULOCYTES # BLD AUTO: 0.13 THOU/MM3 (ref 0–0.07)
IMM GRANULOCYTES NFR BLD AUTO: 1.2 %
LYMPHOCYTES ABSOLUTE: 1.1 THOU/MM3 (ref 1–4.8)
LYMPHOCYTES NFR BLD AUTO: 9.9 %
MCH RBC QN AUTO: 31.2 PG (ref 26–33)
MCHC RBC AUTO-ENTMCNC: 34.4 GM/DL (ref 32.2–35.5)
MCV RBC AUTO: 90.8 FL (ref 80–94)
MONOCYTES ABSOLUTE: 1.1 THOU/MM3 (ref 0.4–1.3)
MONOCYTES NFR BLD AUTO: 9.8 %
NEUTROPHILS ABSOLUTE: 8.8 THOU/MM3 (ref 1.8–7.7)
NEUTROPHILS NFR BLD AUTO: 77.5 %
NRBC BLD AUTO-RTO: 0 /100 WBC
PLATELET # BLD AUTO: 169 THOU/MM3 (ref 130–400)
PMV BLD AUTO: 9.9 FL (ref 9.4–12.4)
POTASSIUM SERPL-SCNC: 4.1 MEQ/L (ref 3.5–5.2)
PROT SERPL-MCNC: 6.3 G/DL (ref 6.4–8.3)
RBC # BLD AUTO: 4.77 MILL/MM3 (ref 4.7–6.1)
SODIUM SERPL-SCNC: 141 MEQ/L (ref 135–145)
TSH SERPL DL<=0.05 MIU/L-ACNC: 1.7 UIU/ML (ref 0.27–4.2)
WBC # BLD AUTO: 11.3 THOU/MM3 (ref 4.8–10.8)

## 2025-05-05 DIAGNOSIS — I25.10 CORONARY ARTERY DISEASE INVOLVING NATIVE CORONARY ARTERY OF NATIVE HEART WITHOUT ANGINA PECTORIS: ICD-10-CM

## 2025-05-05 NOTE — TELEPHONE ENCOUNTER
Date of last visit:  4/8/2025  Date of next visit:  7/22/2025    Requested Prescriptions     Pending Prescriptions Disp Refills    metoprolol succinate (TOPROL XL) 25 MG extended release tablet [Pharmacy Med Name: Metoprolol Succinate ER Oral Tablet Extended Release 24 Hour 25 MG] 45 tablet 0     Sig: TAKE 1/2 TABLET BY MOUTH EVERY DAY

## 2025-05-06 ENCOUNTER — OFFICE VISIT (OUTPATIENT)
Dept: FAMILY MEDICINE CLINIC | Age: 80
End: 2025-05-06

## 2025-05-06 VITALS
WEIGHT: 217 LBS | HEART RATE: 64 BPM | RESPIRATION RATE: 16 BRPM | SYSTOLIC BLOOD PRESSURE: 122 MMHG | HEIGHT: 69 IN | BODY MASS INDEX: 32.14 KG/M2 | DIASTOLIC BLOOD PRESSURE: 80 MMHG

## 2025-05-06 DIAGNOSIS — K04.7 DENTAL ABSCESS: Primary | ICD-10-CM

## 2025-05-06 PROCEDURE — 1123F ACP DISCUSS/DSCN MKR DOCD: CPT | Performed by: FAMILY MEDICINE

## 2025-05-06 PROCEDURE — 99213 OFFICE O/P EST LOW 20 MIN: CPT | Performed by: FAMILY MEDICINE

## 2025-05-06 RX ORDER — METOPROLOL SUCCINATE 25 MG/1
12.5 TABLET, EXTENDED RELEASE ORAL DAILY
Qty: 45 TABLET | Refills: 1 | Status: SHIPPED | OUTPATIENT
Start: 2025-05-06

## 2025-05-06 RX ORDER — CLINDAMYCIN HYDROCHLORIDE 300 MG/1
300 CAPSULE ORAL 3 TIMES DAILY
Qty: 30 CAPSULE | Refills: 0 | Status: SHIPPED | OUTPATIENT
Start: 2025-05-06 | End: 2025-05-16

## 2025-05-06 ASSESSMENT — ENCOUNTER SYMPTOMS
SHORTNESS OF BREATH: 0
CONSTIPATION: 0
COUGH: 0
CHEST TIGHTNESS: 0
NAUSEA: 0
BACK PAIN: 0
EYE PAIN: 0
BLOOD IN STOOL: 0
ABDOMINAL PAIN: 0
SORE THROAT: 0

## 2025-05-06 NOTE — PROGRESS NOTES
Subjective   Patient ID: Epi Franco is a 80 y.o. male.      Jaw  pain        note and iniside    of  the  mouth       Removal  broken  tooth    Dental Pain   This is a chronic problem. The current episode started more than 1 year ago. The problem has been gradually improving. Pertinent negatives include no fever.     Past Medical History:   Diagnosis Date    CAD (coronary artery disease) 01/2006    cath with stent lad    Chronic back pain 2013      epidural block    Colon polyps 10/2005    Diverticulosis of colon     DVT of proximal leg (deep vein thrombosis) ([facility] 12/04 2013    left     right  in 2013    DVT of proximal leg (deep vein thrombosis) ([facility])     left     GERD (gastroesophageal reflux disease) 11/2024    barrets    Kidney stone on left side 08/2024    left  side  in  2009  and 2020    Pulmonary embolus ([facility]) 6-2013 and  1-2015     had   right  leg  dvt    S/P cardiac cath 2014 at [facility]- patent LAD stent done 2006 and 40 to 50% lesions in other coronaries 04/12/2019    S/P cardiac cath 4/17/19- large all ectatic vessel, lm-p, lad prox 40%, mid 50%, distal 40 distal stent -patent, lcx prox 40%, rca prox 30%, dist 40%, edp 11, ef 50%- med  RX 04/17/2019    SOB (shortness of breath) on exertion 06/08/2020    Unspecified sleep apnea 2013    cpap        Review of Systems   Constitutional:  Negative for fatigue and fever.   HENT:  Positive for mouth sores. Negative for congestion, ear pain, postnasal drip, sore throat and trouble swallowing.    Eyes:  Negative for pain.   Respiratory:  Negative for cough, chest tightness and shortness of breath.    Cardiovascular:  Negative for chest pain, palpitations and leg swelling.   Gastrointestinal:  Negative for abdominal pain, blood in stool, constipation and nausea.   Genitourinary:  Negative for difficulty urinating, frequency and urgency.   Musculoskeletal:  Negative for arthralgias, back pain, joint swelling and neck stiffness.   Skin:  Negative for rash.

## 2025-05-12 ENCOUNTER — APPOINTMENT (OUTPATIENT)
Dept: GENERAL RADIOLOGY | Age: 80
End: 2025-05-12
Payer: MEDICARE

## 2025-05-12 ENCOUNTER — HOSPITAL ENCOUNTER (EMERGENCY)
Age: 80
Discharge: HOME | End: 2025-05-12
Attending: EMERGENCY MEDICINE
Payer: MEDICARE

## 2025-05-12 VITALS
RESPIRATION RATE: 19 BRPM | DIASTOLIC BLOOD PRESSURE: 92 MMHG | WEIGHT: 217 LBS | HEART RATE: 75 BPM | TEMPERATURE: 98.1 F | SYSTOLIC BLOOD PRESSURE: 126 MMHG | BODY MASS INDEX: 32.05 KG/M2 | OXYGEN SATURATION: 98 %

## 2025-05-12 DIAGNOSIS — M13.0 POLYARTHROPATHY: Primary | ICD-10-CM

## 2025-05-12 LAB
ALBUMIN SERPL BCG-MCNC: 3.6 G/DL (ref 3.4–4.9)
ALP SERPL-CCNC: 81 U/L (ref 40–129)
ALT SERPL W/O P-5'-P-CCNC: 24 U/L (ref 10–50)
ANION GAP SERPL CALC-SCNC: 11 MEQ/L (ref 8–16)
AST SERPL-CCNC: 34 U/L (ref 10–50)
BASOPHILS ABSOLUTE: 0.1 THOU/MM3 (ref 0–0.1)
BASOPHILS NFR BLD AUTO: 0.9 %
BILIRUB SERPL-MCNC: 0.9 MG/DL (ref 0.3–1.2)
BUN SERPL-MCNC: 12 MG/DL (ref 8–23)
CALCIUM SERPL-MCNC: 9.8 MG/DL (ref 8.8–10.2)
CHLORIDE SERPL-SCNC: 103 MEQ/L (ref 98–111)
CO2 SERPL-SCNC: 23 MEQ/L (ref 22–29)
CREAT SERPL-MCNC: 0.9 MG/DL (ref 0.7–1.2)
CRP SERPL-MCNC: < 0.3 MG/DL (ref 0–0.5)
DEPRECATED RDW RBC AUTO: 45.2 FL (ref 35–45)
EKG ATRIAL RATE: 80 BPM
EKG P AXIS: -19 DEGREES
EKG P-R INTERVAL: 164 MS
EKG Q-T INTERVAL: 360 MS
EKG QRS DURATION: 70 MS
EKG QTC CALCULATION (BAZETT): 415 MS
EKG R AXIS: 13 DEGREES
EKG T AXIS: 12 DEGREES
EKG VENTRICULAR RATE: 80 BPM
EOSINOPHIL NFR BLD AUTO: 3.6 %
EOSINOPHILS ABSOLUTE: 0.2 THOU/MM3 (ref 0–0.4)
ERYTHROCYTE [DISTWIDTH] IN BLOOD BY AUTOMATED COUNT: 13.2 % (ref 11.5–14.5)
ERYTHROCYTE [SEDIMENTATION RATE] IN BLOOD BY WESTERGREN METHOD: 4 MM/HR (ref 0–10)
GFR SERPL CREATININE-BSD FRML MDRD: 86 ML/MIN/1.73M2
GLUCOSE SERPL-MCNC: 99 MG/DL (ref 74–109)
HCT VFR BLD AUTO: 45.1 % (ref 42–52)
HGB BLD-MCNC: 15.2 GM/DL (ref 14–18)
IMM GRANULOCYTES # BLD AUTO: 0.02 THOU/MM3 (ref 0–0.07)
IMM GRANULOCYTES NFR BLD AUTO: 0.3 %
INR PPP: 6.17 (ref 0.85–1.13)
LACTIC ACID, SEPSIS: 1.2 MMOL/L (ref 0.5–1.9)
LYMPHOCYTES ABSOLUTE: 1.6 THOU/MM3 (ref 1–4.8)
LYMPHOCYTES NFR BLD AUTO: 24.7 %
MAGNESIUM SERPL-MCNC: 2 MG/DL (ref 1.6–2.6)
MCH RBC QN AUTO: 31.2 PG (ref 26–33)
MCHC RBC AUTO-ENTMCNC: 33.7 GM/DL (ref 32.2–35.5)
MCV RBC AUTO: 92.6 FL (ref 80–94)
MONOCYTES ABSOLUTE: 0.9 THOU/MM3 (ref 0.4–1.3)
MONOCYTES NFR BLD AUTO: 14.8 %
NEUTROPHILS ABSOLUTE: 3.6 THOU/MM3 (ref 1.8–7.7)
NEUTROPHILS NFR BLD AUTO: 55.7 %
NRBC BLD AUTO-RTO: 0 /100 WBC
OSMOLALITY SERPL CALC.SUM OF ELEC: 273.6 MOSMOL/KG (ref 275–300)
PLATELET # BLD AUTO: 190 THOU/MM3 (ref 130–400)
PMV BLD AUTO: 9.5 FL (ref 9.4–12.4)
POTASSIUM SERPL-SCNC: 4 MEQ/L (ref 3.5–5.2)
PROT SERPL-MCNC: 6.3 G/DL (ref 6.4–8.3)
RBC # BLD AUTO: 4.87 MILL/MM3 (ref 4.7–6.1)
SODIUM SERPL-SCNC: 137 MEQ/L (ref 135–145)
T4 FREE SERPL-MCNC: 1.2 NG/DL (ref 0.92–1.68)
TROPONIN, HIGH SENSITIVITY: 19 NG/L (ref 0–12)
TSH SERPL DL<=0.05 MIU/L-ACNC: 2.26 UIU/ML (ref 0.27–4.2)
WBC # BLD AUTO: 6.4 THOU/MM3 (ref 4.8–10.8)

## 2025-05-12 PROCEDURE — 99285 EMERGENCY DEPT VISIT HI MDM: CPT

## 2025-05-12 PROCEDURE — 85025 COMPLETE CBC W/AUTO DIFF WBC: CPT

## 2025-05-12 PROCEDURE — 83605 ASSAY OF LACTIC ACID: CPT

## 2025-05-12 PROCEDURE — 36415 COLL VENOUS BLD VENIPUNCTURE: CPT

## 2025-05-12 PROCEDURE — 87040 BLOOD CULTURE FOR BACTERIA: CPT

## 2025-05-12 PROCEDURE — 93010 ELECTROCARDIOGRAM REPORT: CPT | Performed by: INTERNAL MEDICINE

## 2025-05-12 PROCEDURE — 99204 OFFICE O/P NEW MOD 45 MIN: CPT

## 2025-05-12 PROCEDURE — 93005 ELECTROCARDIOGRAM TRACING: CPT | Performed by: EMERGENCY MEDICINE

## 2025-05-12 PROCEDURE — 85610 PROTHROMBIN TIME: CPT

## 2025-05-12 PROCEDURE — 80053 COMPREHEN METABOLIC PANEL: CPT

## 2025-05-12 PROCEDURE — 86140 C-REACTIVE PROTEIN: CPT

## 2025-05-12 PROCEDURE — 84443 ASSAY THYROID STIM HORMONE: CPT

## 2025-05-12 PROCEDURE — 99215 OFFICE O/P EST HI 40 MIN: CPT

## 2025-05-12 PROCEDURE — 84439 ASSAY OF FREE THYROXINE: CPT

## 2025-05-12 PROCEDURE — 83735 ASSAY OF MAGNESIUM: CPT

## 2025-05-12 PROCEDURE — 71045 X-RAY EXAM CHEST 1 VIEW: CPT

## 2025-05-12 PROCEDURE — 85651 RBC SED RATE NONAUTOMATED: CPT

## 2025-05-12 PROCEDURE — 84484 ASSAY OF TROPONIN QUANT: CPT

## 2025-05-12 RX ORDER — TRAMADOL HYDROCHLORIDE 50 MG/1
50 TABLET ORAL EVERY 4 HOURS PRN
Qty: 18 TABLET | Refills: 0 | Status: SHIPPED | OUTPATIENT
Start: 2025-05-12 | End: 2025-05-15

## 2025-05-12 RX ORDER — TRAMADOL HYDROCHLORIDE 50 MG/1
50 TABLET ORAL ONCE
Status: DISCONTINUED | OUTPATIENT
Start: 2025-05-12 | End: 2025-05-12 | Stop reason: HOSPADM

## 2025-05-12 ASSESSMENT — PAIN DESCRIPTION - DESCRIPTORS
DESCRIPTORS: ACHING
DESCRIPTORS: SHARP

## 2025-05-12 ASSESSMENT — PAIN DESCRIPTION - ORIENTATION
ORIENTATION: RIGHT
ORIENTATION: RIGHT

## 2025-05-12 ASSESSMENT — PAIN - FUNCTIONAL ASSESSMENT
PAIN_FUNCTIONAL_ASSESSMENT: ACTIVITIES ARE NOT PREVENTED
PAIN_FUNCTIONAL_ASSESSMENT: 0-10
PAIN_FUNCTIONAL_ASSESSMENT: 0-10

## 2025-05-12 ASSESSMENT — PAIN DESCRIPTION - LOCATION
LOCATION: HIP
LOCATION: BACK;HIP

## 2025-05-12 ASSESSMENT — ENCOUNTER SYMPTOMS
SHORTNESS OF BREATH: 0
ABDOMINAL PAIN: 0
BACK PAIN: 1

## 2025-05-12 ASSESSMENT — PAIN DESCRIPTION - ONSET
ONSET: ON-GOING
ONSET: ON-GOING

## 2025-05-12 ASSESSMENT — PAIN DESCRIPTION - PAIN TYPE
TYPE: ACUTE PAIN
TYPE: ACUTE PAIN

## 2025-05-12 ASSESSMENT — PAIN DESCRIPTION - FREQUENCY
FREQUENCY: INTERMITTENT
FREQUENCY: CONTINUOUS

## 2025-05-12 ASSESSMENT — PAIN SCALES - GENERAL
PAINLEVEL_OUTOF10: 9
PAINLEVEL_OUTOF10: 1

## 2025-05-12 NOTE — ED PROVIDER NOTES
[facility] EMERGENCY DEPARTMENT - VISIT NOTE    Patient Name: Epi Franco  MRN: [id number]  YOB: 1945  Date of Evaluation: 5/12/2025  Treating Resident Physician: Daniel Allen MD  Supervising Physician: Gurdeep Delaney DO    CHIEF COMPLAINT       Chief Complaint   Patient presents with    Fatigue    Leg Pain       HISTORY OF PRESENT ILLNESS    HPI    History obtained from chart review and the patient.    Epi Miramontes" is a 80 y.o. old male who presents to the emergency department by Walk In for evaluation of 6 weeks of ongoing pain in his joints, specifically his right lower extremity, generalized fatigue.  Patient states that he has been having worsening large joint pains, worse in the hips, right knee, right ankle.  Patient states that the pain has become progressively worsening to the point where he can no longer ambulate comfortably.  It is an antibiotic for recent dental fracture.  Follows Coumadin clinic.  He does describe generalized fatigue, but is most concerned about his joint pains.    Chart reviewed, relevant history summarized in HPI above.    REVIEW OF SYSTEMS   Review of Systems  Negative unless documented in HPI    PAST MEDICAL AND SURGICAL HISTORY   Epi Miramontes"  has a past medical history of CAD (coronary artery disease) (01/2006), Chronic back pain (2013), Colon polyps (10/2005), Diverticulosis of colon, DVT of proximal leg (deep vein thrombosis) ([facility]) (12/04 2013), DVT of proximal leg (deep vein thrombosis) ([facility]), GERD (gastroesophageal reflux disease) (11/2024), Kidney stone on left side (08/2024), Pulmonary embolus ([facility]) (6-2013 and  1-2015), S/P cardiac cath 2014 at [facility]- patent LAD stent done 2006 and 40 to 50% lesions in other coronaries (04/12/2019), S/P cardiac cath 4/17/19- large all ectatic vessel, lm-p, lad prox 40%, mid 50%, distal 40 distal stent -patent, lcx prox 40%, rca prox 30%, dist 40%, edp 11, ef 50%- med  RX (04/17/2019), SOB (shortness of breath) on

## 2025-05-12 NOTE — DISCHARGE INSTRUCTIONS
We believe that your joint pains are being caused by the specific type of inflammatory process. (Polymyalgia rheumatica).    Use the tramadol for pain control until you are able to follow-up with your primary care provider Dr. Tabor.  They may be able to further provide treatment or specialist referral.    Your INR today was 6.    Do not take your morning dose of Coumadin tomorrow 5/13.  Instead, call your Coumadin clinic and advised them of your INR of 6 and that you are on an antibiotic called clindamycin

## 2025-05-12 NOTE — ED TRIAGE NOTES
To room with c/o joint pain x 1.5 weeks. \"Everything hurts\"  Extreme Fatigue  Right knee, hip, and back pain is the worse.  Lost balance at home today and caught self. He doesn't feel dizzy but does feel weak.   He has to hold on to things to get around.  Hx of right hip replacement on right & pain now is most severe in right hip.       He went to a car show Saturday and couldn't walk. \"That is not like me\"    1-2 weeks ago his coumadin level was 4. He is being followed by Coumadin clinic.    Dental Fracture 3 weeks ago being followed by a dentist and took an antibiotic. Dr. Tabor saw him and changed antibiotics.

## 2025-05-12 NOTE — ED PROVIDER NOTES
[facility] EMERGENCY DEPARTMENT  Urgent Care Encounter      CHIEF COMPLAINT       Chief Complaint   Patient presents with    Fatigue    Leg Pain       Nurses Notes reviewed and I agree except as noted in the HPI.  HISTORY OF PRESENT ILLNESS   Epi Franco is a 80 y.o. male who presents to urgent care with complaints of extreme weakness and fatigue.  Patient reports he has been treated for dental infection at home for the last 2 weeks.  Reports he has had his antibiotics changed twice.  Patient reports he is having a very painful left hip and left knee.  Reports he does have a right hip replacement.  Patient reports he has had multiple falls recently due to extreme weakness.  Reports he is normally ambulatory and very active although has not been able to due to excessive fatigue.    REVIEW OF SYSTEMS     Review of Systems   Constitutional:  Positive for fatigue. Negative for fever.   Respiratory:  Negative for shortness of breath.    Gastrointestinal:  Negative for abdominal pain.   Musculoskeletal:  Positive for arthralgias and back pain.   Neurological:  Positive for weakness. Negative for dizziness and seizures.       PAST MEDICAL HISTORY         Diagnosis Date    CAD (coronary artery disease) 01/2006    cath with stent lad    Chronic back pain 2013      epidural block    Colon polyps 10/2005    Diverticulosis of colon     DVT of proximal leg (deep vein thrombosis) ([facility] 12/04  2013    left     right  in 2013    DVT of proximal leg (deep vein thrombosis) ([facility])     left     GERD (gastroesophageal reflux disease) 11/2024    barrets    Kidney stone on left side 08/2024    left  side  in  2009  and 2020    Pulmonary embolus ([facility]) 6-2013 and  1-2015     had   right  leg  dvt    S/P cardiac cath 2014 at [facility]- patent LAD stent done 2006 and 40 to 50% lesions in other coronaries 04/12/2019    S/P cardiac cath 4/17/19- large all ectatic vessel, lm-p, lad prox 40%, mid 50%, distal 40 distal stent -patent, lcx

## 2025-05-13 NOTE — ED NOTES
Provider notified of critical result. Patient resting in bed. Respirations easy and unlabored. No distress noted. Call light within reach.

## 2025-05-13 NOTE — DISCHARGE INSTR - COC
Continuity of Care Form    Patient Name: Epi Franco   :  1945  MRN:  [id number]    Admit date:  2025  Discharge date:  ***    Code Status Order: Prior   Advance Directives:     Admitting Physician:  No admitting provider for patient encounter.  PCP: Santiago Tabor MD    Discharging Nurse: ***  Discharging Hospital Unit/Room#: 005A  Discharging Unit Phone Number: ***    Emergency Contact:   Extended Emergency Contact Information  Primary Emergency Contact: Marilin Franco  Address: [address] [place] [id number] [facility]  Home Phone: [phone]  Mobile Phone: [phone]  Relation: Spouse  Secondary Emergency Contact: Deuce Franco   [facility]  Home Phone: [phone]  Mobile Phone: [phone]  Relation: Brother/Sister    Past Surgical History:  Past Surgical History:   Procedure Laterality Date    BACK SURGERY  2015    Dr Mayen @ [facility])     CARDIAC CATHETERIZATION  2014       50%  lesion    bamdad    CARDIAC CATHETERIZATION  2019      dr lam  and  diffuse  disease  30 to 50%    COLONOSCOPY  2012        polyps   due       CORONARY ANGIOPLASTY WITH STENT PLACEMENT      LAD    CYSTOSCOPY W/ LASER LITHOTRIPSY Left 2024    with stent  per  dr gardiner    ESOPHAGOGASTRODUODENOSCOPY  2024    dr nicholson    EYE SURGERY      bilateral    HERNIA REPAIR  2006    umbilical    JOINT REPLACEMENT Right 2019     [place]  right  hip    URETER SURGERY N/A 2024    Cystoscopy Left Ureteroscopy Laser Lithotripsy Basket Retrieval of Stone Fragments Left Ureteral Stent Placement performed by Sloan Soriano MD at [facility] OR    VENA CAVA FILTER PLACEMENT  2016    placed  iin  1-  and  removed  -       Immunization History:   Immunization History   Administered Date(s) Administered    COVID-19, PFIZER PURPLE top, DILUTE for use, (age 12 y+), 30mcg/0.3mL

## 2025-05-17 LAB
BACTERIA BLD AEROBE CULT: NORMAL
BACTERIA BLD AEROBE CULT: NORMAL

## 2025-05-19 ENCOUNTER — OFFICE VISIT (OUTPATIENT)
Dept: FAMILY MEDICINE CLINIC | Age: 80
End: 2025-05-19

## 2025-05-19 VITALS
WEIGHT: 207 LBS | DIASTOLIC BLOOD PRESSURE: 74 MMHG | HEART RATE: 72 BPM | HEIGHT: 69 IN | RESPIRATION RATE: 18 BRPM | BODY MASS INDEX: 30.66 KG/M2 | SYSTOLIC BLOOD PRESSURE: 118 MMHG

## 2025-05-19 DIAGNOSIS — I35.0 MODERATE AORTIC STENOSIS: ICD-10-CM

## 2025-05-19 DIAGNOSIS — K04.7 DENTAL ABSCESS: Primary | ICD-10-CM

## 2025-05-19 DIAGNOSIS — D68.9 COAGULOPATHY: ICD-10-CM

## 2025-05-19 DIAGNOSIS — R63.4 WEIGHT LOSS: ICD-10-CM

## 2025-05-19 PROCEDURE — 99213 OFFICE O/P EST LOW 20 MIN: CPT | Performed by: FAMILY MEDICINE

## 2025-05-19 PROCEDURE — 1123F ACP DISCUSS/DSCN MKR DOCD: CPT | Performed by: FAMILY MEDICINE

## 2025-05-19 ASSESSMENT — ENCOUNTER SYMPTOMS
TROUBLE SWALLOWING: 0
COUGH: 0
BLOOD IN STOOL: 0
EYE PAIN: 0
CONSTIPATION: 0
NAUSEA: 0
SHORTNESS OF BREATH: 0
BACK PAIN: 0
CHEST TIGHTNESS: 0
SORE THROAT: 0
ABDOMINAL PAIN: 0

## 2025-05-19 NOTE — PROGRESS NOTES
Subjective   Patient ID: Epi Franco is a 80 y.o. male.       Weight  on  225   on  4-8-25 and now   207 and  muscle  weakness        May  6    given  antibiotic      In  er and   lab  ok and   sed  rate  normal        Soup  only    Dental Pain   This is a new problem. The current episode started in the past 7 days. The problem has been gradually improving. The pain is mild. Pertinent negatives include no fever. He has tried acetaminophen for the symptoms.   Weight Loss  This is a new problem. The current episode started more than 1 month ago. The problem occurs rarely. The problem has been resolved. Pertinent negatives include no abdominal pain, arthralgias, chest pain, congestion, coughing, fatigue, fever, headaches, joint swelling, nausea, neck pain, numbness, rash, sore throat or weakness. He has tried eating for the symptoms. The treatment provided moderate relief.     Past Medical History:   Diagnosis Date    CAD (coronary artery disease) 01/2006    cath with stent lad    Chronic back pain 2013      epidural block    Colon polyps 10/2005    Diverticulosis of colon     DVT of proximal leg (deep vein thrombosis) ([facility]) 12/04 2013    left     right  in 2013    DVT of proximal leg (deep vein thrombosis) ([facility])     left     GERD (gastroesophageal reflux disease) 11/2024    barrets    Kidney stone on left side 08/2024    left  side  in  2009  and 2020    Pulmonary embolus ([facility]) 6-2013 and  1-2015     had   right  leg  dvt    S/P cardiac cath 2014 at [facility]- patent LAD stent done 2006 and 40 to 50% lesions in other coronaries 04/12/2019    S/P cardiac cath 4/17/19- large all ectatic vessel, lm-p, lad prox 40%, mid 50%, distal 40 distal stent -patent, lcx prox 40%, rca prox 30%, dist 40%, edp 11, ef 50%- med  RX 04/17/2019    SOB (shortness of breath) on exertion 06/08/2020    Unspecified sleep apnea 2013    cpap     Past Surgical History:   Procedure Laterality Date    BACK SURGERY  05/26/2015    Dr Mayen @

## 2025-05-30 DIAGNOSIS — I25.10 CORONARY ARTERY DISEASE INVOLVING NATIVE CORONARY ARTERY OF NATIVE HEART WITHOUT ANGINA PECTORIS: ICD-10-CM

## 2025-06-02 NOTE — TELEPHONE ENCOUNTER
Date of last visit:  5/19/2025  Date of next visit:  7/22/2025    Requested Prescriptions     Pending Prescriptions Disp Refills    potassium chloride (KLOR-CON M) 20 MEQ extended release tablet [Pharmacy Med Name: Potassium Chloride Trinh ER Oral Tablet Extended Release 20 MEQ] 90 tablet 1     Sig: TAKE 1 TABLET BY MOUTH EVERY DAY    oxyBUTYnin (DITROPAN-XL) 10 MG extended release tablet [Pharmacy Med Name: oxyBUTYnin Chloride ER Oral Tablet Extended Release 24 Hour 10 MG] 90 tablet 1     Sig: TAKE 1 TABLET BY MOUTH EVERY DAY

## 2025-06-03 RX ORDER — OXYBUTYNIN CHLORIDE 10 MG/1
10 TABLET, EXTENDED RELEASE ORAL DAILY
Qty: 90 TABLET | Refills: 1 | Status: SHIPPED | OUTPATIENT
Start: 2025-06-03

## 2025-06-03 RX ORDER — POTASSIUM CHLORIDE 1500 MG/1
20 TABLET, EXTENDED RELEASE ORAL DAILY
Qty: 90 TABLET | Refills: 1 | Status: SHIPPED | OUTPATIENT
Start: 2025-06-03

## 2025-06-24 ENCOUNTER — TELEPHONE (OUTPATIENT)
Dept: FAMILY MEDICINE CLINIC | Age: 80
End: 2025-06-24

## 2025-06-24 DIAGNOSIS — L30.9 DERMATITIS: Primary | ICD-10-CM

## 2025-06-24 NOTE — TELEPHONE ENCOUNTER
Patient called to req an cream for heat rash.He stated that he uses it for under arms and chest area.    Please send to [facility]

## 2025-06-26 RX ORDER — NYSTATIN 100000 [USP'U]/G
POWDER TOPICAL
Qty: 60 G | Refills: 1 | Status: SHIPPED | OUTPATIENT
Start: 2025-06-26

## 2025-06-29 ENCOUNTER — HOSPITAL ENCOUNTER (EMERGENCY)
Age: 80
Discharge: HOME | End: 2025-06-29
Attending: EMERGENCY MEDICINE
Payer: MEDICARE

## 2025-06-29 VITALS
RESPIRATION RATE: 18 BRPM | DIASTOLIC BLOOD PRESSURE: 63 MMHG | SYSTOLIC BLOOD PRESSURE: 104 MMHG | WEIGHT: 207 LBS | HEIGHT: 69 IN | HEART RATE: 76 BPM | TEMPERATURE: 98.2 F | OXYGEN SATURATION: 100 % | BODY MASS INDEX: 30.66 KG/M2

## 2025-06-29 DIAGNOSIS — Z79.01 ANTICOAGULATED ON COUMADIN: ICD-10-CM

## 2025-06-29 DIAGNOSIS — K91.840 BLEEDING POST TOOTH EXTRACTION: Primary | ICD-10-CM

## 2025-06-29 LAB
BASOPHILS ABSOLUTE: 0 THOU/MM3 (ref 0–0.1)
BASOPHILS NFR BLD AUTO: 0.6 %
DEPRECATED RDW RBC AUTO: 50.4 FL (ref 35–45)
EOSINOPHIL NFR BLD AUTO: 2.7 %
EOSINOPHILS ABSOLUTE: 0.2 THOU/MM3 (ref 0–0.4)
ERYTHROCYTE [DISTWIDTH] IN BLOOD BY AUTOMATED COUNT: 13.7 % (ref 11.5–14.5)
HCT VFR BLD AUTO: 42.9 % (ref 42–52)
HGB BLD-MCNC: 13.6 GM/DL (ref 14–18)
IMM GRANULOCYTES # BLD AUTO: 0.02 THOU/MM3 (ref 0–0.07)
IMM GRANULOCYTES NFR BLD AUTO: 0.3 %
INR PPP: 2.58 (ref 0.85–1.13)
LYMPHOCYTES ABSOLUTE: 1.3 THOU/MM3 (ref 1–4.8)
LYMPHOCYTES NFR BLD AUTO: 19.9 %
MCH RBC QN AUTO: 31.9 PG (ref 26–33)
MCHC RBC AUTO-ENTMCNC: 31.7 GM/DL (ref 32.2–35.5)
MCV RBC AUTO: 100.7 FL (ref 80–94)
MONOCYTES ABSOLUTE: 0.8 THOU/MM3 (ref 0.4–1.3)
MONOCYTES NFR BLD AUTO: 12.9 %
NEUTROPHILS ABSOLUTE: 4.1 THOU/MM3 (ref 1.8–7.7)
NEUTROPHILS NFR BLD AUTO: 63.6 %
NRBC BLD AUTO-RTO: 0 /100 WBC
PLATELET # BLD AUTO: 164 THOU/MM3 (ref 130–400)
PMV BLD AUTO: 10 FL (ref 9.4–12.4)
PROTHROMBIN TIME: 29.7 SECONDS (ref 10–13.5)
RBC # BLD AUTO: 4.26 MILL/MM3 (ref 4.7–6.1)
WBC # BLD AUTO: 6.4 THOU/MM3 (ref 4.8–10.8)

## 2025-06-29 PROCEDURE — 99284 EMERGENCY DEPT VISIT MOD MDM: CPT

## 2025-06-29 PROCEDURE — 2500000003 HC RX 250 WO HCPCS

## 2025-06-29 PROCEDURE — 96365 THER/PROPH/DIAG IV INF INIT: CPT

## 2025-06-29 PROCEDURE — 85025 COMPLETE CBC W/AUTO DIFF WBC: CPT

## 2025-06-29 PROCEDURE — 85610 PROTHROMBIN TIME: CPT

## 2025-06-29 RX ORDER — TRANEXAMIC ACID 100 MG/ML
1000 INJECTION, SOLUTION INTRAVENOUS ONCE
Status: COMPLETED | OUTPATIENT
Start: 2025-06-29 | End: 2025-06-29

## 2025-06-29 RX ORDER — TRANEXAMIC ACID 10 MG/ML
1000 INJECTION, SOLUTION INTRAVENOUS ONCE
Status: COMPLETED | OUTPATIENT
Start: 2025-06-29 | End: 2025-06-29

## 2025-06-29 RX ADMIN — TRANEXAMIC ACID 1000 MG: 10 INJECTION, SOLUTION INTRAVENOUS at 06:46

## 2025-06-29 RX ADMIN — TRANEXAMIC ACID 1000 MG: 100 INJECTION, SOLUTION INTRAVENOUS at 05:00

## 2025-06-29 RX ADMIN — TRANEXAMIC ACID 1000 MG: 100 INJECTION, SOLUTION INTRAVENOUS at 06:10

## 2025-06-29 ASSESSMENT — PAIN - FUNCTIONAL ASSESSMENT
PAIN_FUNCTIONAL_ASSESSMENT: NONE - DENIES PAIN

## 2025-06-29 NOTE — ED NOTES
Pt resting on cot. Pt states the bleeding has stopped. Dr. Abraham notified pt would like an update

## 2025-06-29 NOTE — DISCHARGE INSTRUCTIONS
Pt called stated he sent Dr. Samual Pallas a message through My Chart and he told him to give the office a call. Pt has questions regarding this lab work. You were seen in the ED for bleeding from teeth. Please only drink liquids today to allow further healing. Soft foods in the upcoming days. Continue to take all other medications as prescribed and indicated.  Follow-up with Dentist/Oral Surgeon as soon as possible for further management.  Also follow-up with PCP as soon as possible for further evaluation and management.  Return to the ED for any new or worsening symptoms, or any additional concerns.

## 2025-06-29 NOTE — ED PROVIDER NOTES
Transfer of Care Note:   Physician Signing out: Eleuterio Almanza MD  Receiving Physician: Frantz Clifton MD  Sign out time: 0610    Brief history:  80M who presents to the ED for evaluation of dental bleeding x2d. Patient reports that 4 days ago he had a dental procedure performed where he had 1 tooth pulled from the upper jaw on each side.  Currently on Coumadin. He stated that while at home he has been biting down the gauze however it only helps for short period.  Labs reassuring.  TXA soaked gauze applied to bleeding sites.  Furthermore, TXA IV given.    Items pending that need to be checked:  -Reevaluation      Additional Assessment and results:   I have personally performed a face to face diagnostic evaluation on this patient. The patient's initial evaluation and plan have been discussed with the prior physician who initially evaluated the patient. Nursing Notes, Past Medical Hx, Past Surgical Hx, Social Hx, Allergies, vital signs and Family Hx were all reviewed.      Vitals:    06/29/25 0912   BP: 104/63   Pulse: 76   Resp: 18   Temp:    SpO2: 100%     Physical Exam      Labs Reviewed   CBC WITH AUTO DIFFERENTIAL - Abnormal; Notable for the following components:       Result Value    RBC 4.26 (*)     Hemoglobin 13.6 (*)     .7 (*)     MCHC 31.7 (*)     RDW-SD 50.4 (*)     All other components within normal limits   PROTIME-INR - Abnormal; Notable for the following components:    Protime 29.7 (*)     INR 2.58 (*)     All other components within normal limits         Medications   tranexamic acid (CYKLOKAPRON) injection 1,000 mg (1,000 mg Topical Given 6/29/25 0500)   tranexamic acid (CYKLOKAPRON) injection 1,000 mg (1,000 mg Topical Given 6/29/25 0610)   tranexamic acid-NaCl IVPB premix 1,000 mg (0 mg IntraVENous Stopped 6/29/25 0748)         No orders to display         ED Course as of 06/30/25 2204   Sun Jun 29, 2025   0451 There is clots present on bilateral upper jaw or teeth removed, there was 
visit.    Physical Exam  Vitals and nursing note reviewed.   Constitutional:       General: He is not in acute distress.     Appearance: Normal appearance.   HENT:      Head: Normocephalic.      Nose: Nose normal.      Mouth/Throat:     Cardiovascular:      Rate and Rhythm: Normal rate.      Pulses: Normal pulses.   Pulmonary:      Effort: Pulmonary effort is normal. No respiratory distress.      Breath sounds: Normal breath sounds.   Abdominal:      Palpations: Abdomen is soft.      Tenderness: There is no abdominal tenderness.   Musculoskeletal:         General: Normal range of motion.      Cervical back: Normal range of motion.   Skin:     General: Skin is warm.      Capillary Refill: Capillary refill takes less than 2 seconds.   Neurological:      General: No focal deficit present.      Mental Status: He is alert and oriented to person, place, and time.   Psychiatric:         Mood and Affect: Mood normal.         Behavior: Behavior normal.           ED RESULTS   Laboratory results (none if blank):  Labs Reviewed   CBC WITH AUTO DIFFERENTIAL   PROTIME-INR     All laboratory results are individually reviewed and interpreted by me in the clinical context of this patient.  See ED course below for results interpretation if applicable.  (A negative COVID-19 test should be interpreted as COVID no longer suspected unless otherwise noted in this encounter documentation note)  (Any cultures that may have been sent were not resulted at the time of this patient ED visit)      Radiologic studies results available at the moment of this note (None if blank):  No orders to display     See ED course below for my interpretation if applicable.  All radiology images independently reviewed by me in the clinical context of this patient, in addition to interpretation provided by the radiologist.      EKG interpretation (none if blank):  Not applicable  All EKG results are individually reviewed and interpreted by me in the clinical

## 2025-06-29 NOTE — ED TRIAGE NOTES
Pt present to the ED from home with c/c post-op tooth removal. Pt states he had two teeth removed on Wednesday and the bleeding has been occurring since Thursday. Pt states he has been taking blood thinners (lovenox) which has made the bleeding worse. This RN noted 2 large blood clots in the roof of the pt's mouth. VSS.

## 2025-07-08 ENCOUNTER — OFFICE VISIT (OUTPATIENT)
Dept: CARDIOLOGY CLINIC | Age: 80
End: 2025-07-08
Payer: MEDICARE

## 2025-07-08 VITALS
DIASTOLIC BLOOD PRESSURE: 61 MMHG | HEIGHT: 69 IN | SYSTOLIC BLOOD PRESSURE: 106 MMHG | BODY MASS INDEX: 28.73 KG/M2 | WEIGHT: 194 LBS | HEART RATE: 60 BPM

## 2025-07-08 DIAGNOSIS — I50.42 CHRONIC COMBINED SYSTOLIC AND DIASTOLIC CONGESTIVE HEART FAILURE (HCC): ICD-10-CM

## 2025-07-08 DIAGNOSIS — I42.0 DILATED CARDIOMYOPATHY (HCC): ICD-10-CM

## 2025-07-08 DIAGNOSIS — E78.00 PURE HYPERCHOLESTEROLEMIA: Chronic | ICD-10-CM

## 2025-07-08 DIAGNOSIS — R00.1 SINUS BRADYCARDIA: ICD-10-CM

## 2025-07-08 DIAGNOSIS — I35.0 MODERATE AORTIC STENOSIS: ICD-10-CM

## 2025-07-08 DIAGNOSIS — I25.10 CORONARY ARTERY DISEASE INVOLVING NATIVE CORONARY ARTERY OF NATIVE HEART WITHOUT ANGINA PECTORIS: Primary | ICD-10-CM

## 2025-07-08 DIAGNOSIS — Z95.820 S/P ANGIOPLASTY WITH STENT: ICD-10-CM

## 2025-07-08 DIAGNOSIS — Z98.890 S/P CARDIAC CATH: ICD-10-CM

## 2025-07-08 PROBLEM — I10 ESSENTIAL HYPERTENSION: Status: RESOLVED | Noted: 2020-10-27 | Resolved: 2025-07-08

## 2025-07-08 PROCEDURE — 99214 OFFICE O/P EST MOD 30 MIN: CPT | Performed by: INTERNAL MEDICINE

## 2025-07-08 PROCEDURE — 1159F MED LIST DOCD IN RCRD: CPT | Performed by: INTERNAL MEDICINE

## 2025-07-08 PROCEDURE — 1160F RVW MEDS BY RX/DR IN RCRD: CPT | Performed by: INTERNAL MEDICINE

## 2025-07-08 PROCEDURE — 1123F ACP DISCUSS/DSCN MKR DOCD: CPT | Performed by: INTERNAL MEDICINE

## 2025-07-08 RX ORDER — SPIRONOLACTONE AND HYDROCHLOROTHIAZIDE 25; 25 MG/1; MG/1
0.5 TABLET ORAL
Qty: 18 TABLET | Refills: 3
Start: 2025-07-09

## 2025-07-08 NOTE — PROGRESS NOTES
Chief Complaint   Patient presents with    6 Month Follow-Up     6 month follow up.   Former pat of Dr. Gregory  Hx of CAD s/p stent 8yrs back      6 month follow up.    Last EKG done on 05/12/2025.    Lost 29 lb in 6 months    Denied Chest pain , palpitations or  Dizziness or edema    Sob on exertion - chronic-unchanged    No Hx of syncope    Still active and working    Leg edema better after aldactazide  No leg edema today  No wt gain    Patient Seen, Chart, Consults notes, Labs, Radiology studies reviewed.      Patient Active Problem List   Diagnosis    CAD (coronary artery disease)    Diverticulosis of colon    Hyperlipidemia    Obesity    GERD (gastroesophageal reflux disease)    Colon polyps    Sleep apnea    Chronic back pain    S/P cardiac cath 2014 at [facility]- patent LAD stent done 2006 and 40 to 50% lesions in other coronaries    S/P angioplasty with stent of the LAD 2014    Sinus bradycardia    Dilated cardiomyopathy (HCC)    Moderate aortic stenosis    Coagulopathy    Chronic combined systolic and diastolic congestive heart failure (HCC)    Kidney stone on left side    Gross hematuria    Acute urinary retention    Urinary tract infection with hematuria       Past Surgical History:   Procedure Laterality Date    BACK SURGERY  05/26/2015    Dr Mayen @ [facility])     CARDIAC CATHETERIZATION  2014       50%  lesion    joyce    CARDIAC CATHETERIZATION  04/2019      dr lam  and  diffuse  disease  30 to 50%    COLONOSCOPY  2012 2020        polyps   due   2025    CORONARY ANGIOPLASTY WITH STENT PLACEMENT  2006    LAD    CYSTOSCOPY W/ LASER LITHOTRIPSY Left 08/2024    with stent  per  dr gardiner    ESOPHAGOGASTRODUODENOSCOPY  11/2024    dr nicholson    EYE SURGERY  2011    bilateral    HERNIA REPAIR  11/2006    umbilical    JOINT REPLACEMENT Right 04/2019     [facility] [place]  right  hip    URETER SURGERY N/A 08/14/2024    Cystoscopy Left Ureteroscopy Laser Lithotripsy Basket Retrieval of

## 2025-07-21 ENCOUNTER — LAB (OUTPATIENT)
Dept: LAB | Age: 80
End: 2025-07-21

## 2025-07-21 DIAGNOSIS — E78.00 PURE HYPERCHOLESTEROLEMIA: Chronic | ICD-10-CM

## 2025-07-21 DIAGNOSIS — Z98.890 S/P CARDIAC CATH: ICD-10-CM

## 2025-07-21 DIAGNOSIS — I35.0 MODERATE AORTIC STENOSIS: ICD-10-CM

## 2025-07-21 DIAGNOSIS — I10 ESSENTIAL HYPERTENSION: ICD-10-CM

## 2025-07-21 DIAGNOSIS — I50.42 CHRONIC COMBINED SYSTOLIC AND DIASTOLIC CONGESTIVE HEART FAILURE (HCC): ICD-10-CM

## 2025-07-21 DIAGNOSIS — I42.0 DILATED CARDIOMYOPATHY (HCC): ICD-10-CM

## 2025-07-21 DIAGNOSIS — R00.1 SINUS BRADYCARDIA: ICD-10-CM

## 2025-07-21 DIAGNOSIS — Z95.820 S/P ANGIOPLASTY WITH STENT: ICD-10-CM

## 2025-07-21 DIAGNOSIS — I25.10 CORONARY ARTERY DISEASE INVOLVING NATIVE CORONARY ARTERY OF NATIVE HEART WITHOUT ANGINA PECTORIS: ICD-10-CM

## 2025-07-21 LAB
ALBUMIN SERPL BCG-MCNC: 3.9 G/DL (ref 3.4–4.9)
ALP SERPL-CCNC: 90 U/L (ref 40–129)
ALT SERPL W/O P-5'-P-CCNC: 26 U/L (ref 10–50)
ANION GAP SERPL CALC-SCNC: 12 MEQ/L (ref 8–16)
AST SERPL-CCNC: 32 U/L (ref 10–50)
BILIRUB CONJ SERPL-MCNC: 0.6 MG/DL (ref 0–0.2)
BILIRUB SERPL-MCNC: 1.2 MG/DL (ref 0.3–1.2)
BUN SERPL-MCNC: 13 MG/DL (ref 8–23)
CALCIUM SERPL-MCNC: 9.9 MG/DL (ref 8.8–10.2)
CHLORIDE SERPL-SCNC: 105 MEQ/L (ref 98–111)
CHOLEST SERPL-MCNC: 142 MG/DL (ref 100–199)
CO2 SERPL-SCNC: 24 MEQ/L (ref 22–29)
CREAT SERPL-MCNC: 0.8 MG/DL (ref 0.7–1.2)
GFR SERPL CREATININE-BSD FRML MDRD: 89 ML/MIN/1.73M2
GLUCOSE SERPL-MCNC: 95 MG/DL (ref 74–109)
HDLC SERPL-MCNC: 50 MG/DL
LDLC SERPL CALC-MCNC: 67 MG/DL
MAGNESIUM SERPL-MCNC: 1.9 MG/DL (ref 1.6–2.6)
POTASSIUM SERPL-SCNC: 3.8 MEQ/L (ref 3.5–5.2)
PROT SERPL-MCNC: 6.5 G/DL (ref 6.4–8.3)
SODIUM SERPL-SCNC: 141 MEQ/L (ref 135–145)
TRIGL SERPL-MCNC: 127 MG/DL (ref 0–199)

## 2025-07-22 ENCOUNTER — OFFICE VISIT (OUTPATIENT)
Dept: FAMILY MEDICINE CLINIC | Age: 80
End: 2025-07-22

## 2025-07-22 VITALS
DIASTOLIC BLOOD PRESSURE: 72 MMHG | BODY MASS INDEX: 29.77 KG/M2 | RESPIRATION RATE: 16 BRPM | SYSTOLIC BLOOD PRESSURE: 118 MMHG | HEART RATE: 64 BPM | WEIGHT: 201 LBS | HEIGHT: 69 IN

## 2025-07-22 DIAGNOSIS — L30.9 ECZEMA, UNSPECIFIED TYPE: ICD-10-CM

## 2025-07-22 DIAGNOSIS — K21.9 GASTROESOPHAGEAL REFLUX DISEASE WITHOUT ESOPHAGITIS: ICD-10-CM

## 2025-07-22 DIAGNOSIS — G89.29 CHRONIC MIDLINE LOW BACK PAIN WITHOUT SCIATICA: Primary | ICD-10-CM

## 2025-07-22 DIAGNOSIS — I25.10 CORONARY ARTERY DISEASE INVOLVING NATIVE CORONARY ARTERY OF NATIVE HEART WITHOUT ANGINA PECTORIS: ICD-10-CM

## 2025-07-22 DIAGNOSIS — G47.33 OBSTRUCTIVE SLEEP APNEA SYNDROME: ICD-10-CM

## 2025-07-22 DIAGNOSIS — I50.42 CHRONIC COMBINED SYSTOLIC AND DIASTOLIC CONGESTIVE HEART FAILURE (HCC): ICD-10-CM

## 2025-07-22 DIAGNOSIS — M54.50 CHRONIC MIDLINE LOW BACK PAIN WITHOUT SCIATICA: Primary | ICD-10-CM

## 2025-07-22 DIAGNOSIS — I35.0 MODERATE AORTIC STENOSIS: ICD-10-CM

## 2025-07-22 DIAGNOSIS — E78.00 PURE HYPERCHOLESTEROLEMIA: Chronic | ICD-10-CM

## 2025-07-22 DIAGNOSIS — L30.9 DERMATITIS: ICD-10-CM

## 2025-07-22 DIAGNOSIS — Z95.820 S/P ANGIOPLASTY WITH STENT: ICD-10-CM

## 2025-07-22 DIAGNOSIS — D68.9 COAGULOPATHY: ICD-10-CM

## 2025-07-22 PROCEDURE — 1123F ACP DISCUSS/DSCN MKR DOCD: CPT | Performed by: FAMILY MEDICINE

## 2025-07-22 PROCEDURE — 99213 OFFICE O/P EST LOW 20 MIN: CPT | Performed by: FAMILY MEDICINE

## 2025-07-22 RX ORDER — CLOTRIMAZOLE AND BETAMETHASONE DIPROPIONATE 10; .64 MG/G; MG/G
CREAM TOPICAL
Qty: 45 G | Refills: 1 | Status: SHIPPED | OUTPATIENT
Start: 2025-07-22 | End: 2025-07-22 | Stop reason: SDUPTHER

## 2025-07-22 RX ORDER — CLOTRIMAZOLE AND BETAMETHASONE DIPROPIONATE 10; .64 MG/G; MG/G
CREAM TOPICAL
Qty: 45 G | Refills: 3 | Status: SHIPPED | OUTPATIENT
Start: 2025-07-22

## 2025-07-22 ASSESSMENT — ENCOUNTER SYMPTOMS
ABDOMINAL PAIN: 0
CHEST TIGHTNESS: 0
SHORTNESS OF BREATH: 0
CONSTIPATION: 0
COUGH: 0
HEARTBURN: 0
ORTHOPNEA: 0
BLOOD IN STOOL: 0
BACK PAIN: 0
TROUBLE SWALLOWING: 0
EYE PAIN: 0
NAUSEA: 0
SORE THROAT: 0

## 2025-07-22 NOTE — PROGRESS NOTES
Subjective   Patient ID: Epi Franco is a 80 y.o. male.      Lumbar  disc  pain   noted  and  mif=d  back pain  noted and  with   complaint s     Ashd   stable     Coaguloapthy   noted  a nd   bleeding   with  teeth       Chf   combined                 Hypertension  This is a chronic problem. The current episode started more than 1 year ago. The problem has been resolved since onset. The problem is controlled. Pertinent negatives include no chest pain, headaches, orthopnea, palpitations or shortness of breath. The current treatment provides significant improvement. There are no compliance problems.    Gastroesophageal Reflux  He reports no abdominal pain, no chest pain, no coughing, no heartburn, no nausea or no sore throat. This is a chronic problem. The symptoms are aggravated by certain foods. Pertinent negatives include no fatigue or melena. He has tried a PPI for the symptoms. The treatment provided significant relief.     Past Medical History:   Diagnosis Date    CAD (coronary artery disease) 01/2006    cath with stent lad    Chronic back pain 2013      epidural block    Colon polyps 10/2005    Diverticulosis of colon     DVT of proximal leg (deep vein thrombosis) ([facility]) 12/04 2013    left     right  in 2013    DVT of proximal leg (deep vein thrombosis) ([facility])     left     GERD (gastroesophageal reflux disease) 11/2024    barrets    Kidney stone on left side 08/2024    left  side  in  2009  and 2020    Pulmonary embolus ([facility]) 6-2013 and  1-2015     had   right  leg  dvt    S/P cardiac cath 2014 at [facility]- patent LAD stent done 2006 and 40 to 50% lesions in other coronaries 04/12/2019    S/P cardiac cath 4/17/19- large all ectatic vessel, lm-p, lad prox 40%, mid 50%, distal 40 distal stent -patent, lcx prox 40%, rca prox 30%, dist 40%, edp 11, ef 50%- med  RX 04/17/2019    SOB (shortness of breath) on exertion 06/08/2020    Unspecified sleep apnea 2013    cpap     Past Surgical History:   Procedure

## 2025-07-29 DIAGNOSIS — E78.00 PURE HYPERCHOLESTEROLEMIA: Chronic | ICD-10-CM

## 2025-07-29 RX ORDER — ATORVASTATIN CALCIUM 80 MG/1
80 TABLET, FILM COATED ORAL DAILY
Qty: 90 TABLET | Refills: 0 | Status: SHIPPED | OUTPATIENT
Start: 2025-07-29

## 2025-07-29 NOTE — TELEPHONE ENCOUNTER
Date of last visit:  7/22/2025  Date of next visit:  10/29/2025    Requested Prescriptions     Pending Prescriptions Disp Refills    atorvastatin (LIPITOR) 80 MG tablet [Pharmacy Med Name: Atorvastatin Calcium Oral Tablet 80 MG] 90 tablet 0     Sig: Take 1 tablet by mouth daily

## 2025-07-31 DIAGNOSIS — D68.9 COAGULOPATHY: ICD-10-CM

## 2025-08-01 RX ORDER — FOLIC ACID 1 MG/1
1000 TABLET ORAL DAILY
Qty: 90 TABLET | Refills: 2 | Status: SHIPPED | OUTPATIENT
Start: 2025-08-01

## 2025-08-01 NOTE — TELEPHONE ENCOUNTER
Date of last visit:  7/22/2025  Date of next visit:  10/29/2025    Requested Prescriptions     Pending Prescriptions Disp Refills    folic acid (FOLVITE) 1 MG tablet [Pharmacy Med Name: Folic Acid Oral Tablet 1 MG] 90 tablet 0     Sig: TAKE 1 TABLET BY MOUTH EVERY DAY

## 2025-08-05 ENCOUNTER — HOSPITAL ENCOUNTER (OUTPATIENT)
Age: 80
Discharge: HOME | End: 2025-08-07
Attending: INTERNAL MEDICINE
Payer: MEDICARE

## 2025-08-05 VITALS
HEIGHT: 69 IN | SYSTOLIC BLOOD PRESSURE: 118 MMHG | BODY MASS INDEX: 29.77 KG/M2 | WEIGHT: 201 LBS | DIASTOLIC BLOOD PRESSURE: 72 MMHG

## 2025-08-05 DIAGNOSIS — I35.0 MODERATE AORTIC STENOSIS: ICD-10-CM

## 2025-08-05 LAB
ECHO AO ASC DIAM: 4.1 CM
ECHO AO ASCENDING AORTA INDEX: 1.98 CM/M2
ECHO AO SINUS VALSALVA DIAM: 3.8 CM
ECHO AO SINUS VALSALVA INDEX: 1.84 CM/M2
ECHO AO ST JNCT DIAM: 3.3 CM
ECHO AR MAX VEL PISA: 3.6 M/S
ECHO AV ACCELERATION TIME: 63 MS
ECHO AV AREA PEAK VELOCITY: 1.4 CM2
ECHO AV AREA VTI: 1.3 CM2
ECHO AV AREA/BSA PEAK VELOCITY: 0.7 CM2/M2
ECHO AV AREA/BSA VTI: 0.6 CM2/M2
ECHO AV CUSP MM: 1.3 CM
ECHO AV MEAN GRADIENT: 11 MMHG
ECHO AV MEAN VELOCITY: 1.5 M/S
ECHO AV PEAK GRADIENT: 21 MMHG
ECHO AV PEAK VELOCITY: 2.3 M/S
ECHO AV REGURGITANT PHT: 738 MS
ECHO AV VELOCITY RATIO: 0.39
ECHO AV VTI: 48.3 CM
ECHO BSA: 2.11 M2
ECHO EST RA PRESSURE: 3 MMHG
ECHO LA AREA 2C: 15.6 CM2
ECHO LA AREA 4C: 15.2 CM2
ECHO LA DIAMETER INDEX: 1.59 CM/M2
ECHO LA DIAMETER: 3.3 CM
ECHO LA MAJOR AXIS: 4.5 CM
ECHO LA MINOR AXIS: 5.1 CM
ECHO LA VOL BP: 43 ML (ref 18–58)
ECHO LA VOL MOD A2C: 38 ML (ref 18–58)
ECHO LA VOL MOD A4C: 43 ML (ref 18–58)
ECHO LA VOL/BSA BIPLANE: 21 ML/M2 (ref 16–34)
ECHO LA VOLUME INDEX MOD A2C: 18 ML/M2 (ref 16–34)
ECHO LA VOLUME INDEX MOD A4C: 21 ML/M2 (ref 16–34)
ECHO LV E' LATERAL VELOCITY: 8.3 CM/S
ECHO LV E' SEPTAL VELOCITY: 5.5 CM/S
ECHO LV EF PHYSICIAN: 57 %
ECHO LV FRACTIONAL SHORTENING: 30 % (ref 28–44)
ECHO LV INTERNAL DIMENSION DIASTOLE INDEX: 2.42 CM/M2
ECHO LV INTERNAL DIMENSION DIASTOLIC: 5 CM (ref 4.2–5.9)
ECHO LV INTERNAL DIMENSION SYSTOLIC INDEX: 1.69 CM/M2
ECHO LV INTERNAL DIMENSION SYSTOLIC: 3.5 CM
ECHO LV ISOVOLUMETRIC RELAXATION TIME (IVRT): 63 MS
ECHO LV IVSD: 1.2 CM (ref 0.6–1)
ECHO LV MASS 2D: 207.1 G (ref 88–224)
ECHO LV MASS INDEX 2D: 100.1 G/M2 (ref 49–115)
ECHO LV POSTERIOR WALL DIASTOLIC: 1 CM (ref 0.6–1)
ECHO LV RELATIVE WALL THICKNESS RATIO: 0.4
ECHO LVOT AREA: 3.5 CM2
ECHO LVOT AV VTI INDEX: 0.43
ECHO LVOT DIAM: 2.1 CM
ECHO LVOT MEAN GRADIENT: 2 MMHG
ECHO LVOT PEAK GRADIENT: 3 MMHG
ECHO LVOT PEAK VELOCITY: 0.9 M/S
ECHO LVOT STROKE VOLUME INDEX: 34.8 ML/M2
ECHO LVOT SV: 72 ML
ECHO LVOT VTI: 20.8 CM
ECHO MV A VELOCITY: 0.93 M/S
ECHO MV E DECELERATION TIME (DT): 278 MS
ECHO MV E VELOCITY: 0.89 M/S
ECHO MV E/A RATIO: 0.96
ECHO MV E/E' LATERAL: 10.72
ECHO MV E/E' RATIO (AVERAGED): 13.45
ECHO MV E/E' SEPTAL: 16.18
ECHO MV REGURGITANT PEAK GRADIENT: 67 MMHG
ECHO MV REGURGITANT PEAK VELOCITY: 4.1 M/S
ECHO PULMONARY ARTERY END DIASTOLIC PRESSURE: 3 MMHG
ECHO PV MAX VELOCITY: 0.6 M/S
ECHO PV PEAK GRADIENT: 2 MMHG
ECHO PV REGURGITANT MAX VELOCITY: 0.9 M/S
ECHO RV INTERNAL DIMENSION: 3.3 CM
ECHO RV TAPSE: 1.6 CM (ref 1.7–?)
ECHO TV E WAVE: 0.6 M/S

## 2025-08-05 PROCEDURE — 93306 TTE W/DOPPLER COMPLETE: CPT

## 2025-08-05 PROCEDURE — 93306 TTE W/DOPPLER COMPLETE: CPT | Performed by: INTERNAL MEDICINE

## (undated) DEVICE — SOLUTION IRRIG 3000ML 0.9% SOD CHL USP UROMATIC PLAS CONT

## (undated) DEVICE — STRIP,CLOSURE,WOUND,MEDI-STRIP,1/2X4: Brand: MEDLINE

## (undated) DEVICE — FIBER LASER HOLM DISP SU200RT] LEONI FIBER OPTICS INC]

## (undated) DEVICE — CYSTO: Brand: MEDLINE INDUSTRIES, INC.

## (undated) DEVICE — NITINOL STONE RETRIEVAL BASKET: Brand: ZERO TIP

## (undated) DEVICE — DUAL LUMEN URETERAL CATHETER

## (undated) DEVICE — ADAPTER URO SCP UROLOK LL

## (undated) DEVICE — SINGLE ACTION PUMPING SYSTEM

## (undated) DEVICE — GUIDEWIRE URO L150CM DIA .035IN STIFF NIT HYDRPHL STR TIP